# Patient Record
Sex: FEMALE | Race: BLACK OR AFRICAN AMERICAN | Employment: FULL TIME | URBAN - METROPOLITAN AREA
[De-identification: names, ages, dates, MRNs, and addresses within clinical notes are randomized per-mention and may not be internally consistent; named-entity substitution may affect disease eponyms.]

---

## 2020-12-10 PROBLEM — I10 HYPERTENSION: Status: ACTIVE | Noted: 2020-12-10

## 2020-12-10 PROBLEM — E87.6 HYPOKALEMIA: Status: ACTIVE | Noted: 2020-12-10

## 2021-06-08 ENCOUNTER — OFFICE VISIT (OUTPATIENT)
Dept: ENT CLINIC | Age: 61
End: 2021-06-08
Payer: OTHER GOVERNMENT

## 2021-06-08 VITALS
HEART RATE: 76 BPM | SYSTOLIC BLOOD PRESSURE: 132 MMHG | TEMPERATURE: 97.5 F | DIASTOLIC BLOOD PRESSURE: 70 MMHG | BODY MASS INDEX: 33.45 KG/M2 | WEIGHT: 200.8 LBS | HEIGHT: 65 IN | RESPIRATION RATE: 16 BRPM | OXYGEN SATURATION: 96 %

## 2021-06-08 DIAGNOSIS — H93.8X3 PLUGGED FEELING IN EAR, BILATERAL: Primary | ICD-10-CM

## 2021-06-08 DIAGNOSIS — H91.91 DECREASED HEARING, RIGHT: ICD-10-CM

## 2021-06-08 PROCEDURE — 99203 OFFICE O/P NEW LOW 30 MIN: CPT | Performed by: OTOLARYNGOLOGY

## 2021-06-08 RX ORDER — LISINOPRIL 10 MG/1
TABLET ORAL DAILY
COMMUNITY
End: 2021-08-13 | Stop reason: SDUPTHER

## 2021-06-08 NOTE — LETTER
6/8/2021 Patient: Ludmila Dillard YOB: 1960 Date of Visit: 6/8/2021 Javier Arias MD 
[facility]. [address] [place] [address],[id number] [address] [facility] [address] Dear Javier Arias MD, Thank you for referring Ms. Ludmila Dillard to [facility] EAR NOSE AND THROAT [address], THROAT AND ALLERGY CARE for evaluation. My notes for this consultation are attached. If you have questions, please do not hesitate to call me. I look forward to following your patient along with you. Sincerely, Talib Smith MD

## 2021-06-08 NOTE — PROGRESS NOTES
Otolaryngology-Head and Neck Surgery  New Patient Visit     Patient: Kennedy Jackson  YOB: 1960  MRN: [id number]  Date of Service: 6/8/2021    Chief Complaint:  Bilateral ear plugging     History of Present Illness: Kennedy Jackson is a 62y.o. year old female who presents today for discussion of ear plugging, bilateral. She notes about 1 month ago developing sensation of bilateral ear plugging. Her hearing seems to be impacted, feels like she can't hear out of her right ear. Tried using sweet oil without much help    No URI, trauma  No manipulation of ears    No otologic hx     Past Medical History:  Past Medical History:   Diagnosis Date    Hypertension 12/10/2020    Hypokalemia 12/10/2020       Past Surgical History:   Past Surgical History:   Procedure Laterality Date    HX HYSTERECTOMY      partial       Medications:   Current Outpatient Medications   Medication Instructions    amlodipine besylate (AMLODIPINE PO) Oral    lisinopriL (PRINIVIL, ZESTRIL) 10 mg tablet Oral, DAILY       Allergies:   No Known Allergies    Social History:   Social History     Tobacco Use    Smoking status: Current Every Day Smoker    Smokeless tobacco: Never Used   Vaping Use    Vaping Use: Never used   Substance Use Topics    Alcohol use: Not Currently    Drug use: Never   1 PPD        Family History:  Family History   Problem Relation Age of Onset    Hypertension Mother        Review of Systems:    Consitutional: denies fever, excessive weight gain or loss. Eyes: denies diplopia, eye pain. Integumentary: denies new concerning skin lesions. Ears, Nose, Mouth, Throat: denies except as per HPI.   Endocrine: denies hot or cold intolerance, increased thirst.  Respiratory: denies cough, hemoptysis, wheezing  Gastrointestinal: denies trouble swallowing, nausea, emesis, regurgitation  Musculoskeletal: denies muscle weakness or wasting  Cardiovascular: denies chest pain, shortness of breath  Neurologic: denies seizures, numbness or tingling, syncope  Hematologic: denies easy bleeding or bruising    Physical Examination:   There were no vitals filed for this visit. General: Comfortable, pleasant, appears stated age  Voice: Strong, speaking in full sentences, no stridor    Face: No masses or lesions, facial strength symmetric   Ears: External ears unremarkable. Bilateral ear canal clear. Tympanic membrane clear and intact, with visible landmarks. Clear middle ear space. Mild hyperemia of TM bilaterally   Nose: External nose unremarkable. Dorsum midline. Anterior rhinoscopy demonstrates no lesions. Septum midline. Turbinates without hypertrophy. Oral Cavity / Oropharynx: No trismus. Mucosa pink and moist. No lesions. Tongue is midline and mobile. Palate elevates symmetrically. Uvula midline. Tonsils unremarkable. Base of tongue soft. Floor of mouth soft. Neck: Supple. No adenopathy. Thyroid unremarkable. Palpable laryngeal landmarks. Full neck range of motion   Neurologic: CN II - XI intact. Normal gait. [place] to the left , AC > BC       Assessment and Plan:   1. Decreased hearing, right  2. Bilateral ear plugging  - Discussed normal ear exam today  - No effusion, infection, cerumen to account for symptoms  - Tuning fork and history raise question of sudden R SNHL  - She will come to our [facility] office tomorrow afternoon for audiogram to rule out sudden SNHL      Addendum - audiogram results 6/9 reviewed - borderline/mild HFSNHL bilaterally, symmetric    No sign of sudden SNHL    Will Rx flonase, allegra in case of ETD    Follow up 1 mo    The patient was instructed to return to clinic if no improvement or progression of symptoms. Signs to watch out for reviewed.       MD Luana Espinoza [facility]  [address] [place] [address]  Office Phone: [phone]

## 2021-06-08 NOTE — PROGRESS NOTES
Chief Complaint   Patient presents with    New Patient     Clogged Ears     Visit Vitals  /70 (BP 1 Location: Left upper arm, BP Patient Position: Sitting, BP Cuff Size: Adult)   Pulse 76   Temp 97.5 °F (36.4 °C)   Resp 16   Ht 5' 5\" (1.651 m)   Wt 200 lb 12.8 oz (91.1 kg)   SpO2 96%   BMI 33.41 kg/m²

## 2021-06-09 ENCOUNTER — OFFICE VISIT (OUTPATIENT)
Dept: ENT CLINIC | Age: 61
End: 2021-06-09
Payer: OTHER GOVERNMENT

## 2021-06-09 DIAGNOSIS — H90.42 SENSORINEURAL HEARING LOSS (SNHL) OF LEFT EAR WITH UNRESTRICTED HEARING OF RIGHT EAR: ICD-10-CM

## 2021-06-09 PROCEDURE — 92557 COMPREHENSIVE HEARING TEST: CPT | Performed by: AUDIOLOGIST

## 2021-06-09 RX ORDER — LORATADINE 10 MG/1
10 TABLET ORAL DAILY
Qty: 30 TABLET | Refills: 1 | Status: SHIPPED | OUTPATIENT
Start: 2021-06-09 | End: 2021-09-20 | Stop reason: ALTCHOICE

## 2021-06-09 RX ORDER — FLUTICASONE PROPIONATE 50 MCG
2 SPRAY, SUSPENSION (ML) NASAL DAILY
Qty: 1 BOTTLE | Refills: 1 | Status: SHIPPED | OUTPATIENT
Start: 2021-06-09 | End: 2021-09-20 | Stop reason: ALTCHOICE

## 2021-06-09 NOTE — PROGRESS NOTES
Humphrey Moreau, a 62y.o. year old female, was seen in ENT clinic today for a hearing evaluation on referral from Dr. Kamala Pinon. Patient complains of both ears being clogged for approximately 1 month. Patient was initially seen the day before on 6-8-2021 at the [facility]; exam suggested possible right sudden sensorineural hearing loss ([place] lateralized left per ENT report). Patient denies any tinnitus, ear pain, dizziness or unsteadiness. She does note that she sometimes has difficulty hearing and understanding but is unsure if this began prior to the clogged feeling. Otoscopy: normal external ear canals and visible tympanic membranes, bilaterally. Tympanometry: CNT due to broken equipment, awaiting repair. SRT: RE Speech Reception Threshold (SRT) was obtained at 20 dBHL LE Speech Reception Threshold (SRT) was obtained at 20 dBHL    WRS: RE Excellent in quiet when words were presented at 60 dBHL. WRS: LE Excellent in quiet when words were presented at 60 dBHL. Pure tone audiometry:  RE: WNL sloping to borderline normal hearing  LE: WNL sloping to mild high-frequency likely sensorineural hearing loss    Mild sensorineural hearing loss in the left ear only    Impressions:  hearing loss requiring medical/otologic and audiologic follow-up   Discussed results of today's testing with patient. Patient's hearing is normal to borderline normal in the right ear, with mild high-frequency hearing loss in the left ear. Discussed that high frequency loss can lead to muffled-sounding speech, which may coincide with the feeling of clogged ears. Recommended follow-up with ENT, with repeat hearing test in 1 year to monitor hearing loss. Did discuss that although patient is not a traditional candidate for amplification, it may be worth discussion if she feels that she is having significant trouble understanding speech. Plan:  Follow-up with ENT.   Repeat audiogram upon request.    Korey Valentine, AuD Doctor of Audiology

## 2021-08-13 NOTE — TELEPHONE ENCOUNTER
Requested Prescriptions     Pending Prescriptions Disp Refills    lisinopriL (PRINIVIL, ZESTRIL) 10 mg tablet       Sig: Take  by mouth daily.    patient is also requesting amlodipine and hydrochlorot 25 mg

## 2021-08-15 RX ORDER — LISINOPRIL 10 MG/1
10 TABLET ORAL DAILY
Qty: 90 TABLET | Refills: 1 | Status: SHIPPED | OUTPATIENT
Start: 2021-08-15 | End: 2021-09-20 | Stop reason: ALTCHOICE

## 2021-09-20 ENCOUNTER — OFFICE VISIT (OUTPATIENT)
Dept: PRIMARY CARE CLINIC | Age: 61
End: 2021-09-20
Payer: OTHER GOVERNMENT

## 2021-09-20 VITALS
BODY MASS INDEX: 33.53 KG/M2 | HEART RATE: 79 BPM | SYSTOLIC BLOOD PRESSURE: 146 MMHG | RESPIRATION RATE: 20 BRPM | OXYGEN SATURATION: 94 % | DIASTOLIC BLOOD PRESSURE: 111 MMHG | WEIGHT: 201.25 LBS | HEIGHT: 65 IN | TEMPERATURE: 98.8 F

## 2021-09-20 DIAGNOSIS — Z12.11 SCREENING FOR MALIGNANT NEOPLASM OF COLON: ICD-10-CM

## 2021-09-20 DIAGNOSIS — Z12.31 SCREENING MAMMOGRAM, ENCOUNTER FOR: ICD-10-CM

## 2021-09-20 DIAGNOSIS — I10 ESSENTIAL HYPERTENSION: Primary | ICD-10-CM

## 2021-09-20 DIAGNOSIS — E78.2 MIXED HYPERLIPIDEMIA: ICD-10-CM

## 2021-09-20 DIAGNOSIS — E66.09 CLASS 1 OBESITY DUE TO EXCESS CALORIES WITH SERIOUS COMORBIDITY AND BODY MASS INDEX (BMI) OF 33.0 TO 33.9 IN ADULT: ICD-10-CM

## 2021-09-20 DIAGNOSIS — F17.200 TOBACCO DEPENDENCE: ICD-10-CM

## 2021-09-20 PROCEDURE — 99214 OFFICE O/P EST MOD 30 MIN: CPT | Performed by: NURSE PRACTITIONER

## 2021-09-20 RX ORDER — LISINOPRIL AND HYDROCHLOROTHIAZIDE 10; 12.5 MG/1; MG/1
1 TABLET ORAL DAILY
Qty: 90 TABLET | Refills: 0 | Status: SHIPPED | OUTPATIENT
Start: 2021-09-20 | End: 2022-01-27 | Stop reason: ALTCHOICE

## 2021-09-20 RX ORDER — LISINOPRIL AND HYDROCHLOROTHIAZIDE 10; 12.5 MG/1; MG/1
1 TABLET ORAL DAILY
Qty: 30 TABLET | Refills: 0 | Status: SHIPPED | OUTPATIENT
Start: 2021-09-20 | End: 2021-09-20 | Stop reason: SDUPTHER

## 2021-09-20 RX ORDER — LISINOPRIL AND HYDROCHLOROTHIAZIDE 10; 12.5 MG/1; MG/1
1 TABLET ORAL DAILY
Qty: 90 TABLET | Refills: 0 | Status: SHIPPED | OUTPATIENT
Start: 2021-09-20 | End: 2021-09-20 | Stop reason: SDUPTHER

## 2021-09-20 NOTE — PROGRESS NOTES
Chief Complaint   Patient presents with    Hypertension     States has been out of medication for approximately one month.  Medication Refill     Patient states she is supposed to be on Amlodipine, Lisinopril and HCTZ. She gets #30 days at [facility] and #90 days from Mail order. 1. Have you been to the ER, urgent care clinic since your last visit? Hospitalized since your last visit? No    2. Have you seen or consulted any other health care providers outside of the [address] [place] [address] since your last visit? Include any pap smears or colon screening.  No

## 2021-09-21 ENCOUNTER — TELEPHONE (OUTPATIENT)
Dept: PRIMARY CARE CLINIC | Age: 61
End: 2021-09-21

## 2021-09-21 NOTE — PROGRESS NOTES
Junior Zaman is a 62 y.o. female who presents to the office today for the following:    Chief Complaint   Patient presents with    Hypertension     States has been out of medication for approximately one month.  Medication Refill     Patient states she is supposed to be on Amlodipine, Lisinopril and HCTZ. She gets #30 days at [facility] and #90 days from Mail order. Past Medical History:   Diagnosis Date    Hypertension 12/10/2020    Hypokalemia 12/10/2020       Past Surgical History:   Procedure Laterality Date    HX HYSTERECTOMY      partial        Family History   Problem Relation Age of Onset    Hypertension Mother         Social History     Tobacco Use    Smoking status: Current Every Day Smoker     Packs/day: 1.00    Smokeless tobacco: Never Used   Vaping Use    Vaping Use: Never used   Substance Use Topics    Alcohol use: Not Currently    Drug use: Never        HPI  Patient here today for follow up with PMH of hypertension, tobacco dependence and hypokalemia. States that she needs medication refilled for blood pressure. Also would like to do labs. No specific concerns. No current outpatient medications on file prior to visit. No current facility-administered medications on file prior to visit. Medications Ordered Today   Medications    DISCONTD: lisinopril-hydroCHLOROthiazide (PRINZIDE, ZESTORETIC) 10-12.5 mg per tablet     Sig: Take 1 Tablet by mouth daily. Dispense:  90 Tablet     Refill:  0    DISCONTD: lisinopril-hydroCHLOROthiazide (PRINZIDE, ZESTORETIC) 10-12.5 mg per tablet     Sig: Take 1 Tablet by mouth daily. Dispense:  30 Tablet     Refill:  0     Changed to 30 day and 90 day sent to mail order    lisinopril-hydroCHLOROthiazide (PRINZIDE, ZESTORETIC) 10-12.5 mg per tablet     Sig: Take 1 Tablet by mouth daily.      Dispense:  90 Tablet     Refill:  0     Changed to 30 day and 90 day sent to mail order        Review of Systems   Constitutional: Negative. HENT: Negative. Eyes: Negative. Respiratory: Negative. Cardiovascular: Negative. Gastrointestinal: Negative. Genitourinary: Negative. Musculoskeletal: Negative. Skin: Negative. Neurological: Negative. Psychiatric/Behavioral: Negative. Visit Vitals  BP (!) 146/111 (BP 1 Location: Right upper arm, BP Patient Position: Sitting, BP Cuff Size: Adult)   Pulse 79   Temp 98.8 °F (37.1 °C) (Oral)   Resp 20   Ht 5' 5\" (1.651 m)   Wt 201 lb 4 oz (91.3 kg)   SpO2 94%   BMI 33.49 kg/m²       Physical Exam  Vitals and nursing note reviewed. Constitutional:       Appearance: Normal appearance. She is obese. Eyes:      Pupils: Pupils are equal, round, and reactive to light. Neck:      Vascular: No carotid bruit. Cardiovascular:      Rate and Rhythm: Normal rate and regular rhythm. Pulses: Normal pulses. Heart sounds: Normal heart sounds. Pulmonary:      Effort: Pulmonary effort is normal.      Breath sounds: Normal breath sounds. Abdominal:      General: Bowel sounds are normal.      Palpations: Abdomen is soft. Tenderness: There is no abdominal tenderness. There is no guarding or rebound. Hernia: No hernia is present. Musculoskeletal:         General: Normal range of motion. Right lower leg: No edema. Left lower leg: No edema. Lymphadenopathy:      Cervical: No cervical adenopathy. Skin:     General: Skin is warm and dry. Neurological:      Mental Status: She is alert and oriented to person, place, and time. Mental status is at baseline.    Psychiatric:         Mood and Affect: Mood normal.         Behavior: Behavior normal.            1. Essential hypertension  Blood pressure is not controlled but needs refill on medication  Checking fasting labs today  Encourage to monitor at home and notify provider if > 140/90 consistently  - CBC WITH AUTOMATED DIFF  - METABOLIC PANEL, COMPREHENSIVE  - URINALYSIS W/ RFLX MICROSCOPIC  - LIPID PANEL  - TSH RFX ON ABNORMAL TO FREE T4  - lisinopril-hydroCHLOROthiazide (PRINZIDE, ZESTORETIC) 10-12.5 mg per tablet; Take 1 Tablet by mouth daily. Dispense: 90 Tablet; Refill: 0    2. Tobacco dependence  Continue to encourage smoking cessation    3. Screening mammogram, encounter for  Check mammogram  - [facility] MAMMO BI SCREENING INCL CAD; Future    4. Screening for malignant neoplasm of colon  Refer for screening colonscopy  - REFERRAL TO GASTROENTEROLOGY  - COLONOSCOPY; Future    5. Class 1 obesity due to excess calories with serious comorbidity and body mass index (BMI) of 33.0 to 33.9 in adult  Continue to encourage weight loss. Recommend decreasing excess fat, salt and sugar in diet along with getting regular exercise 3-5 times weekly for 30-45 minutes consistently. Patient verbalizes understanding of plan of care as discussed above    Follow-up and Dispositions    · Return in about 3 months (around 12/20/2021) for or sooner for worsening symptoms.

## 2021-09-23 LAB
ALBUMIN SERPL-MCNC: 4.3 G/DL (ref 3.8–4.9)
ALBUMIN/GLOB SERPL: 1.5 {RATIO} (ref 1.2–2.2)
ALP SERPL-CCNC: 149 IU/L (ref 44–121)
ALT SERPL-CCNC: 12 IU/L (ref 0–32)
APPEARANCE UR: CLEAR
AST SERPL-CCNC: 17 IU/L (ref 0–40)
BASOPHILS # BLD AUTO: 0.1 X10E3/UL (ref 0–0.2)
BASOPHILS NFR BLD AUTO: 1 %
BILIRUB SERPL-MCNC: 0.3 MG/DL (ref 0–1.2)
BILIRUB UR QL STRIP: NEGATIVE
BUN SERPL-MCNC: 12 MG/DL (ref 6–24)
BUN/CREAT SERPL: 12 (ref 9–23)
CALCIUM SERPL-MCNC: 9.8 MG/DL (ref 8.7–10.2)
CHLORIDE SERPL-SCNC: 106 MMOL/L (ref 96–106)
CHOLEST SERPL-MCNC: 210 MG/DL (ref 100–199)
CO2 SERPL-SCNC: 23 MMOL/L (ref 20–29)
COLOR UR: YELLOW
CREAT SERPL-MCNC: 0.98 MG/DL (ref 0.57–1)
EOSINOPHIL # BLD AUTO: 0.2 X10E3/UL (ref 0–0.4)
EOSINOPHIL NFR BLD AUTO: 4 %
ERYTHROCYTE [DISTWIDTH] IN BLOOD BY AUTOMATED COUNT: 13.8 % (ref 11.7–15.4)
GLOBULIN SER CALC-MCNC: 2.8 G/DL (ref 1.5–4.5)
GLUCOSE SERPL-MCNC: 94 MG/DL (ref 65–99)
GLUCOSE UR QL: NEGATIVE
HCT VFR BLD AUTO: 43.3 % (ref 34–46.6)
HDLC SERPL-MCNC: 64 MG/DL
HGB BLD-MCNC: 14.2 G/DL (ref 11.1–15.9)
HGB UR QL STRIP: NEGATIVE
IMM GRANULOCYTES # BLD AUTO: 0 X10E3/UL (ref 0–0.1)
IMM GRANULOCYTES NFR BLD AUTO: 0 %
KETONES UR QL STRIP: NEGATIVE
LDLC SERPL CALC-MCNC: 134 MG/DL (ref 0–99)
LEUKOCYTE ESTERASE UR QL STRIP: NEGATIVE
LYMPHOCYTES # BLD AUTO: 2.3 X10E3/UL (ref 0.7–3.1)
LYMPHOCYTES NFR BLD AUTO: 49 %
MCH RBC QN AUTO: 28.1 PG (ref 26.6–33)
MCHC RBC AUTO-ENTMCNC: 32.8 G/DL (ref 31.5–35.7)
MCV RBC AUTO: 86 FL (ref 79–97)
MICRO URNS: NORMAL
MONOCYTES # BLD AUTO: 0.4 X10E3/UL (ref 0.1–0.9)
MONOCYTES NFR BLD AUTO: 9 %
NEUTROPHILS # BLD AUTO: 1.8 X10E3/UL (ref 1.4–7)
NEUTROPHILS NFR BLD AUTO: 37 %
NITRITE UR QL STRIP: NEGATIVE
PH UR STRIP: 6.5 [PH] (ref 5–7.5)
PLATELET # BLD AUTO: 180 X10E3/UL (ref 150–450)
POTASSIUM SERPL-SCNC: 4.1 MMOL/L (ref 3.5–5.2)
PROT SERPL-MCNC: 7.1 G/DL (ref 6–8.5)
PROT UR QL STRIP: NEGATIVE
RBC # BLD AUTO: 5.06 X10E6/UL (ref 3.77–5.28)
SODIUM SERPL-SCNC: 142 MMOL/L (ref 134–144)
SP GR UR: 1.02 (ref 1–1.03)
TRIGL SERPL-MCNC: 68 MG/DL (ref 0–149)
TSH SERPL DL<=0.005 MIU/L-ACNC: 1.13 UIU/ML (ref 0.45–4.5)
UROBILINOGEN UR STRIP-MCNC: 0.2 MG/DL (ref 0.2–1)
VLDLC SERPL CALC-MCNC: 12 MG/DL (ref 5–40)
WBC # BLD AUTO: 4.8 X10E3/UL (ref 3.4–10.8)

## 2021-10-04 RX ORDER — ATORVASTATIN CALCIUM 20 MG/1
20 TABLET, FILM COATED ORAL DAILY
Qty: 90 TABLET | Refills: 0 | Status: SHIPPED | OUTPATIENT
Start: 2021-10-04 | End: 2022-01-27 | Stop reason: SDUPTHER

## 2021-10-04 NOTE — PROGRESS NOTES
Please let patient know that bad cholesterol is high at 134. The 10-year ASCVD risk score (Robin Quintero, et al., 2013) is: 17% I am recommending that she start on statin medication to help lower cardiac risk. I am going to send in and need to repeat the liver enzymes x 1 in 6 weeks. Most common side effect can be muscle aches (new pains so be aware of her baseline myalgias) and would want to know if these develop. Otherwise usually well tolerated and should take at night. Her alk phos is also slightly elevated but will just re-check when she has liver enzymes done at 6 weeks. Her rest of labs are essentially normal. If any questions, please let me know.

## 2021-10-07 ENCOUNTER — TELEPHONE (OUTPATIENT)
Dept: PRIMARY CARE CLINIC | Age: 61
End: 2021-10-07

## 2021-10-12 ENCOUNTER — TELEPHONE (OUTPATIENT)
Dept: PRIMARY CARE CLINIC | Age: 61
End: 2021-10-12

## 2021-10-12 NOTE — TELEPHONE ENCOUNTER
Patient called and said [facility] did not have her prescription that Susana Pradhan sent. It looks like it was sent on 10/04/21 but pharmacy is saying they do not have it. Patient would like a call back.

## 2021-10-19 ENCOUNTER — HOSPITAL ENCOUNTER (OUTPATIENT)
Dept: MAMMOGRAPHY | Age: 61
Discharge: HOME | End: 2021-10-19
Attending: NURSE PRACTITIONER
Payer: OTHER GOVERNMENT

## 2021-10-19 DIAGNOSIS — Z12.31 SCREENING MAMMOGRAM, ENCOUNTER FOR: ICD-10-CM

## 2021-10-19 PROCEDURE — 77063 BREAST TOMOSYNTHESIS BI: CPT

## 2021-11-17 ENCOUNTER — OFFICE VISIT (OUTPATIENT)
Dept: GASTROENTEROLOGY | Age: 61
End: 2021-11-17
Payer: OTHER GOVERNMENT

## 2021-11-17 VITALS
TEMPERATURE: 98.8 F | DIASTOLIC BLOOD PRESSURE: 90 MMHG | HEART RATE: 84 BPM | HEIGHT: 65 IN | WEIGHT: 202.6 LBS | RESPIRATION RATE: 18 BRPM | BODY MASS INDEX: 33.76 KG/M2 | OXYGEN SATURATION: 93 % | SYSTOLIC BLOOD PRESSURE: 160 MMHG

## 2021-11-17 DIAGNOSIS — Z86.010 PERSONAL HISTORY OF COLONIC POLYPS: Primary | ICD-10-CM

## 2021-11-17 PROCEDURE — 99204 OFFICE O/P NEW MOD 45 MIN: CPT | Performed by: INTERNAL MEDICINE

## 2021-11-17 RX ORDER — POLYETHYLENE GLYCOL 3350 17 G/17G
POWDER, FOR SOLUTION ORAL
Qty: 510 G | Refills: 0 | Status: SHIPPED | OUTPATIENT
Start: 2021-11-17 | End: 2021-12-03

## 2021-11-17 RX ORDER — CLONIDINE HYDROCHLORIDE 0.1 MG/1
TABLET ORAL
COMMUNITY
End: 2022-01-28 | Stop reason: ALTCHOICE

## 2021-11-17 RX ORDER — AMLODIPINE BESYLATE 5 MG/1
TABLET ORAL
COMMUNITY
End: 2022-01-27 | Stop reason: SDUPTHER

## 2021-11-17 NOTE — PROGRESS NOTES
Sue Carrington is a 62 y.o. female who presents today for the following:  Chief Complaint   Patient presents with    Colon Polyps         No Known Allergies    Current Outpatient Medications   Medication Sig    amLODIPine (NORVASC) 5 mg tablet amlodipine 5 mg tablet   Take 1 tablet every day by oral route for 90 days.  polyethylene glycol (MIRALAX) 17 gram/dose powder Use as directed  Indications: emptying of the bowel    atorvastatin (LIPITOR) 20 mg tablet Take 1 Tablet by mouth daily.  lisinopril-hydroCHLOROthiazide (PRINZIDE, ZESTORETIC) 10-12.5 mg per tablet Take 1 Tablet by mouth daily.  cloNIDine HCL (CATAPRES) 0.1 mg tablet clonidine HCl 0.1 mg tablet   Take 1 tablet twice a day by oral route. (Patient not taking: Reported on 11/17/2021)     No current facility-administered medications for this visit.        Past Medical History:   Diagnosis Date    Hypertension 12/10/2020    Hypokalemia 12/10/2020    Menopause     Personal history of colonic polyps 11/17/2021       Past Surgical History:   Procedure Laterality Date    HX HYSTERECTOMY      partial       Family History   Problem Relation Age of Onset    Hypertension Mother     Breast Cancer Maternal Grandmother     Breast Cancer Paternal Grandmother        Social History     Socioeconomic History    Marital status:      Spouse name: Not on file    Number of children: Not on file    Years of education: Not on file    Highest education level: Not on file   Occupational History    Not on file   Tobacco Use    Smoking status: Current Every Day Smoker     Packs/day: 1.00    Smokeless tobacco: Never Used   Vaping Use    Vaping Use: Never used   Substance and Sexual Activity    Alcohol use: Not Currently    Drug use: Never    Sexual activity: Not on file   Other Topics Concern    Not on file   Social History Narrative    Not on file     Social Determinants of Health     Financial Resource Strain:     Difficulty of Paying Living Expenses: Not on file   Food Insecurity:     Worried About [address] in the Last Year: Not on file    [place] of Food in the Last Year: Not on file   Transportation Needs:     Lack of Transportation (Medical): Not on file    Lack of Transportation (Non-Medical): Not on file   Physical Activity:     Days of Exercise per Week: Not on file    Minutes of Exercise per Session: Not on file   Stress:     Feeling of Stress : Not on file   Social Connections:     Frequency of Communication with Friends and Family: Not on file    Frequency of Social Gatherings with Friends and Family: Not on file    Attends [employer] Services: Not on file    Active Member of [address] [place] [address] or Organizations: Not on file    Attends Club or Organization Meetings: Not on file    Marital Status: Not on file   Intimate Partner Violence:     Fear of Current or Ex-Partner: Not on file    Emotionally Abused: Not on file    Physically Abused: Not on file    Sexually Abused: Not on file   Housing Stability:     Unable to Pay for Housing in the Last Year: Not on file    Number of [place] in the Last Year: Not on file    Unstable Housing in the Last Year: Not on file         HPI  50-year-old female with history of hypertension, hyperlipidemia, sciatica, and colon polyps who comes in for follow-up evaluation. Patient last had a colonoscopy on 6/6/2016 which showed hyperplastic rectal polyps. Patient states she has been doing well. No issues with her abdomen. She is eating well has good appetite. Her bowel movements are doing good and usually regular and formed. No gross GI bleeding. No black or tarry stools. Her weight has remained stable. No surgeries since last being seen. Review of Systems   Constitutional: Negative. HENT: Negative. Negative for nosebleeds. Eyes: Negative. Respiratory: Negative. Cardiovascular: Negative. Gastrointestinal: Negative.   Negative for abdominal pain, blood in stool, constipation, diarrhea, heartburn, melena, nausea and vomiting. Genitourinary: Negative. Musculoskeletal: Negative. Skin: Negative. Neurological: Negative. Endo/Heme/Allergies: Negative. Psychiatric/Behavioral: Negative. All other systems reviewed and are negative. Visit Vitals  BP (!) 160/90 (BP 1 Location: Right arm, BP Patient Position: Sitting, BP Cuff Size: Adult)   Pulse 84   Temp 98.8 °F (37.1 °C) (Temporal)   Resp 18   Ht 5' 5\" (1.651 m)   Wt 91.9 kg (202 lb 9.6 oz)   SpO2 93%   BMI 33.71 kg/m²     Physical Exam  Vitals and nursing note reviewed. Constitutional:       Appearance: Normal appearance. She is obese. HENT:      Head: Normocephalic and atraumatic. Nose: Nose normal.      Mouth/Throat:      Mouth: Mucous membranes are moist.      Pharynx: Oropharynx is clear. Eyes:      General: No scleral icterus. Conjunctiva/sclera: Conjunctivae normal.      Pupils: Pupils are equal, round, and reactive to light. Cardiovascular:      Rate and Rhythm: Normal rate and regular rhythm. Pulses: Normal pulses. Heart sounds: Normal heart sounds. Pulmonary:      Effort: Pulmonary effort is normal.      Breath sounds: Normal breath sounds. Abdominal:      General: Bowel sounds are normal. There is no distension. Palpations: Abdomen is soft. There is no mass. Tenderness: There is no abdominal tenderness. There is no right CVA tenderness, left CVA tenderness, guarding or rebound. Hernia: No hernia is present. Musculoskeletal:         General: Normal range of motion. Cervical back: Normal range of motion and neck supple. Skin:     General: Skin is warm and dry. Coloration: Skin is not jaundiced. Neurological:      General: No focal deficit present. Mental Status: She is alert and oriented to person, place, and time.    Psychiatric:         Mood and Affect: Mood normal.         Behavior: Behavior normal.         Thought Content: Thought content normal.         Judgment: Judgment normal.            1. Personal history of colonic polyps  Schedule patient for a surveillance colonoscopy.   - COLONOSCOPY,DIAGNOSTIC; Future  - polyethylene glycol (MIRALAX) 17 gram/dose powder; Use as directed  Indications: emptying of the bowel  Dispense: 510 g; Refill: 0

## 2021-11-17 NOTE — H&P (VIEW-ONLY)
Carina Riggins is a 62 y.o. female who presents today for the following:  Chief Complaint   Patient presents with    Colon Polyps         No Known Allergies    Current Outpatient Medications   Medication Sig    amLODIPine (NORVASC) 5 mg tablet amlodipine 5 mg tablet   Take 1 tablet every day by oral route for 90 days.  polyethylene glycol (MIRALAX) 17 gram/dose powder Use as directed  Indications: emptying of the bowel    atorvastatin (LIPITOR) 20 mg tablet Take 1 Tablet by mouth daily.  lisinopril-hydroCHLOROthiazide (PRINZIDE, ZESTORETIC) 10-12.5 mg per tablet Take 1 Tablet by mouth daily.  cloNIDine HCL (CATAPRES) 0.1 mg tablet clonidine HCl 0.1 mg tablet   Take 1 tablet twice a day by oral route. (Patient not taking: Reported on 11/17/2021)     No current facility-administered medications for this visit.        Past Medical History:   Diagnosis Date    Hypertension 12/10/2020    Hypokalemia 12/10/2020    Menopause     Personal history of colonic polyps 11/17/2021       Past Surgical History:   Procedure Laterality Date    HX HYSTERECTOMY      partial       Family History   Problem Relation Age of Onset    Hypertension Mother     Breast Cancer Maternal Grandmother     Breast Cancer Paternal Grandmother        Social History     Socioeconomic History    Marital status:      Spouse name: Not on file    Number of children: Not on file    Years of education: Not on file    Highest education level: Not on file   Occupational History    Not on file   Tobacco Use    Smoking status: Current Every Day Smoker     Packs/day: 1.00    Smokeless tobacco: Never Used   Vaping Use    Vaping Use: Never used   Substance and Sexual Activity    Alcohol use: Not Currently    Drug use: Never    Sexual activity: Not on file   Other Topics Concern    Not on file   Social History Narrative    Not on file     Social Determinants of Health     Financial Resource Strain:     Difficulty of Paying Living Expenses: Not on file   Food Insecurity:     Worried About [employer] [employer] in the Last Year: Not on file    [place] of Food in the Last Year: Not on file   Transportation Needs:     Lack of Transportation (Medical): Not on file    Lack of Transportation (Non-Medical): Not on file   Physical Activity:     Days of Exercise per Week: Not on file    Minutes of Exercise per Session: Not on file   Stress:     Feeling of Stress : Not on file   Social Connections:     Frequency of Communication with Friends and Family: Not on file    Frequency of Social Gatherings with Friends and Family: Not on file    Attends Sikh Services: Not on file    Active Member of [employer] or Organizations: Not on file    Attends Club or Organization Meetings: Not on file    Marital Status: Not on file   Intimate Partner Violence:     Fear of Current or Ex-Partner: Not on file    Emotionally Abused: Not on file    Physically Abused: Not on file    Sexually Abused: Not on file   Housing Stability:     Unable to Pay for Housing in the Last Year: Not on file    Number of [place] in the Last Year: Not on file    Unstable Housing in the Last Year: Not on file         HPI  69-year-old female with history of hypertension, hyperlipidemia, sciatica, and colon polyps who comes in for follow-up evaluation. Patient last had a colonoscopy on 6/6/2016 which showed hyperplastic rectal polyps. Patient states she has been doing well. No issues with her abdomen. She is eating well has good appetite. Her bowel movements are doing good and usually regular and formed. No gross GI bleeding. No black or tarry stools. Her weight has remained stable. No surgeries since last being seen. Review of Systems   Constitutional: Negative. HENT: Negative. Negative for nosebleeds. Eyes: Negative. Respiratory: Negative. Cardiovascular: Negative. Gastrointestinal: Negative.   Negative for abdominal pain, blood in stool, constipation, diarrhea, heartburn, melena, nausea and vomiting. Genitourinary: Negative. Musculoskeletal: Negative. Skin: Negative. Neurological: Negative. Endo/Heme/Allergies: Negative. Psychiatric/Behavioral: Negative. All other systems reviewed and are negative. Visit Vitals  BP (!) 160/90 (BP 1 Location: Right arm, BP Patient Position: Sitting, BP Cuff Size: Adult)   Pulse 84   Temp 98.8 °F (37.1 °C) (Temporal)   Resp 18   Ht 5' 5\" (1.651 m)   Wt 91.9 kg (202 lb 9.6 oz)   SpO2 93%   BMI 33.71 kg/m²     Physical Exam  Vitals and nursing note reviewed. Constitutional:       Appearance: Normal appearance. She is obese. HENT:      Head: Normocephalic and atraumatic. Nose: Nose normal.      Mouth/Throat:      Mouth: Mucous membranes are moist.      Pharynx: Oropharynx is clear. Eyes:      General: No scleral icterus. Conjunctiva/sclera: Conjunctivae normal.      Pupils: Pupils are equal, round, and reactive to light. Cardiovascular:      Rate and Rhythm: Normal rate and regular rhythm. Pulses: Normal pulses. Heart sounds: Normal heart sounds. Pulmonary:      Effort: Pulmonary effort is normal.      Breath sounds: Normal breath sounds. Abdominal:      General: Bowel sounds are normal. There is no distension. Palpations: Abdomen is soft. There is no mass. Tenderness: There is no abdominal tenderness. There is no right CVA tenderness, left CVA tenderness, guarding or rebound. Hernia: No hernia is present. Musculoskeletal:         General: Normal range of motion. Cervical back: Normal range of motion and neck supple. Skin:     General: Skin is warm and dry. Coloration: Skin is not jaundiced. Neurological:      General: No focal deficit present. Mental Status: She is alert and oriented to person, place, and time.    Psychiatric:         Mood and Affect: Mood normal.         Behavior: Behavior normal.         Thought Content: Thought content normal.         Judgment: Judgment normal.            1. Personal history of colonic polyps  Schedule patient for a surveillance colonoscopy.   - COLONOSCOPY,DIAGNOSTIC; Future  - polyethylene glycol (MIRALAX) 17 gram/dose powder; Use as directed  Indications: emptying of the bowel  Dispense: 510 g; Refill: 0

## 2021-11-17 NOTE — PROGRESS NOTES
COLONOSCOPY IS SCHEDULED FOR 12-3-2021 AT 12:00 PM  COVID TEST TO BE DONE Monday NOV 29, 2021 AT HER JOB. SHE WILL FAX RESULTS.   NO PA REQUIRED PER [facility]  11- AT 12:47 PM

## 2021-12-03 ENCOUNTER — ANESTHESIA (OUTPATIENT)
Dept: ENDOSCOPY | Age: 61
End: 2021-12-03
Payer: OTHER GOVERNMENT

## 2021-12-03 ENCOUNTER — HOSPITAL ENCOUNTER (OUTPATIENT)
Age: 61
Setting detail: OUTPATIENT SURGERY
Discharge: HOME | End: 2021-12-03
Attending: INTERNAL MEDICINE | Admitting: INTERNAL MEDICINE
Payer: OTHER GOVERNMENT

## 2021-12-03 ENCOUNTER — ANESTHESIA EVENT (OUTPATIENT)
Dept: ENDOSCOPY | Age: 61
End: 2021-12-03
Payer: OTHER GOVERNMENT

## 2021-12-03 VITALS
SYSTOLIC BLOOD PRESSURE: 173 MMHG | OXYGEN SATURATION: 100 % | DIASTOLIC BLOOD PRESSURE: 86 MMHG | TEMPERATURE: 98.3 F | BODY MASS INDEX: 33.97 KG/M2 | HEART RATE: 56 BPM | WEIGHT: 199 LBS | RESPIRATION RATE: 18 BRPM | HEIGHT: 64 IN

## 2021-12-03 PROCEDURE — 77030019988 HC FCPS ENDOSC DISP BSC -B: Performed by: INTERNAL MEDICINE

## 2021-12-03 PROCEDURE — 76040000019: Performed by: INTERNAL MEDICINE

## 2021-12-03 PROCEDURE — 74011250636 HC RX REV CODE- 250/636

## 2021-12-03 PROCEDURE — 74011000250 HC RX REV CODE- 250: Performed by: NURSE ANESTHETIST, CERTIFIED REGISTERED

## 2021-12-03 PROCEDURE — 45380 COLONOSCOPY AND BIOPSY: CPT | Performed by: INTERNAL MEDICINE

## 2021-12-03 PROCEDURE — 2709999900 HC NON-CHARGEABLE SUPPLY: Performed by: INTERNAL MEDICINE

## 2021-12-03 PROCEDURE — 74011250636 HC RX REV CODE- 250/636: Performed by: NURSE ANESTHETIST, CERTIFIED REGISTERED

## 2021-12-03 PROCEDURE — 74011250636 HC RX REV CODE- 250/636: Performed by: INTERNAL MEDICINE

## 2021-12-03 PROCEDURE — 88305 TISSUE EXAM BY PATHOLOGIST: CPT

## 2021-12-03 PROCEDURE — 76060000031 HC ANESTHESIA FIRST 0.5 HR: Performed by: INTERNAL MEDICINE

## 2021-12-03 RX ORDER — SODIUM CHLORIDE 9 MG/ML
INJECTION, SOLUTION INTRAVENOUS
Status: DISCONTINUED | OUTPATIENT
Start: 2021-12-03 | End: 2021-12-03 | Stop reason: HOSPADM

## 2021-12-03 RX ORDER — SODIUM CHLORIDE 0.9 % (FLUSH) 0.9 %
5-40 SYRINGE (ML) INJECTION EVERY 8 HOURS
Status: DISCONTINUED | OUTPATIENT
Start: 2021-12-03 | End: 2021-12-03 | Stop reason: HOSPADM

## 2021-12-03 RX ORDER — SODIUM CHLORIDE 9 MG/ML
125 INJECTION, SOLUTION INTRAVENOUS CONTINUOUS
Status: DISCONTINUED | OUTPATIENT
Start: 2021-12-03 | End: 2021-12-03 | Stop reason: HOSPADM

## 2021-12-03 RX ORDER — SODIUM CHLORIDE 0.9 % (FLUSH) 0.9 %
5-40 SYRINGE (ML) INJECTION AS NEEDED
Status: DISCONTINUED | OUTPATIENT
Start: 2021-12-03 | End: 2021-12-03 | Stop reason: HOSPADM

## 2021-12-03 RX ORDER — LABETALOL HYDROCHLORIDE 5 MG/ML
INJECTION, SOLUTION INTRAVENOUS AS NEEDED
Status: DISCONTINUED | OUTPATIENT
Start: 2021-12-03 | End: 2021-12-03 | Stop reason: HOSPADM

## 2021-12-03 RX ORDER — PROPOFOL 10 MG/ML
INJECTION, EMULSION INTRAVENOUS AS NEEDED
Status: DISCONTINUED | OUTPATIENT
Start: 2021-12-03 | End: 2021-12-03 | Stop reason: HOSPADM

## 2021-12-03 RX ADMIN — LABETALOL HYDROCHLORIDE 10 MG: 5 INJECTION INTRAVENOUS at 13:34

## 2021-12-03 RX ADMIN — SODIUM CHLORIDE: 9 INJECTION, SOLUTION INTRAVENOUS at 13:27

## 2021-12-03 RX ADMIN — PROPOFOL 100 MG: 10 INJECTION, EMULSION INTRAVENOUS at 13:28

## 2021-12-03 RX ADMIN — PROPOFOL 100 MG: 10 INJECTION, EMULSION INTRAVENOUS at 13:36

## 2021-12-03 RX ADMIN — SODIUM CHLORIDE 125 ML/HR: 9 INJECTION, SOLUTION INTRAVENOUS at 11:41

## 2021-12-03 NOTE — INTERVAL H&P NOTE
Update History & Physical    The Patient's History and Physical of December 3, 2021,  was reviewed with the patient and I examined the patient. There was no change. The surgical site was confirmed by the patient and me. Plan:  The risk, benefits, expected outcome, and alternative to the recommended procedure have been discussed with the patient. Patient understands and wants to proceed with the procedure.     Electronically signed by Oleg Alvarado MD on 12/3/2021 at 11:01 AM

## 2021-12-03 NOTE — OP NOTES
Colonoscopy Procedure Note      Patient: Ketan Nuno MRN: [id number]  SSN: [id number]    YOB: 1960  Age: 62 y.o. Sex: female      Date of Procedure: 12/3/2021  Date/Time:  12/3/2021 1:50 PM       IMPRESSION:     1. Sigmoid colon polyp   2. Rectal polyp              3.  Left-sided diverticulosis         RECOMMENDATIONS:     1) Check biopsy results. 2) Await pathology report. Call me in 2 weeks if you have not received any information from my office regarding your results. 3) Repeat colonoscopy in 2 to 3 years or as determined by the pathology report. INDICATION: History of colon polyps     PROCEDURE PERFORMED: Colonoscopy with cold biopsies     DESCRIPTION OF PROCEDURE: An informed consent was obtained. The patient was placed in left lateral position. Perianal inspection and a digital rectal exam was performed. Video colonoscope was introduced into the rectum and advanced under direct vision up to the terminal ileum. With adequate insufflation and maneuvering of the withdrawing scope, the colonic mucosa was visualized carefully. Retroflexion was performed in the rectum to see the anorectum and also in the ascending colon to look behind the folds. Vital signs, pulse oximetry, single lead cardiac monitor were monitored throughout the procedure as the sedation was titrated to the desired effect ensuring patient comfort and safety. The patient tolerated the procedure very well and was transferred to the recovery area. Following is the summary of findings: In the sigmoid colon and inflamed polyp was noted which measured 0.6 cm which was removed via multiple cold biopsies. A polyp measuring approximately 0.4 cm was removed from the rectum via cold biopsies. Patient had diverticulosis involving the sigmoid and descending colon.      ENDOSCOPIST: Lula Boogie MD      ENDOSCOPE: Olympus video colonoscope     ASSISTANT:Circ-1: Abel Mcnair              Scrub Tech-1: Daisy Vo      ANESTHESIA: TIVA      QUALITY OF PREPARATION: Good      FINDINGS:   1. Sigmoid colon polyp  2. Rectal polyp  3.  Left-sided diverticulosis       Complications: None     EBL: Minimal     SPECIMENS:   ID Type Source Tests Collected by Time Destination   1 : polyp Preservative Sigmoid  Omero Valentine MD 12/3/2021 1332 Pathology   2 : polyp Preservative Rectal  Omero Valentine MD 12/3/2021 1343 Pathology             Lisa Springer MD  December 3, 2021  1:50 PM

## 2021-12-03 NOTE — ANESTHESIA PREPROCEDURE EVALUATION
Relevant Problems   No relevant active problems       Anesthetic History   No history of anesthetic complications  Other anesthesia complications          Review of Systems / Medical History  Patient summary reviewed, nursing notes reviewed and pertinent labs reviewed    Pulmonary  Within defined limits                 Neuro/Psych   Within defined limits           Cardiovascular  Within defined limits  Hypertension                   GI/Hepatic/Renal  Within defined limits              Endo/Other  Within defined limits      Obesity     Other Findings              Physical Exam    Airway  Mallampati: II  TM Distance: 4 - 6 cm  Neck ROM: normal range of motion        Cardiovascular    Rhythm: regular  Rate: normal         Dental  No notable dental hx       Pulmonary  Breath sounds clear to auscultation               Abdominal  Abdominal exam normal       Other Findings            Anesthetic Plan    ASA: 2  Anesthesia type: total IV anesthesia            Anesthetic plan and risks discussed with: Patient

## 2021-12-03 NOTE — DISCHARGE INSTRUCTIONS

## 2021-12-14 NOTE — PROGRESS NOTES
Tell patient that the polyps removed from her colon were all benign. She should have a repeat colonoscopy in 2 years.

## 2022-01-27 ENCOUNTER — OFFICE VISIT (OUTPATIENT)
Dept: PRIMARY CARE CLINIC | Age: 62
End: 2022-01-27
Payer: OTHER GOVERNMENT

## 2022-01-27 VITALS
HEIGHT: 64 IN | RESPIRATION RATE: 20 BRPM | DIASTOLIC BLOOD PRESSURE: 98 MMHG | HEART RATE: 75 BPM | BODY MASS INDEX: 33.94 KG/M2 | OXYGEN SATURATION: 97 % | TEMPERATURE: 97.4 F | WEIGHT: 198.8 LBS | SYSTOLIC BLOOD PRESSURE: 168 MMHG

## 2022-01-27 DIAGNOSIS — E66.09 CLASS 1 OBESITY DUE TO EXCESS CALORIES WITH SERIOUS COMORBIDITY AND BODY MASS INDEX (BMI) OF 33.0 TO 33.9 IN ADULT: ICD-10-CM

## 2022-01-27 DIAGNOSIS — I10 ESSENTIAL HYPERTENSION: Primary | ICD-10-CM

## 2022-01-27 DIAGNOSIS — F17.200 TOBACCO DEPENDENCE: ICD-10-CM

## 2022-01-27 DIAGNOSIS — E78.2 MIXED HYPERLIPIDEMIA: ICD-10-CM

## 2022-01-27 PROCEDURE — 99214 OFFICE O/P EST MOD 30 MIN: CPT | Performed by: NURSE PRACTITIONER

## 2022-01-27 RX ORDER — LISINOPRIL AND HYDROCHLOROTHIAZIDE 20; 25 MG/1; MG/1
1 TABLET ORAL DAILY
Qty: 90 TABLET | Refills: 0 | Status: SHIPPED | OUTPATIENT
Start: 2022-01-27

## 2022-01-27 RX ORDER — ATORVASTATIN CALCIUM 20 MG/1
20 TABLET, FILM COATED ORAL DAILY
Qty: 90 TABLET | Refills: 0 | Status: SHIPPED | OUTPATIENT
Start: 2022-01-27

## 2022-01-27 RX ORDER — AMLODIPINE BESYLATE 5 MG/1
TABLET ORAL
Qty: 90 TABLET | Refills: 0 | Status: SHIPPED | OUTPATIENT
Start: 2022-01-27

## 2022-01-27 NOTE — PROGRESS NOTES
Chief Complaint   Patient presents with    Hypertension     Pt did not take her meds today and did not bring her meds in but stated nothing has changed     1. Have you been to the ER, urgent care clinic since your last visit? Hospitalized since your last visit? No    2. Have you seen or consulted any other health care providers outside of the [address] [place] [address] since your last visit? Include any pap smears or colon screening.  No

## 2022-01-28 NOTE — PROGRESS NOTES
Afua Loza is a 62 y.o. female who presents to the office today for the following:    Chief Complaint   Patient presents with    Hypertension       Past Medical History:   Diagnosis Date    Hypertension 12/10/2020    Hypokalemia 12/10/2020    Menopause     Personal history of colonic polyps 11/17/2021       Past Surgical History:   Procedure Laterality Date    COLONOSCOPY N/A 12/3/2021    COLONOSCOPY (TIVA) performed by Slime [facility] at [facility] END[facility]COPY    HX HYSTERECTOMY      partial        Family History   Problem Relation Age of Onset    Hypertension Mother     Breast Cancer Maternal Grandmother     Breast Cancer Paternal Grandmother     Cancer Father         Social History     Tobacco Use    Smoking status: Current Every Day Smoker     Packs/day: 1.00    Smokeless tobacco: Never Used   Vaping Use    Vaping Use: Never used   Substance Use Topics    Alcohol use: Not Currently    Drug use: Never        HPI  Patient here today for follow up with PMH of hypertension, hyperlipidemia, tobacco dependence and obesity. States that she is taking medicines as directed but does need refills today. Has had some increased stress at home but feels she is handling this ok. Current Outpatient Medications on File Prior to Visit   Medication Sig    [DISCONTINUED] cloNIDine HCL (CATAPRES) 0.1 mg tablet clonidine HCl 0.1 mg tablet   Take 1 tablet twice a day by oral route. (Patient not taking: Reported on 11/17/2021)     No current facility-administered medications on file prior to visit. Medications Ordered Today   Medications    lisinopril-hydroCHLOROthiazide (PRINZIDE, ZESTORETIC) 20-25 mg per tablet     Sig: Take 1 Tablet by mouth daily. Dispense:  90 Tablet     Refill:  0    atorvastatin (LIPITOR) 20 mg tablet     Sig: Take 1 Tablet by mouth daily.      Dispense:  90 Tablet     Refill:  0    amLODIPine (NORVASC) 5 mg tablet     Sig: Take 1 tablet every day by oral route for 90 days.     Dispense:  90 Tablet     Refill:  0        Review of Systems   Constitutional: Negative. HENT: Negative. Eyes: Negative. Respiratory: Negative. Cardiovascular: Negative. Gastrointestinal: Negative. Genitourinary: Negative. Musculoskeletal: Negative. Skin: Negative. Neurological: Negative. Psychiatric/Behavioral: Negative. Visit Vitals  BP (!) 168/98 (BP 1 Location: Left upper arm, BP Patient Position: Sitting, BP Cuff Size: Large adult)   Pulse 75   Temp 97.4 °F (36.3 °C) (Temporal)   Resp 20   Ht 5' 4\" (1.626 m)   Wt 198 lb 12.8 oz (90.2 kg)   SpO2 97%   BMI 34.12 kg/m²       Physical Exam  Vitals and nursing note reviewed. Constitutional:       Appearance: Normal appearance. Cardiovascular:      Rate and Rhythm: Normal rate and regular rhythm. Pulses: Normal pulses. Heart sounds: Normal heart sounds. Pulmonary:      Effort: Pulmonary effort is normal.      Breath sounds: Normal breath sounds. Abdominal:      General: Bowel sounds are normal.      Palpations: Abdomen is soft. Tenderness: There is no abdominal tenderness. There is no guarding. Musculoskeletal:         General: Normal range of motion. Right lower leg: No edema. Left lower leg: No edema. Skin:     General: Skin is warm and dry. Neurological:      Mental Status: She is alert and oriented to person, place, and time. Mental status is at baseline. Psychiatric:         Mood and Affect: Mood normal.         Behavior: Behavior normal.            1. Essential hypertension  Blood pressure is not controlled and going to increase medicine as directed  Encouraged to check readings at home and bring to next appointment    - lisinopril-hydroCHLOROthiazide (PRINZIDE, ZESTORETIC) 20-25 mg per tablet; Take 1 Tablet by mouth daily. Dispense: 90 Tablet; Refill: 0  - amLODIPine (NORVASC) 5 mg tablet; Take 1 tablet every day by oral route for 90 days. Dispense: 90 Tablet;  Refill: 0    2. Mixed hyperlipidemia  Lab Results   Component Value Date/Time    LDL, calculated 134 (H) 09/22/2021 09:09 AM   She is on atorvastatin and will check with labs in 1 month  - atorvastatin (LIPITOR) 20 mg tablet; Take 1 Tablet by mouth daily. Dispense: 90 Tablet; Refill: 0    3. Class 1 obesity due to excess calories with serious comorbidity and body mass index (BMI) of 33.0 to 33.9 in adult  Continue to encourage weight loss with decreasing excess fat, salt and sugar in diet along with getting regular exercise 3-5 times weekly for 30-45 minutes consistently. 4. Tobacco dependence  Continue to encourage smoking cessation      Patient verbalizes understanding of plan of care as discussed above    Follow-up and Dispositions    · Return in about 4 weeks (around 2/24/2022) for or sooner for worsening symptoms.

## 2022-03-19 PROBLEM — Z86.0100 PERSONAL HISTORY OF COLONIC POLYPS: Status: ACTIVE | Noted: 2021-11-17

## 2022-03-19 PROBLEM — Z86.010 PERSONAL HISTORY OF COLONIC POLYPS: Status: ACTIVE | Noted: 2021-11-17

## 2022-03-19 PROBLEM — I10 HYPERTENSION: Status: ACTIVE | Noted: 2020-12-10

## 2022-03-20 PROBLEM — E87.6 HYPOKALEMIA: Status: ACTIVE | Noted: 2020-12-10

## 2022-09-28 ENCOUNTER — HOSPITAL ENCOUNTER (EMERGENCY)
Age: 62
Discharge: HOME | End: 2022-09-28
Attending: EMERGENCY MEDICINE
Payer: OTHER GOVERNMENT

## 2022-09-28 VITALS
BODY MASS INDEX: 33.8 KG/M2 | RESPIRATION RATE: 18 BRPM | TEMPERATURE: 97.7 F | WEIGHT: 198 LBS | HEIGHT: 64 IN | HEART RATE: 76 BPM | SYSTOLIC BLOOD PRESSURE: 172 MMHG | DIASTOLIC BLOOD PRESSURE: 102 MMHG | OXYGEN SATURATION: 99 %

## 2022-09-28 DIAGNOSIS — M10.9 ACUTE GOUT OF LEFT WRIST, UNSPECIFIED CAUSE: Primary | ICD-10-CM

## 2022-09-28 LAB
ANION GAP SERPL CALC-SCNC: 11 MMOL/L (ref 5–15)
BUN SERPL-MCNC: 16 MG/DL (ref 6–20)
BUN/CREAT SERPL: 13 (ref 12–20)
CA-I BLD-MCNC: 9.2 MG/DL (ref 8.5–10.1)
CHLORIDE SERPL-SCNC: 104 MMOL/L (ref 97–108)
CO2 SERPL-SCNC: 24 MMOL/L (ref 21–32)
CREAT SERPL-MCNC: 1.24 MG/DL (ref 0.55–1.02)
GLUCOSE SERPL-MCNC: 92 MG/DL (ref 65–100)
POTASSIUM SERPL-SCNC: 3.6 MMOL/L (ref 3.5–5.1)
SODIUM SERPL-SCNC: 139 MMOL/L (ref 136–145)
URATE SERPL-MCNC: 6.4 MG/DL (ref 2.6–6)

## 2022-09-28 PROCEDURE — 96372 THER/PROPH/DIAG INJ SC/IM: CPT

## 2022-09-28 PROCEDURE — 84550 ASSAY OF BLOOD/URIC ACID: CPT

## 2022-09-28 PROCEDURE — 74011250636 HC RX REV CODE- 250/636: Performed by: EMERGENCY MEDICINE

## 2022-09-28 PROCEDURE — 36415 COLL VENOUS BLD VENIPUNCTURE: CPT

## 2022-09-28 PROCEDURE — 99284 EMERGENCY DEPT VISIT MOD MDM: CPT

## 2022-09-28 PROCEDURE — 80048 BASIC METABOLIC PNL TOTAL CA: CPT

## 2022-09-28 PROCEDURE — 74011250637 HC RX REV CODE- 250/637: Performed by: EMERGENCY MEDICINE

## 2022-09-28 RX ORDER — COLCHICINE 0.6 MG/1
0.6 TABLET ORAL 2 TIMES DAILY
Qty: 10 TABLET | Refills: 1 | Status: SHIPPED | OUTPATIENT
Start: 2022-09-28

## 2022-09-28 RX ORDER — HYDROCODONE BITARTRATE AND ACETAMINOPHEN 5; 325 MG/1; MG/1
1 TABLET ORAL
Qty: 10 TABLET | Refills: 0 | Status: SHIPPED | OUTPATIENT
Start: 2022-09-28 | End: 2022-10-01

## 2022-09-28 RX ORDER — IBUPROFEN 600 MG/1
600 TABLET ORAL
Qty: 20 TABLET | Refills: 0 | Status: SHIPPED | OUTPATIENT
Start: 2022-09-28

## 2022-09-28 RX ORDER — METHYLPREDNISOLONE ACETATE 40 MG/ML
40 INJECTION, SUSPENSION INTRA-ARTICULAR; INTRALESIONAL; INTRAMUSCULAR; SOFT TISSUE ONCE
Status: COMPLETED | OUTPATIENT
Start: 2022-09-28 | End: 2022-09-28

## 2022-09-28 RX ORDER — COLCHICINE 0.6 MG/1
0.6 TABLET ORAL
Status: COMPLETED | OUTPATIENT
Start: 2022-09-28 | End: 2022-09-28

## 2022-09-28 RX ORDER — IBUPROFEN 800 MG/1
800 TABLET ORAL ONCE
Status: COMPLETED | OUTPATIENT
Start: 2022-09-28 | End: 2022-09-28

## 2022-09-28 RX ADMIN — COLCHICINE 0.6 MG: 0.6 TABLET, FILM COATED ORAL at 18:19

## 2022-09-28 RX ADMIN — IBUPROFEN 800 MG: 800 TABLET, FILM COATED ORAL at 18:19

## 2022-09-28 RX ADMIN — METHYLPREDNISOLONE ACETATE 40 MG: 40 INJECTION, SUSPENSION INTRA-ARTICULAR; INTRALESIONAL; INTRAMUSCULAR; INTRASYNOVIAL; SOFT TISSUE at 18:18

## 2022-09-28 NOTE — ED PROVIDER NOTES
HPI 52-year-old female with hypertension previous history of gout presents the ED with left wrist and hand swelling and pain which began last night it was worse today. 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 Prior episodes of gout or pain in the feet    Past Medical History:   Diagnosis Date    Hypertension 12/10/2020    Hypokalemia 12/10/2020    Menopause     Personal history of colonic polyps 11/17/2021       Past Surgical History:   Procedure Laterality Date    COLONOSCOPY N/A 12/3/2021    COLONOSCOPY (TIVA) performed by Gladys Castro MD at [facility] ENDOSCOPY    HX HYSTERECTOMY      partial         Family History:   Problem Relation Age of Onset    Hypertension Mother     Breast Cancer Maternal Grandmother     Breast Cancer Paternal Grandmother     Cancer Father        Social History     Socioeconomic History    Marital status:      Spouse name: Not on file    Number of children: Not on file    Years of education: Not on file    Highest education level: Not on file   Occupational History    Not on file   Tobacco Use    Smoking status: Every Day     Packs/day: 1.00     Types: Cigarettes    Smokeless tobacco: Never   Vaping Use    Vaping Use: Never used   Substance and Sexual Activity    Alcohol use: Not Currently    Drug use: Never    Sexual activity: Not on file   Other Topics Concern    Not on file   Social History Narrative    Not on file     Social Determinants of Health     Financial Resource Strain: Not on file   Food Insecurity: Not on file   Transportation Needs: Not on file   Physical Activity: Not on file   Stress: Not on file   Social Connections: Not on file   Intimate Partner Violence: Not on file   Housing Stability: Not on file         ALLERGIES: Patient has no known allergies. Review of Systems   Constitutional: Negative. HENT: Negative. Eyes: Negative. Respiratory:  Negative for cough, chest tightness, shortness of breath and wheezing.     Cardiovascular: Negative for chest pain, palpitations and leg swelling. Gastrointestinal:  Negative for abdominal distention, abdominal pain, blood in stool, constipation, diarrhea, nausea and vomiting. Endocrine: Negative. Genitourinary:  Negative for difficulty urinating, dysuria, flank pain, frequency and hematuria. Musculoskeletal:  Positive for arthralgias and joint swelling. Negative for back pain, myalgias, neck pain and neck stiffness. Neurological:  Negative for dizziness, seizures, speech difficulty, weakness and numbness. Hematological: Negative. Psychiatric/Behavioral: Negative. Vitals:    09/28/22 1743   BP: (!) 172/102   Pulse: 76   Resp: 18   Temp: 97.7 °F (36.5 °C)   SpO2: 99%   Weight: 89.8 kg (198 lb)   Height: 5' 4\" (1.626 m)            Physical Exam  Vitals and nursing note reviewed. Constitutional:       General: She is not in acute distress. Appearance: Normal appearance. She is not ill-appearing, toxic-appearing or diaphoretic. HENT:      Head: Normocephalic and atraumatic. Nose: Nose normal.      Mouth/Throat:      Mouth: Mucous membranes are moist.   Eyes:      Extraocular Movements: Extraocular movements intact. Conjunctiva/sclera: Conjunctivae normal.   Cardiovascular:      Rate and Rhythm: Normal rate and regular rhythm. Pulses: Normal pulses. Heart sounds: Normal heart sounds. No murmur heard. Pulmonary:      Effort: Pulmonary effort is normal. No respiratory distress. Breath sounds: Normal breath sounds. No wheezing, rhonchi or rales. Abdominal:      General: Bowel sounds are normal. There is no distension. Palpations: Abdomen is soft. There is no mass. Tenderness: There is no abdominal tenderness. There is no right CVA tenderness, left CVA tenderness, guarding or rebound. Hernia: No hernia is present. Musculoskeletal:         General: Swelling and tenderness present. No deformity or signs of injury. Normal range of motion. Cervical back: Normal range of motion and neck supple. No rigidity or tenderness. Right lower leg: No edema. Left lower leg: No edema. Comments: The left wrist and dorsal hand involving the second third MCP and wrist joints is swollen tender and warm; there is good capillary refill the fingers normal sensation in the fingers painful to manipulate the hand   Lymphadenopathy:      Cervical: No cervical adenopathy. Skin:     General: Skin is warm and dry. Capillary Refill: Capillary refill takes less than 2 seconds. Findings: No erythema, lesion or rash. Neurological:      General: No focal deficit present. Mental Status: She is alert and oriented to person, place, and time. Mental status is at baseline. Cranial Nerves: No cranial nerve deficit. Sensory: No sensory deficit. Psychiatric:         Mood and Affect: Mood normal.         Behavior: Behavior normal.         Thought Content: Thought content normal.        MDM  Likely this appears to be an acute gout attack of the left wrist and MCP joints of the hand.   We will treat with Depo-Medrol colchicine and ibuprofen Norco as needed suggest follow-up tomorrow with PCP [facility]       Procedures

## 2022-09-28 NOTE — ED TRIAGE NOTES
Cleaned house yesterday and left hand started swelling, was more swollen this am, limited ROM, denies injury

## 2022-09-28 NOTE — Clinical Note
[address]  [facility] EMERGENCY DEPT  [id number]  [phone]    Work/School Note    Date: 9/28/2022    To Whom It May concern:    Kisha Carpenter was seen and treated today in the emergency room by the following provider(s):  Attending Provider: Joseph Quezada MD.      Kisha Carpenter is excused from work/school on 9/28/2022 through 9/30/2022. She is medically clear to return to work/school on 10/1/2022.          Sincerely,          Tita Murillo RN

## 2022-09-28 NOTE — Clinical Note
[address]  [facility] EMERGENCY DEPT  [place] [phone]  [phone]    Work/School Note    Date: 9/28/2022    To Whom It May concern:    Ann Marie Harden was seen and treated today in the emergency room by the following provider(s):  Attending Provider: Aries Huang MD.      Ann Marie Harden is excused from work/school on 9/28/2022 through 9/30/2022. She is medically clear to return to work/school on 10/1/2022.          Sincerely,          Stas Acosta MD

## 2023-02-27 ENCOUNTER — OFFICE VISIT (OUTPATIENT)
Dept: PRIMARY CARE CLINIC | Age: 63
End: 2023-02-27
Payer: OTHER GOVERNMENT

## 2023-02-27 VITALS
HEART RATE: 95 BPM | DIASTOLIC BLOOD PRESSURE: 90 MMHG | SYSTOLIC BLOOD PRESSURE: 160 MMHG | BODY MASS INDEX: 33.8 KG/M2 | WEIGHT: 198 LBS | TEMPERATURE: 98.3 F | OXYGEN SATURATION: 95 % | RESPIRATION RATE: 18 BRPM | HEIGHT: 64 IN

## 2023-02-27 DIAGNOSIS — Z12.31 BREAST CANCER SCREENING BY MAMMOGRAM: Primary | ICD-10-CM

## 2023-02-27 DIAGNOSIS — I10 ESSENTIAL HYPERTENSION: ICD-10-CM

## 2023-02-27 DIAGNOSIS — R20.0 NUMBNESS AND TINGLING IN LEFT HAND: ICD-10-CM

## 2023-02-27 DIAGNOSIS — E05.90 HYPERTHYROIDISM: ICD-10-CM

## 2023-02-27 DIAGNOSIS — E78.2 MIXED HYPERLIPIDEMIA: ICD-10-CM

## 2023-02-27 DIAGNOSIS — M54.41 ACUTE RIGHT-SIDED LOW BACK PAIN WITH RIGHT-SIDED SCIATICA: ICD-10-CM

## 2023-02-27 DIAGNOSIS — R20.2 NUMBNESS AND TINGLING IN LEFT HAND: ICD-10-CM

## 2023-02-27 DIAGNOSIS — Z87.891 PERSONAL HISTORY OF TOBACCO USE, PRESENTING HAZARDS TO HEALTH: ICD-10-CM

## 2023-02-27 DIAGNOSIS — Z00.00 WELLNESS EXAMINATION: ICD-10-CM

## 2023-02-27 PROCEDURE — 99396 PREV VISIT EST AGE 40-64: CPT

## 2023-02-27 PROCEDURE — 3078F DIAST BP <80 MM HG: CPT

## 2023-02-27 PROCEDURE — 3074F SYST BP LT 130 MM HG: CPT

## 2023-02-27 PROCEDURE — 99213 OFFICE O/P EST LOW 20 MIN: CPT

## 2023-02-27 PROCEDURE — G0296 VISIT TO DETERM LDCT ELIG: HCPCS

## 2023-02-27 RX ORDER — DEXAMETHASONE SODIUM PHOSPHATE 4 MG/ML
8 INJECTION, SOLUTION INTRA-ARTICULAR; INTRALESIONAL; INTRAMUSCULAR; INTRAVENOUS; SOFT TISSUE ONCE
Status: COMPLETED | OUTPATIENT
Start: 2023-02-27 | End: 2023-02-27

## 2023-02-27 RX ORDER — AMLODIPINE BESYLATE 5 MG/1
5 TABLET ORAL DAILY
Qty: 90 TABLET | Refills: 1 | Status: SHIPPED | OUTPATIENT
Start: 2023-02-27 | End: 2023-02-27 | Stop reason: SDUPTHER

## 2023-02-27 RX ORDER — ATORVASTATIN CALCIUM 20 MG/1
20 TABLET, FILM COATED ORAL DAILY
Qty: 30 TABLET | Refills: 0 | Status: SHIPPED | OUTPATIENT
Start: 2023-02-27 | End: 2023-03-01

## 2023-02-27 RX ORDER — ATORVASTATIN CALCIUM 20 MG/1
20 TABLET, FILM COATED ORAL DAILY
Qty: 30 TABLET | Refills: 0 | Status: SHIPPED | OUTPATIENT
Start: 2023-02-27 | End: 2023-02-27 | Stop reason: SDUPTHER

## 2023-02-27 RX ORDER — AMLODIPINE BESYLATE 5 MG/1
TABLET ORAL
Qty: 30 TABLET | Refills: 0 | Status: SHIPPED | OUTPATIENT
Start: 2023-02-27 | End: 2023-02-27 | Stop reason: SDUPTHER

## 2023-02-27 RX ORDER — AMLODIPINE BESYLATE 5 MG/1
5 TABLET ORAL DAILY
Qty: 30 TABLET | Refills: 0 | Status: SHIPPED | OUTPATIENT
Start: 2023-02-27 | End: 2023-02-28 | Stop reason: ALTCHOICE

## 2023-02-27 RX ORDER — PREDNISONE 20 MG/1
20 TABLET ORAL DAILY
Qty: 5 TABLET | Refills: 0 | Status: SHIPPED | OUTPATIENT
Start: 2023-02-27 | End: 2023-02-28

## 2023-02-27 RX ORDER — ATORVASTATIN CALCIUM 20 MG/1
20 TABLET, FILM COATED ORAL DAILY
Qty: 90 TABLET | Refills: 1 | Status: SHIPPED | OUTPATIENT
Start: 2023-02-27 | End: 2023-02-27 | Stop reason: SDUPTHER

## 2023-02-27 RX ORDER — LISINOPRIL AND HYDROCHLOROTHIAZIDE 20; 25 MG/1; MG/1
1 TABLET ORAL DAILY
Qty: 90 TABLET | Refills: 1 | Status: SHIPPED | OUTPATIENT
Start: 2023-02-27 | End: 2023-02-27 | Stop reason: SDUPTHER

## 2023-02-27 RX ORDER — LISINOPRIL AND HYDROCHLOROTHIAZIDE 20; 25 MG/1; MG/1
1 TABLET ORAL DAILY
Qty: 30 TABLET | Refills: 0 | Status: SHIPPED | OUTPATIENT
Start: 2023-02-27

## 2023-02-27 RX ORDER — COLCHICINE 0.6 MG/1
0.6 TABLET ORAL 2 TIMES DAILY
Qty: 10 TABLET | Refills: 1 | Status: CANCELLED | OUTPATIENT
Start: 2023-02-27

## 2023-02-27 RX ORDER — LISINOPRIL AND HYDROCHLOROTHIAZIDE 20; 25 MG/1; MG/1
1 TABLET ORAL DAILY
Qty: 30 TABLET | Refills: 0 | Status: SHIPPED | OUTPATIENT
Start: 2023-02-27 | End: 2023-02-27 | Stop reason: SDUPTHER

## 2023-02-27 RX ADMIN — DEXAMETHASONE SODIUM PHOSPHATE 8 MG: 4 INJECTION, SOLUTION INTRA-ARTICULAR; INTRALESIONAL; INTRAMUSCULAR; INTRAVENOUS; SOFT TISSUE at 17:02

## 2023-02-27 NOTE — PATIENT INSTRUCTIONS

## 2023-02-27 NOTE — PROGRESS NOTES
Ron Talbot is a 65 y.o. female who presents to the office today for the following:    Chief Complaint   Patient presents with    Physical     Patient regusting yearly check up and medication refill     Hypertension     Patient ran out of B/P meds x 2 weeks        Past Medical History:   Diagnosis Date    Hypercholesterolemia     Hypertension 12/10/2020    Hypokalemia 12/10/2020    Menopause     Personal history of colonic polyps 11/17/2021       Past Surgical History:   Procedure Laterality Date    COLONOSCOPY N/A 12/3/2021    COLONOSCOPY (TIVA) performed by Jourdan Staples MD at [facility] ENDOSCOPY    HX HYSTERECTOMY      partial        Family History   Problem Relation Age of Onset    Hypertension Mother     Breast Cancer Maternal Grandmother     Breast Cancer Paternal Grandmother     Cancer Father         Social History     Tobacco Use    Smoking status: Every Day     Packs/day: 1.00     Types: Cigarettes    Smokeless tobacco: Never   Vaping Use    Vaping Use: Never used   Substance Use Topics    Alcohol use: Not Currently    Drug use: Never        HPI  Patient here for annual wellness visit and medication refills with [facility] HTN, hyperlipidemia, tobacco dependence, and colonic polyps. Last colonoscopy 12/2021 with Surendra Vickers and recommended repeat in 12/2023. Last mammogram 2021. Last PAP \"many years ago\". Patient endorses smoking 1 PPD cigarettes. Patient states she has been out of BP medication for 5-6 days. Patient does endorse some left hand numbness and tingling for approx 1 month, intermittent, last several minutes daily. Patient also endorse right hip/buttock pain that radiates down right leg after pulling on patient at work on 2/25/2023. Otherwise denies complaints today. Current Outpatient Medications on File Prior to Visit   Medication Sig    colchicine 0.6 mg tablet Take 1 Tablet by mouth two (2) times a day.     ibuprofen (MOTRIN) 600 mg tablet Take 1 Tablet by mouth every eight (8) hours as needed for Pain (Take 3 times daily for the first 2 days then as needed). [DISCONTINUED] lisinopril-hydroCHLOROthiazide (PRINZIDE, ZESTORETIC) 20-25 mg per tablet Take 1 Tablet by mouth daily. [DISCONTINUED] atorvastatin (LIPITOR) 20 mg tablet Take 1 Tablet by mouth daily. [DISCONTINUED] amLODIPine (NORVASC) 5 mg tablet Take 1 tablet every day by oral route for 90 days. No current facility-administered medications on file prior to visit. Medications Ordered Today   Medications    DISCONTD: atorvastatin (LIPITOR) 20 mg tablet     Sig: Take 1 Tablet by mouth daily. Dispense:  30 Tablet     Refill:  0    DISCONTD: amLODIPine (NORVASC) 5 mg tablet     Sig: Take 1 tablet every day by oral route for 90 days. Dispense:  30 Tablet     Refill:  0    DISCONTD: lisinopril-hydroCHLOROthiazide (PRINZIDE, ZESTORETIC) 20-25 mg per tablet     Sig: Take 1 Tablet by mouth daily. Dispense:  30 Tablet     Refill:  0    amLODIPine (NORVASC) 5 mg tablet     Sig: Take 1 Tablet by mouth daily. Take 1 tablet every day by oral route for 90 days. Dispense:  90 Tablet     Refill:  1    atorvastatin (LIPITOR) 20 mg tablet     Sig: Take 1 Tablet by mouth daily. Dispense:  90 Tablet     Refill:  1    lisinopril-hydroCHLOROthiazide (PRINZIDE, ZESTORETIC) 20-25 mg per tablet     Sig: Take 1 Tablet by mouth daily. Dispense:  90 Tablet     Refill:  1    predniSONE (DELTASONE) 20 mg tablet     Sig: Take 1 Tablet by mouth daily. Dispense:  5 Tablet     Refill:  0        Review of Systems   Constitutional:  Negative for chills, diaphoresis, fever and malaise/fatigue. HENT: Negative. Eyes:  Negative for blurred vision, double vision, photophobia, pain, discharge and redness. Respiratory:  Negative for cough, hemoptysis, sputum production, shortness of breath and wheezing. Cardiovascular:  Negative for chest pain, palpitations and leg swelling.    Gastrointestinal:  Negative for abdominal pain, blood in stool, constipation, diarrhea, heartburn, melena, nausea and vomiting. Genitourinary:  Negative for dysuria, flank pain, frequency, hematuria and urgency. Musculoskeletal:  Positive for back pain and myalgias. Right hip/buttock pain that radiates down leg   Neurological:  Positive for tingling. Negative for dizziness, tremors, sensory change, speech change, focal weakness, seizures, loss of consciousness, weakness and headaches. Left hand, intermittent numbness and tingling lasting several minutes daily for 1 month   Psychiatric/Behavioral:  Negative for depression, hallucinations, memory loss, substance abuse and suicidal ideas. The patient is not nervous/anxious and does not have insomnia. Visit Vitals  BP (!) 160/90   Pulse 95   Temp 98.3 °F (36.8 °C) (Oral)   Resp 18   Ht 5' 4\" (1.626 m)   Wt 198 lb (89.8 kg)   SpO2 95%   BMI 33.99 kg/m²       Last Point of Care HGB A1C  No results found for: [id number]      Physical Exam  Constitutional:       Appearance: Normal appearance. She is obese. HENT:      Head: Normocephalic and atraumatic. Right Ear: Tympanic membrane, ear canal and external ear normal.      Left Ear: Tympanic membrane, ear canal and external ear normal.      Nose: Nose normal.      Mouth/Throat:      Mouth: Mucous membranes are moist.   Eyes:      Extraocular Movements: Extraocular movements intact. Conjunctiva/sclera: Conjunctivae normal.      Pupils: Pupils are equal, round, and reactive to light. Cardiovascular:      Rate and Rhythm: Normal rate and regular rhythm. Heart sounds: Normal heart sounds. No murmur heard. No friction rub. No gallop. Pulmonary:      Effort: Pulmonary effort is normal.      Breath sounds: Normal breath sounds. Abdominal:      General: Abdomen is flat. Bowel sounds are normal.      Palpations: Abdomen is soft. Musculoskeletal:         General: Tenderness present. Normal range of motion.       Comments: Palpation of right lower back/buttock   Skin:     General: Skin is warm and dry. Neurological:      General: No focal deficit present. Mental Status: She is alert and oriented to person, place, and time. Psychiatric:         Mood and Affect: Mood normal.         Behavior: Behavior normal.         Thought Content: Thought content normal.         Judgment: Judgment normal.        1. Wellness examination      Tenderness noted with palpation of right lower back/buttock. Otherwise exam WNL. 2. Breast cancer screening by mammogram  -     [facility] MAMMO BI SCREENING INCL CAD; Future  Will order mammogram now. 3. Mixed hyperlipidemia  Lab Results   Component Value Date/Time    LDL, calculated 134 (H) 09/22/2021 09:09 AM    -     atorvastatin (LIPITOR) 20 mg tablet; Take 1 Tablet by mouth daily. , Normal, Disp-90 Tablet, R-1  -     atorvastatin (LIPITOR) 20 mg tablet; Take 1 Tablet by mouth daily. , Normal, Disp-30 Tablet, R-0  -     LIPID PANEL  Will send 30 day supply of Atorvastatin to [facility] for immediate resumption of daily medication. Will also send 90 day supply of atorvastatin for mail order for future filling. Will check fasting labs. Decrease intake of fried fatty greasy foods. 4. Essential hypertension        This is not at goal of < 140/90. Patient has been out of medication for 5-6 days. -     amLODIPine (NORVASC) 5 mg tablet; Take 1 Tablet by mouth daily. Take 1 tablet every day by oral route for 30 days. , Normal, Disp-30 Tablet, R-0  -     amLODIPine (NORVASC) 5 mg tablet; Take 1 Tablet by mouth daily. Take 1 tablet every day by oral route for 90 days. , Normal, Disp-90 Tablet, R-1  -     lisinopril-hydroCHLOROthiazide (PRINZIDE, ZESTORETIC) 20-25 mg per tablet; Take 1 Tablet by mouth daily. , Normal, Disp-90 Tablet, R-1  -     lisinopril-hydroCHLOROthiazide (PRINZIDE, ZESTORETIC) 20-25 mg per tablet; Take 1 Tablet by mouth daily. , Normal, Disp-30 Tablet, R-0  -     TSH RFX ON ABNORMAL TO FREE T4  - URINALYSIS W/ RFLX MICROSCOPIC  -     METABOLIC PANEL, COMPREHENSIVE  -     CBC WITH AUTOMATED DIFF  Will send 30 day supply of medications to [facility] now for immediate resumption of daily medication. Will also send 90 day supply for mail order for future filling. Will check fasting labs. Return on 2/28/2023 and will repeat BP during PAP and fasting labs. Take medication as directed. Do not miss doses. Contact provider if refills needed. Check BP at home and bring readings to next visit for review. 5. Acute right-sided low back pain with right-sided sciatica  -     predniSONE (DELTASONE) 20 mg tablet; Take 1 Tablet by mouth daily. , Normal, Disp-5 Tablet, R-0  Will treat with 8mg IM decadron now and follow with 5 days of prednisone. Take as directed. Massage and heat therapy/gentle stretches recommended. Contact provider if no improvement in 1-2 weeks. Will consider PT and imaging. 6. Personal history of tobacco use, presenting hazards to health  -     COUNSELING VISIT TO DISCUSS NEED FOR LUNG CANCER SCREENING  -     CT LOW DOSE LUNG CANCER SCREENING; Future  LDLCT ordered for lung cancer screening. 7. Numbness and tingling in left hand  -     REFERRAL TO ORTHOPEDICS  Referral sent to [facility]. Return on 2/28/2023 for fasting labs, BP recheck and PAP. We discussed the expected course, resolution and complications of the diagnosis(es) in detail. Medication risks, benefits, costs, interactions, and alternatives were discussed as indicated. I advised her to contact the office if her condition worsens, changes or fails to improve as anticipated. She expressed understanding with the diagnosis(es) and plan. Patient verbalizes understanding of plan of care as discussed above    Follow-up and Dispositions    Return in about 1 day (around 2/28/2023), or if symptoms worsen or fail to improve, for PAP and fasting labs.             Discussed with the patient the current USPSTF guidelines released March 9, 2021 for screening for lung cancer. For adults aged 48 to [de-identified] years who have a 20 pack-year smoking history and currently smoke or have quit within the past 15 years the grade B recommendation is to:  Screen for lung cancer with low-dose computed tomography (LDCT) every year. Stop screening once a person has not smoked for 15 years or has a health problem that limits life expectancy or the ability to have lung surgery. The patient  reports that she has been smoking cigarettes. She has been smoking an average of 1 pack per day. She has never used smokeless tobacco..  Discussed with patient the risks and benefits of screening, including over-diagnosis, false positive rate, and total radiation exposure. The patient currently exhibits no signs or symptoms suggestive of lung cancer. Discussed with patient the importance of compliance with yearly annual lung cancer screenings and willingness to undergo diagnosis and treatment if screening scan is positive. In addition, the patient was counseled regarding the importance of remaining smoke free and/or total smoking cessation.     Also reviewed the following if the patient has Medicare that as of February 10, 2022, Medicare only covers LDCT screening in patients aged 51-72 with at least a 20 pack-year smoking history who currently smoke or have quit in the last 15 years

## 2023-02-27 NOTE — PROGRESS NOTES
Chief Complaint   Patient presents with    Physical     Patient regusting yearly check up and medication refill     Hypertension     Patient ran out of B/P meds x 2 weeks      1. \"Have you been to the ER, urgent care clinic since your last visit? Hospitalized since your last visit? \" No    2. \"Have you seen or consulted any other health care providers outside of the [address] [place] [address] since your last visit? \" No     3. For patients aged 39-70: Has the patient had a colonoscopy / FIT/ Cologuard? Yes - no Care Gap present      If the patient is female:    4. For patients aged 41-77: Has the patient had a mammogram within the past 2 years? Yes - no Care Gap present      5. For patients aged 21-65: Has the patient had a pap smear?  Yes - no Care Gap present  Visit Vitals  BP (!) 160/90   Pulse 95   Temp 98.3 °F (36.8 °C) (Oral)   Resp 18   Ht 5' 4\" (1.626 m)   Wt 198 lb (89.8 kg)   SpO2 95%   BMI 33.99 kg/m²

## 2023-02-28 ENCOUNTER — OFFICE VISIT (OUTPATIENT)
Dept: PRIMARY CARE CLINIC | Age: 63
End: 2023-02-28
Payer: OTHER GOVERNMENT

## 2023-02-28 ENCOUNTER — TELEPHONE (OUTPATIENT)
Dept: PRIMARY CARE CLINIC | Age: 63
End: 2023-02-28

## 2023-02-28 VITALS
HEART RATE: 74 BPM | HEIGHT: 64 IN | BODY MASS INDEX: 33.63 KG/M2 | SYSTOLIC BLOOD PRESSURE: 150 MMHG | DIASTOLIC BLOOD PRESSURE: 97 MMHG | WEIGHT: 197 LBS | TEMPERATURE: 98 F | OXYGEN SATURATION: 95 % | RESPIRATION RATE: 18 BRPM

## 2023-02-28 DIAGNOSIS — Z01.419 WOMEN'S ANNUAL ROUTINE GYNECOLOGICAL EXAMINATION: ICD-10-CM

## 2023-02-28 DIAGNOSIS — Z12.4 ENCOUNTER FOR PAPANICOLAOU SMEAR FOR CERVICAL CANCER SCREENING: Primary | ICD-10-CM

## 2023-02-28 DIAGNOSIS — I10 PRIMARY HYPERTENSION: ICD-10-CM

## 2023-02-28 DIAGNOSIS — Z00.00 NORMAL FEMALE BREAST EXAM: ICD-10-CM

## 2023-02-28 PROCEDURE — 99396 PREV VISIT EST AGE 40-64: CPT

## 2023-02-28 RX ORDER — PREDNISONE 20 MG/1
20 TABLET ORAL DAILY
Qty: 5 TABLET | Refills: 0 | Status: SHIPPED | OUTPATIENT
Start: 2023-02-28

## 2023-02-28 RX ORDER — AMLODIPINE BESYLATE 10 MG/1
10 TABLET ORAL DAILY
Qty: 90 TABLET | Refills: 1 | Status: SHIPPED | OUTPATIENT
Start: 2023-02-28

## 2023-02-28 NOTE — TELEPHONE ENCOUNTER
Pt would like a call to discuss medications. She said they pahrmacy only had 3 medications for her and she needs the pain medication and she wlso wants her meds sent to mail order so she can get a 90 day supply.  Please call pt

## 2023-02-28 NOTE — PROGRESS NOTES
Chief Complaint   Patient presents with    Well Woman     1. \"Have you been to the ER, urgent care clinic since your last visit? Hospitalized since your last visit? \" No    2. \"Have you seen or consulted any other health care providers outside of the [address] [place] [address] since your last visit? \" No     3. For patients aged 39-70: Has the patient had a colonoscopy / FIT/ Cologuard? Yes - no Care Gap present      If the patient is female:    4. For patients aged 41-77: Has the patient had a mammogram within the past 2 years? Yes - no Care Gap present      5. For patients aged 21-65: Has the patient had a pap smear?  Yes - no Care Gap present  Visit Vitals  BP (!) 150/97 (BP 1 Location: Left arm, BP Patient Position: Sitting, BP Cuff Size: Adult) Comment: no meds this moring d/t labs   Pulse 74   Temp 98 °F (36.7 °C) (Oral)   Resp 18   Ht 5' 4\" (1.626 m)   Wt 197 lb (89.4 kg)   SpO2 95%   BMI 33.81 kg/m²

## 2023-02-28 NOTE — PROGRESS NOTES
Mariama Montalvo is a 65 y.o. female who presents to the office today for the following:    Chief Complaint   Patient presents with    Well Woman       Past Medical History:   Diagnosis Date    Hypercholesterolemia     Hypertension 12/10/2020    Hypokalemia 12/10/2020    Menopause     Personal history of colonic polyps 11/17/2021       Past Surgical History:   Procedure Laterality Date    COLONOSCOPY N/A 12/3/2021    COLONOSCOPY (TIVA) performed by Tanisha Dunn MD at [facility] ENDOSCOPY    HX HYSTERECTOMY      partial        Family History   Problem Relation Age of Onset    Hypertension Mother     Breast Cancer Maternal Grandmother     Breast Cancer Paternal Grandmother     Cancer Father         Social History     Tobacco Use    Smoking status: Every Day     Packs/day: 1.00     Types: Cigarettes    Smokeless tobacco: Never   Vaping Use    Vaping Use: Never used   Substance Use Topics    Alcohol use: Not Currently    Drug use: Never        HPI  Patient here for well woman exam with PAP and breast exam with [facility] HTN, hyperlipidemia, tobacco dependence, and colonic polyps. Does not perform self breast exams at home. Last colonoscopy 12/2021 with Anayeli Castellano and recommended repeat in 12/2023. Last mammogram 2021. Last PAP \"many years ago\". Patient endorses smoking 1 PPD cigarettes. Patient stated she just started taking BP medications on 2/27/2023 after being out for 5-6 days. She did not take medication this morning because she was fasting for labs. Denies vaginal complaints today. Denies desire for STD screening swabs/      Current Outpatient Medications on File Prior to Visit   Medication Sig    predniSONE (DELTASONE) 20 mg tablet Take 1 Tablet by mouth daily. amLODIPine (NORVASC) 5 mg tablet Take 1 Tablet by mouth daily. Take 1 tablet every day by oral route for 90 days. atorvastatin (LIPITOR) 20 mg tablet Take 1 Tablet by mouth daily.     lisinopril-hydroCHLOROthiazide (PRINZIDE, ZESTORETIC) 20-25 mg per tablet Take 1 Tablet by mouth daily. colchicine 0.6 mg tablet Take 1 Tablet by mouth two (2) times a day. ibuprofen (MOTRIN) 600 mg tablet Take 1 Tablet by mouth every eight (8) hours as needed for Pain (Take 3 times daily for the first 2 days then as needed). Current Facility-Administered Medications on File Prior to Visit   Medication    [COMPLETED] dexamethasone (DECADRON) 4 mg/mL injection 8 mg        No orders of the defined types were placed in this encounter. Review of Systems   Constitutional:  Negative for chills, diaphoresis, fever and malaise/fatigue. HENT: Negative. Eyes:  Negative for blurred vision, double vision, photophobia, pain, discharge and redness. Respiratory:  Negative for cough, hemoptysis, sputum production, shortness of breath and wheezing. Cardiovascular:  Negative for chest pain, palpitations and leg swelling. Gastrointestinal:  Negative for abdominal pain, blood in stool, constipation, diarrhea, heartburn, melena, nausea and vomiting. Genitourinary:  Negative for dysuria, flank pain, frequency, hematuria and urgency. Musculoskeletal:  Positive for back pain and myalgias. Right hip/buttock pain that radiates down leg   Neurological:  Positive for tingling. Negative for dizziness, tremors, sensory change, speech change, focal weakness, seizures, loss of consciousness, weakness and headaches. Left hand, intermittent numbness and tingling lasting several minutes daily for 1 month   Psychiatric/Behavioral:  Negative for depression, hallucinations, memory loss, substance abuse and suicidal ideas. The patient is not nervous/anxious and does not have insomnia.       Visit Vitals  BP (!) 150/97 (BP 1 Location: Left arm, BP Patient Position: Sitting, BP Cuff Size: Adult) Comment: no meds this moring d/t labs   Pulse 74   Temp 98 °F (36.7 °C) (Oral)   Resp 18   Ht 5' 4\" (1.626 m)   Wt 197 lb (89.4 kg)   SpO2 95%   BMI 33.81 kg/m²       Last Point of Care HGB A1C  No results found for: [id number]      Physical Exam  Constitutional:       Appearance: Normal appearance. She is obese. HENT:      Head: Normocephalic and atraumatic. Right Ear: Tympanic membrane, ear canal and external ear normal.      Left Ear: Tympanic membrane, ear canal and external ear normal.      Nose: Nose normal.      Mouth/Throat:      Mouth: Mucous membranes are moist.   Eyes:      Extraocular Movements: Extraocular movements intact. Conjunctiva/sclera: Conjunctivae normal.      Pupils: Pupils are equal, round, and reactive to light. Cardiovascular:      Rate and Rhythm: Normal rate and regular rhythm. Heart sounds: Normal heart sounds. No murmur heard. No friction rub. No gallop. Pulmonary:      Effort: Pulmonary effort is normal.      Breath sounds: Normal breath sounds. Abdominal:      General: Abdomen is flat. Bowel sounds are normal.      Palpations: Abdomen is soft. Musculoskeletal:         General: Tenderness present. Normal range of motion. Comments: Palpation of right lower back/buttock   Skin:     General: Skin is warm and dry. Neurological:      General: No focal deficit present. Mental Status: She is alert and oriented to person, place, and time. Psychiatric:         Mood and Affect: Mood normal.         Behavior: Behavior normal.         Thought Content: Thought content normal.         Judgment: Judgment normal.        1. Women's annual routine gynecological examination      No abnormalities noted during vaginal exam.      No adnexal tenderness with bimanual exam.    2. Encounter for Papanicolaou smear for cervical cancer screening      PAP IGP,APTIMA HPV,CTNG AGE GDLN      PAP obtained during vaginal exam with minimal discomfort noted. Educated that light spotting is common with PAP. 3. Normal female breast exam       No abnormalities noted during breast exam.       Mammogram was ordered on 2/27/2023.  Contact for scheduling provided on 2/27/2023.    4. Essential hypertension        This is not at goal of < 140/90. Medications were refilled on 2/27/2023 after being out for 5-6 days. Patient states she did not take medications this morning due to fasting for labs. Educated patient to take BP medication and return for nurse visit in 1-2 hours. States that she is unable to return in 1-2 weeks due to work schedule, therefore will recheck today after medication administration. Take medication as directed. Do not miss doses. Contact provider if refills needed. Check BP at home and bring readings to next visit for review. 6 month follow up pending repeat BP is at goal during NV. Will need repeat colonoscopy 12/2023. LDLCT and Mammogram have been ordered. We discussed the expected course, resolution and complications of the diagnosis(es) in detail. Medication risks, benefits, costs, interactions, and alternatives were discussed as indicated. I advised her to contact the office if her condition worsens, changes or fails to improve as anticipated. She expressed understanding with the diagnosis(es) and plan.      Patient verbalizes understanding of plan of care as discussed above

## 2023-03-01 LAB
ALBUMIN SERPL-MCNC: 4.2 G/DL (ref 3.8–4.8)
ALBUMIN/GLOB SERPL: 1.4 {RATIO} (ref 1.2–2.2)
ALP SERPL-CCNC: 121 IU/L (ref 44–121)
ALT SERPL-CCNC: 13 IU/L (ref 0–32)
APPEARANCE UR: CLEAR
AST SERPL-CCNC: 14 IU/L (ref 0–40)
BACTERIA #/AREA URNS HPF: NORMAL /[HPF]
BASOPHILS # BLD AUTO: 0 X10E3/UL (ref 0–0.2)
BASOPHILS NFR BLD AUTO: 0 %
BILIRUB SERPL-MCNC: <0.2 MG/DL (ref 0–1.2)
BILIRUB UR QL STRIP: NEGATIVE
BUN SERPL-MCNC: 19 MG/DL (ref 8–27)
BUN/CREAT SERPL: 19 (ref 12–28)
CALCIUM SERPL-MCNC: 9.9 MG/DL (ref 8.7–10.3)
CASTS URNS QL MICRO: NORMAL /LPF
CHLORIDE SERPL-SCNC: 104 MMOL/L (ref 96–106)
CHOLEST SERPL-MCNC: 239 MG/DL (ref 100–199)
CO2 SERPL-SCNC: 21 MMOL/L (ref 20–29)
COLOR UR: YELLOW
CREAT SERPL-MCNC: 1.02 MG/DL (ref 0.57–1)
EGFRCR SERPLBLD CKD-EPI 2021: 63 ML/MIN/1.73
EOSINOPHIL # BLD AUTO: 0 X10E3/UL (ref 0–0.4)
EOSINOPHIL NFR BLD AUTO: 0 %
EPI CELLS #/AREA URNS HPF: NORMAL /HPF (ref 0–10)
ERYTHROCYTE [DISTWIDTH] IN BLOOD BY AUTOMATED COUNT: 14.2 % (ref 11.7–15.4)
GLOBULIN SER CALC-MCNC: 3 G/DL (ref 1.5–4.5)
GLUCOSE SERPL-MCNC: 97 MG/DL (ref 70–99)
GLUCOSE UR QL STRIP: NEGATIVE
HCT VFR BLD AUTO: 46.1 % (ref 34–46.6)
HDLC SERPL-MCNC: 68 MG/DL
HGB BLD-MCNC: 14.8 G/DL (ref 11.1–15.9)
HGB UR QL STRIP: ABNORMAL
IMM GRANULOCYTES # BLD AUTO: 0 X10E3/UL (ref 0–0.1)
IMM GRANULOCYTES NFR BLD AUTO: 0 %
KETONES UR QL STRIP: NEGATIVE
LDLC SERPL CALC-MCNC: 156 MG/DL (ref 0–99)
LEUKOCYTE ESTERASE UR QL STRIP: NEGATIVE
LYMPHOCYTES # BLD AUTO: 1.9 X10E3/UL (ref 0.7–3.1)
LYMPHOCYTES NFR BLD AUTO: 27 %
MCH RBC QN AUTO: 27.6 PG (ref 26.6–33)
MCHC RBC AUTO-ENTMCNC: 32.1 G/DL (ref 31.5–35.7)
MCV RBC AUTO: 86 FL (ref 79–97)
MICRO URNS: ABNORMAL
MONOCYTES # BLD AUTO: 0.6 X10E3/UL (ref 0.1–0.9)
MONOCYTES NFR BLD AUTO: 9 %
NEUTROPHILS # BLD AUTO: 4.5 X10E3/UL (ref 1.4–7)
NEUTROPHILS NFR BLD AUTO: 64 %
NITRITE UR QL STRIP: NEGATIVE
PH UR STRIP: 5.5 [PH] (ref 5–7.5)
PLATELET # BLD AUTO: 208 X10E3/UL (ref 150–450)
POTASSIUM SERPL-SCNC: 4 MMOL/L (ref 3.5–5.2)
PROT SERPL-MCNC: 7.2 G/DL (ref 6–8.5)
PROT UR QL STRIP: NEGATIVE
RBC # BLD AUTO: 5.36 X10E6/UL (ref 3.77–5.28)
RBC #/AREA URNS HPF: NORMAL /HPF (ref 0–2)
SODIUM SERPL-SCNC: 140 MMOL/L (ref 134–144)
SP GR UR STRIP: 1.03 (ref 1–1.03)
T4 FREE SERPL-MCNC: 1.24 NG/DL (ref 0.82–1.77)
TRIGL SERPL-MCNC: 86 MG/DL (ref 0–149)
TSH SERPL DL<=0.005 MIU/L-ACNC: 0.29 UIU/ML (ref 0.45–4.5)
UROBILINOGEN UR STRIP-MCNC: 0.2 MG/DL (ref 0.2–1)
VLDLC SERPL CALC-MCNC: 15 MG/DL (ref 5–40)
WBC # BLD AUTO: 7.1 X10E3/UL (ref 3.4–10.8)
WBC #/AREA URNS HPF: NORMAL /HPF (ref 0–5)

## 2023-03-01 RX ORDER — ATORVASTATIN CALCIUM 40 MG/1
40 TABLET, FILM COATED ORAL DAILY
Qty: 30 TABLET | Refills: 3 | Status: SHIPPED | OUTPATIENT
Start: 2023-03-01

## 2023-03-01 NOTE — PROGRESS NOTES
Please let patient know that her total and bad cholesterol are a bit higher than that of last year. I would like to increase her Atorvastatin to 40mg daily from prior 20mg daily at bedtime. She can take 2 tabs of her current prescription until completion of bottle. I will send the new prescription for 40mg tab to pharmacy now for next refill. Work on decreasing intake of fried fatty greasy foods. Increase regular exercise to 3-5 times per week in 30-40 min increments. Her TSH was low with labs, this could be transient, however I would like to repeat her thyroid labs. I will place order now. Ask that she arrive prior to 1000 on a day of her convenience. If persistently low, we will consider US of thyroid, however, again this could be transient. Otherwise, labs are good.

## 2023-03-01 NOTE — PROGRESS NOTES
Patient advised elevated cholestrol level, need to increase atorvastin to 40mg, will send in to pharmacy. May double what she has. Also to avoid greasy, fatty foods and exercise 3-5 times a week. Need to repeat thyriod level.

## 2023-03-01 NOTE — TELEPHONE ENCOUNTER
Patient advised to increase amlodipine to 10mg. New script called in and predisone called to [facility].

## 2023-03-02 LAB
T3 SERPL-MCNC: 121 NG/DL (ref 71–180)
TSH SERPL DL<=0.005 MIU/L-ACNC: 1.83 UIU/ML (ref 0.45–4.5)

## 2023-03-02 NOTE — PROGRESS NOTES
Please let patient know that her TSH and free T4 came back normal. Prior result appears to have been inaccurate.

## 2023-03-03 LAB
AGE GDLN ACOG TESTING, 191185: NORMAL
CYTOLOGIST CVX/VAG CYTO: NORMAL
CYTOLOGY CVX/VAG DOC CYTO: NORMAL
CYTOLOGY CVX/VAG DOC THIN PREP: NORMAL
DX ICD CODE: NORMAL
HPV GENOTYPE REFLEX: NORMAL
HPV I/H RISK 4 DNA CVX QL PROBE+SIG AMP: NEGATIVE
Lab: NORMAL
Lab: NORMAL
OTHER STN SPEC: NORMAL
STAT OF ADQ CVX/VAG CYTO-IMP: NORMAL

## 2023-03-08 ENCOUNTER — OFFICE VISIT (OUTPATIENT)
Dept: PRIMARY CARE CLINIC | Age: 63
End: 2023-03-08
Payer: OTHER GOVERNMENT

## 2023-03-08 ENCOUNTER — HOSPITAL ENCOUNTER (OUTPATIENT)
Dept: GENERAL RADIOLOGY | Age: 63
Discharge: HOME | End: 2023-03-08
Payer: OTHER GOVERNMENT

## 2023-03-08 VITALS
BODY MASS INDEX: 32.85 KG/M2 | OXYGEN SATURATION: 95 % | HEART RATE: 80 BPM | SYSTOLIC BLOOD PRESSURE: 117 MMHG | TEMPERATURE: 97.6 F | DIASTOLIC BLOOD PRESSURE: 79 MMHG | RESPIRATION RATE: 20 BRPM | WEIGHT: 192.4 LBS | HEIGHT: 64 IN

## 2023-03-08 DIAGNOSIS — M54.41 ACUTE RIGHT-SIDED LOW BACK PAIN WITH RIGHT-SIDED SCIATICA: Primary | ICD-10-CM

## 2023-03-08 DIAGNOSIS — M25.551 HIP PAIN, ACUTE, RIGHT: ICD-10-CM

## 2023-03-08 DIAGNOSIS — M54.41 ACUTE RIGHT-SIDED LOW BACK PAIN WITH RIGHT-SIDED SCIATICA: ICD-10-CM

## 2023-03-08 PROCEDURE — 73502 X-RAY EXAM HIP UNI 2-3 VIEWS: CPT

## 2023-03-08 PROCEDURE — 99213 OFFICE O/P EST LOW 20 MIN: CPT

## 2023-03-08 PROCEDURE — 72100 X-RAY EXAM L-S SPINE 2/3 VWS: CPT

## 2023-03-08 PROCEDURE — 3074F SYST BP LT 130 MM HG: CPT

## 2023-03-08 PROCEDURE — 3078F DIAST BP <80 MM HG: CPT

## 2023-03-08 PROCEDURE — 96372 THER/PROPH/DIAG INJ SC/IM: CPT

## 2023-03-08 RX ORDER — KETOROLAC TROMETHAMINE 30 MG/ML
30 INJECTION, SOLUTION INTRAMUSCULAR; INTRAVENOUS
Status: COMPLETED | OUTPATIENT
Start: 2023-03-08 | End: 2023-03-08

## 2023-03-08 RX ORDER — CYCLOBENZAPRINE HCL 10 MG
10 TABLET ORAL
Qty: 30 TABLET | Refills: 1 | Status: SHIPPED | OUTPATIENT
Start: 2023-03-08

## 2023-03-08 RX ADMIN — KETOROLAC TROMETHAMINE 30 MG: 30 INJECTION, SOLUTION INTRAMUSCULAR; INTRAVENOUS at 10:46

## 2023-03-08 NOTE — LETTER
NOTIFICATION RETURN TO WORK / SCHOOL    3/8/2023 10:33 AM    Ms. Nba Caba  [address] [place] [address]  [place] [id number]      To Whom It May Concern:    Nba Caba is currently under the care of [facility]. She will return to work/school on: 3/13/2023. If there are questions or concerns please have the patient contact our office.         Sincerely,      JULIANNE Martinez

## 2023-03-08 NOTE — PROGRESS NOTES
Chief Complaint   Patient presents with    Hip Pain     Follow up hip pain right      1. \"Have you been to the ER, urgent care clinic since your last visit? Hospitalized since your last visit? \" Yes Reason for visit: hip pain/[facility]     2. \"Have you seen or consulted any other health care providers outside of the [address] [place] [address] since your last visit? \" Yes Where: [facility]       3. For patients aged 39-70: Has the patient had a colonoscopy / FIT/ Cologuard? Yes - no Care Gap present      If the patient is female:    4. For patients aged 41-77: Has the patient had a mammogram within the past 2 years? Yes - no Care Gap present      5. For patients aged 21-65: Has the patient had a pap smear?  Yes - no Care Gap present  Visit Vitals  /79 (BP 1 Location: Right arm, BP Patient Position: Sitting, BP Cuff Size: Adult)   Pulse 80   Temp 97.6 °F (36.4 °C) (Oral)   Resp 20   Ht 5' 4\" (1.626 m)   Wt 192 lb 6.4 oz (87.3 kg)   SpO2 95%   BMI 33.03 kg/m²

## 2023-03-08 NOTE — PROGRESS NOTES
Basil San is a 65 y.o. female who presents to the office today for the following:    Chief Complaint   Patient presents with    Hip Pain     Follow up hip pain right        Past Medical History:   Diagnosis Date    Hypercholesterolemia     Hypertension 12/10/2020    Hypokalemia 12/10/2020    Menopause     Personal history of colonic polyps 11/17/2021       Past Surgical History:   Procedure Laterality Date    COLONOSCOPY N/A 12/3/2021    COLONOSCOPY (TIVA) performed by Tomasa Capps MD at [facility] ENDOSCOPY    HX HYSTERECTOMY      partial        Family History   Problem Relation Age of Onset    Hypertension Mother     Breast Cancer Maternal Grandmother     Breast Cancer Paternal Grandmother     Cancer Father         Social History     Tobacco Use    Smoking status: Every Day     Packs/day: 1.00     Types: Cigarettes    Smokeless tobacco: Never   Vaping Use    Vaping Use: Never used   Substance Use Topics    Alcohol use: Not Currently    Drug use: Never        HPI  Patient here for recurring right lower back pain radiating into right hip and down buttock since 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 Patient was seen on 2/27/2023 for this and administered 8mg Im decadron followed by 5 days of prednisone. Patient denies improvement since initial visit. Denies improvement with gentle stretches and massage. Pain is described as sharp, shooting. Patient tearful today during encounter. Appears uncomfortable. Requesting work release until improvement of pain. Current Outpatient Medications on File Prior to Visit   Medication Sig    atorvastatin (LIPITOR) 40 mg tablet Take 1 Tablet by mouth daily. amLODIPine (NORVASC) 10 mg tablet Take 1 Tablet by mouth daily. predniSONE (DELTASONE) 20 mg tablet Take 1 Tablet by mouth daily. lisinopril-hydroCHLOROthiazide (PRINZIDE, ZESTORETIC) 20-25 mg per tablet Take 1 Tablet by mouth daily.     colchicine 0.6 mg tablet Take 1 Tablet by mouth two (2) times a day.    ibuprofen (MOTRIN) 600 mg tablet Take 1 Tablet by mouth every eight (8) hours as needed for Pain (Take 3 times daily for the first 2 days then as needed). No current facility-administered medications on file prior to visit. Medications Ordered Today   Medications    cyclobenzaprine (FLEXERIL) 10 mg tablet     Sig: Take 1 Tablet by mouth nightly. Dispense:  30 Tablet     Refill:  1    ketorolac (TORADOL) injection 30 mg        Review of Systems   Constitutional:  Negative for chills, fever and malaise/fatigue. Respiratory:  Negative for cough. Cardiovascular:  Negative for chest pain, palpitations and leg swelling. Gastrointestinal:  Negative for abdominal pain, blood in stool, constipation, diarrhea, heartburn, melena, nausea and vomiting. Genitourinary:  Negative for dysuria, flank pain, frequency, hematuria and urgency. Musculoskeletal:  Positive for back pain, joint pain and myalgias. Right lower back radiating into right hip and buttock   Neurological:  Negative for dizziness, tingling, sensory change, speech change and headaches. Psychiatric/Behavioral:  Negative for depression, hallucinations, memory loss, substance abuse and suicidal ideas. The patient is not nervous/anxious and does not have insomnia. Visit Vitals  /79 (BP 1 Location: Right arm, BP Patient Position: Sitting, BP Cuff Size: Adult)   Pulse 80   Temp 97.6 °F (36.4 °C) (Oral)   Resp 20   Ht 5' 4\" (1.626 m)   Wt 192 lb 6.4 oz (87.3 kg)   SpO2 95%   BMI 33.03 kg/m²       Last Point of Care HGB A1C  No results found for: [id number]      Physical Exam  Constitutional:       Appearance: Normal appearance. She is normal weight. Comments: Tearful, appears uncomfortable, unable to sit erect in chair due to pain   Cardiovascular:      Rate and Rhythm: Normal rate and regular rhythm. Heart sounds: Normal heart sounds. No murmur heard. No friction rub. No gallop.    Pulmonary:      Effort: Pulmonary effort is normal.      Breath sounds: Normal breath sounds. Abdominal:      General: Abdomen is flat. Bowel sounds are normal.      Palpations: Abdomen is soft. Musculoskeletal:         General: Tenderness present. Comments: Tenderness with palpation of right lower Paraspinous muscles and right hip/buttock. Patient jumped with palpation of right hip. Ambulates without difficulty however does endorse discomfort during visit. Skin:     General: Skin is warm and dry. Neurological:      General: No focal deficit present. Mental Status: She is alert and oriented to person, place, and time. Psychiatric:         Mood and Affect: Mood normal.         Behavior: Behavior normal.         Thought Content: Thought content normal.         Judgment: Judgment normal.        1. Acute right-sided low back pain with right-sided sciatica  -     REFERRAL TO PHYSICAL THERAPY  -     cyclobenzaprine (FLEXERIL) 10 mg tablet; Take 1 Tablet by mouth nightly., Normal, Disp-30 Tablet, R-1  -     XR SPINE LUMB 2 OR 3 V; Future  Will send for XR now. Flexeril prn muscle spasm. Take as directed. Do not take this medication and drive or operate machinery as it may cause drowsiness and impairment. Gentle stretches, massage and heat therapy. Will refer to PT now. [facility] for same and prescribed 5 additional days of prednisone. States that she started this on 3/7/2023 in evening. Take as directed. Continue ibuprofen prn pain. Do not take on empty stomach as it can cause GI upset. If no improvement with PT and muscle relaxants, will refer to Ortho. Contact provider if new or worsening of symptoms. 2. Hip pain, acute, right  -     XR HIP RT W OR WO PELV 2-3 VWS; Future  -     ketorolac (TORADOL) injection 30 mg; 30 mg, IntraMUSCular, NOW, 1 dose, On Wed 3/8/23 at 1100  Will send for XR now.   Patient tearful and appears uncomfortable during encounter. Decadron not administered this visit due to current use of Prednisone. Will administer 60mg IM Toradol for pain now. Do not take this medication and drive or operate machinery as it may cause drowsiness and impairment. Work release provided at close of visit. We discussed the expected course, resolution and complications of the diagnosis(es) in detail. Medication risks, benefits, costs, interactions, and alternatives were discussed as indicated. I advised her to contact the office if her condition worsens, changes or fails to improve as anticipated. She expressed understanding with the diagnosis(es) and plan.      Patient verbalizes understanding of plan of care as discussed above

## 2023-03-09 NOTE — PROGRESS NOTES
Patient advised xray of hip normal, back shows DDD. If no relieve after 1-2 weeks of PT and predisone, will refer to ortho. Patient states feels better today, after injection yesterday.

## 2023-03-09 NOTE — PROGRESS NOTES
Please let patient know that there were no acute changes on her xr of spine, however there is evidence of degenerative disc disease. Please have her let us know if no improvement of pain with prednisone and physical therapy in 1-2 weeks.  Will refer to spine specialist.

## 2023-05-18 RX ORDER — IBUPROFEN 600 MG/1
600 TABLET ORAL EVERY 8 HOURS PRN
COMMUNITY
Start: 2022-09-28

## 2023-05-18 RX ORDER — LISINOPRIL AND HYDROCHLOROTHIAZIDE 25; 20 MG/1; MG/1
1 TABLET ORAL DAILY
COMMUNITY
Start: 2023-02-27

## 2023-05-18 RX ORDER — COLCHICINE 0.6 MG/1
0.6 TABLET ORAL 2 TIMES DAILY
COMMUNITY
Start: 2022-09-28

## 2023-05-18 RX ORDER — AMLODIPINE BESYLATE 10 MG/1
10 TABLET ORAL DAILY
COMMUNITY
Start: 2023-02-28

## 2023-05-18 RX ORDER — PREDNISONE 20 MG/1
20 TABLET ORAL DAILY
COMMUNITY
Start: 2023-02-28

## 2023-05-18 RX ORDER — CYCLOBENZAPRINE HCL 10 MG
1 TABLET ORAL NIGHTLY
COMMUNITY
Start: 2023-03-08

## 2023-05-18 RX ORDER — ATORVASTATIN CALCIUM 40 MG/1
40 TABLET, FILM COATED ORAL DAILY
COMMUNITY
Start: 2023-03-01

## 2023-08-21 ENCOUNTER — OFFICE VISIT (OUTPATIENT)
Facility: CLINIC | Age: 63
End: 2023-08-21
Payer: OTHER GOVERNMENT

## 2023-08-21 VITALS
BODY MASS INDEX: 33.87 KG/M2 | WEIGHT: 198.4 LBS | RESPIRATION RATE: 18 BRPM | SYSTOLIC BLOOD PRESSURE: 146 MMHG | HEART RATE: 69 BPM | HEIGHT: 64 IN | OXYGEN SATURATION: 95 % | DIASTOLIC BLOOD PRESSURE: 92 MMHG | TEMPERATURE: 98.7 F

## 2023-08-21 DIAGNOSIS — J06.9 URI WITH COUGH AND CONGESTION: Primary | ICD-10-CM

## 2023-08-21 DIAGNOSIS — F17.200 TOBACCO DEPENDENCE: ICD-10-CM

## 2023-08-21 DIAGNOSIS — M1A.9XX0 CHRONIC GOUT WITHOUT TOPHUS, UNSPECIFIED CAUSE, UNSPECIFIED SITE: ICD-10-CM

## 2023-08-21 DIAGNOSIS — I10 PRIMARY HYPERTENSION: ICD-10-CM

## 2023-08-21 DIAGNOSIS — E78.2 MIXED HYPERLIPIDEMIA: ICD-10-CM

## 2023-08-21 PROBLEM — E87.6 HYPOKALEMIA: Status: RESOLVED | Noted: 2020-12-10 | Resolved: 2023-08-21

## 2023-08-21 PROCEDURE — 3077F SYST BP >= 140 MM HG: CPT

## 2023-08-21 PROCEDURE — 3080F DIAST BP >= 90 MM HG: CPT

## 2023-08-21 PROCEDURE — 99214 OFFICE O/P EST MOD 30 MIN: CPT

## 2023-08-21 RX ORDER — PREDNISONE 20 MG/1
TABLET ORAL
Qty: 9 TABLET | Refills: 0 | Status: SHIPPED | OUTPATIENT
Start: 2023-08-21

## 2023-08-21 RX ORDER — LISINOPRIL AND HYDROCHLOROTHIAZIDE 25; 20 MG/1; MG/1
1 TABLET ORAL DAILY
Qty: 90 TABLET | Refills: 0 | Status: SHIPPED | OUTPATIENT
Start: 2023-08-21

## 2023-08-21 RX ORDER — DOXYCYCLINE HYCLATE 100 MG
100 TABLET ORAL 2 TIMES DAILY
Qty: 14 TABLET | Refills: 0 | Status: SHIPPED | OUTPATIENT
Start: 2023-08-21 | End: 2023-08-28

## 2023-08-21 RX ORDER — AMLODIPINE BESYLATE 10 MG/1
10 TABLET ORAL DAILY
Qty: 90 TABLET | Refills: 0 | Status: SHIPPED | OUTPATIENT
Start: 2023-08-21

## 2023-08-21 RX ORDER — ALBUTEROL SULFATE 90 UG/1
2 AEROSOL, METERED RESPIRATORY (INHALATION) 4 TIMES DAILY PRN
Qty: 18 G | Refills: 0 | Status: SHIPPED | OUTPATIENT
Start: 2023-08-21

## 2023-08-21 RX ORDER — ATORVASTATIN CALCIUM 40 MG/1
40 TABLET, FILM COATED ORAL DAILY
Qty: 90 TABLET | Refills: 0 | Status: SHIPPED | OUTPATIENT
Start: 2023-08-21

## 2023-08-21 RX ORDER — COLCHICINE 0.6 MG/1
0.6 TABLET ORAL 2 TIMES DAILY
Qty: 30 TABLET | Refills: 1 | Status: SHIPPED | OUTPATIENT
Start: 2023-08-21

## 2023-08-21 ASSESSMENT — PATIENT HEALTH QUESTIONNAIRE - PHQ9
SUM OF ALL RESPONSES TO PHQ QUESTIONS 1-9: 0
SUM OF ALL RESPONSES TO PHQ9 QUESTIONS 1 & 2: 0
SUM OF ALL RESPONSES TO PHQ QUESTIONS 1-9: 0
1. LITTLE INTEREST OR PLEASURE IN DOING THINGS: 0
SUM OF ALL RESPONSES TO PHQ QUESTIONS 1-9: 0
SUM OF ALL RESPONSES TO PHQ QUESTIONS 1-9: 0
2. FEELING DOWN, DEPRESSED OR HOPELESS: 0

## 2023-08-21 ASSESSMENT — ENCOUNTER SYMPTOMS
CHOKING: 0
APNEA: 0
TROUBLE SWALLOWING: 0
RHINORRHEA: 1
STRIDOR: 0
COUGH: 1
SINUS PRESSURE: 1
SINUS PAIN: 0
WHEEZING: 1
GASTROINTESTINAL NEGATIVE: 1
CHEST TIGHTNESS: 1
SORE THROAT: 0
EYES NEGATIVE: 1

## 2023-08-21 NOTE — PROGRESS NOTES
Ellis Jones is a 65 y.o. female who presents to the office today for the following:    Chief Complaint   Patient presents with    Pharyngitis    Cough     C/o sore throat, runny nose, cough, congestion. Been out of work since Thursday        Past Medical History:   Diagnosis Date    Hypercholesterolemia     Hypertension 12/10/2020    Hypokalemia 12/10/2020    Menopause     Personal history of colonic polyps 11/17/2021        Past Surgical History:   Procedure Laterality Date    COLONOSCOPY N/A 12/3/2021    COLONOSCOPY (TIVA) performed by Ludmila Parikh MD at [facility] ENDOSCOPY    HYSTERECTOMY (CERVIX STATUS UNKNOWN)      partial        Family History   Problem Relation Age of Onset    Cancer Father     Breast Cancer Paternal Grandmother     Breast Cancer Maternal Grandmother     Hypertension Mother         Social History     Tobacco Use    Smoking status: Every Day     Packs/day: 1.00     Types: Cigarettes    Smokeless tobacco: Never   Substance Use Topics    Alcohol use: Not Currently    Drug use: Never        HPI  Patient here for nonproductive cough, congestion and wheezing for 9 days that has been worse for 4 days with PMH tobacco dependence, HTN, obesity, gout and hyperlipidemia. Patient is having some intermittent wheezing and chest tightness with cough. Denies dizziness, palpitations or near syncope. Denies fever, body aches or chills. Does endorses generalized weakness. Denies N/V/D or abdominal pain. Chief Complaint   Patient presents with    Pharyngitis    Cough     C/o sore throat, runny nose, cough, congestion. Been out of work since Thursday        Current Outpatient Medications on File Prior to Visit   Medication Sig Dispense Refill    cyclobenzaprine (FLEXERIL) 10 MG tablet Take 1 tablet by mouth nightly       No current facility-administered medications on file prior to visit.         Current Outpatient Medications   Medication Sig Dispense Refill    cyclobenzaprine (FLEXERIL) 10 MG tablet

## 2023-08-28 ENCOUNTER — OFFICE VISIT (OUTPATIENT)
Facility: CLINIC | Age: 63
End: 2023-08-28
Payer: OTHER GOVERNMENT

## 2023-08-28 VITALS
TEMPERATURE: 97.1 F | RESPIRATION RATE: 20 BRPM | WEIGHT: 196 LBS | HEART RATE: 72 BPM | SYSTOLIC BLOOD PRESSURE: 106 MMHG | DIASTOLIC BLOOD PRESSURE: 71 MMHG | OXYGEN SATURATION: 97 % | HEIGHT: 64 IN | BODY MASS INDEX: 33.46 KG/M2

## 2023-08-28 DIAGNOSIS — I10 PRIMARY HYPERTENSION: ICD-10-CM

## 2023-08-28 DIAGNOSIS — J20.9 ACUTE BRONCHITIS, UNSPECIFIED ORGANISM: ICD-10-CM

## 2023-08-28 DIAGNOSIS — Z13.1 SCREENING FOR DIABETES MELLITUS: ICD-10-CM

## 2023-08-28 DIAGNOSIS — M1A.9XX0 CHRONIC GOUT WITHOUT TOPHUS, UNSPECIFIED CAUSE, UNSPECIFIED SITE: ICD-10-CM

## 2023-08-28 DIAGNOSIS — F17.200 TOBACCO DEPENDENCE: ICD-10-CM

## 2023-08-28 DIAGNOSIS — E78.2 MIXED HYPERLIPIDEMIA: ICD-10-CM

## 2023-08-28 LAB
BASOPHILS # BLD AUTO: 0.1 X10E3/UL (ref 0–0.2)
BASOPHILS NFR BLD AUTO: 1 %
EOSINOPHIL # BLD AUTO: 0.2 X10E3/UL (ref 0–0.4)
EOSINOPHIL NFR BLD AUTO: 3 %
ERYTHROCYTE [DISTWIDTH] IN BLOOD BY AUTOMATED COUNT: 13.5 % (ref 11.7–15.4)
HCT VFR BLD AUTO: 48.7 % (ref 34–46.6)
HGB BLD-MCNC: 16.2 G/DL (ref 11.1–15.9)
IMM GRANULOCYTES # BLD AUTO: 0 X10E3/UL (ref 0–0.1)
IMM GRANULOCYTES NFR BLD AUTO: 0 %
LYMPHOCYTES # BLD AUTO: 3.7 X10E3/UL (ref 0.7–3.1)
LYMPHOCYTES NFR BLD AUTO: 57 %
MCH RBC QN AUTO: 28.1 PG (ref 26.6–33)
MCHC RBC AUTO-ENTMCNC: 33.3 G/DL (ref 31.5–35.7)
MCV RBC AUTO: 84 FL (ref 79–97)
MONOCYTES # BLD AUTO: 0.7 X10E3/UL (ref 0.1–0.9)
MONOCYTES NFR BLD AUTO: 10 %
NEUTROPHILS # BLD AUTO: 1.9 X10E3/UL (ref 1.4–7)
NEUTROPHILS NFR BLD AUTO: 29 %
PLATELET # BLD AUTO: 259 X10E3/UL (ref 150–450)
RBC # BLD AUTO: 5.77 X10E6/UL (ref 3.77–5.28)
WBC # BLD AUTO: 6.5 X10E3/UL (ref 3.4–10.8)

## 2023-08-28 PROCEDURE — 3078F DIAST BP <80 MM HG: CPT | Performed by: NURSE PRACTITIONER

## 2023-08-28 PROCEDURE — 99214 OFFICE O/P EST MOD 30 MIN: CPT | Performed by: NURSE PRACTITIONER

## 2023-08-28 PROCEDURE — 3074F SYST BP LT 130 MM HG: CPT | Performed by: NURSE PRACTITIONER

## 2023-08-28 RX ORDER — BENZONATATE 200 MG/1
200 CAPSULE ORAL 3 TIMES DAILY PRN
Qty: 21 CAPSULE | Refills: 0 | Status: SHIPPED | OUTPATIENT
Start: 2023-08-28 | End: 2023-09-04

## 2023-08-28 RX ORDER — AMLODIPINE BESYLATE 10 MG/1
10 TABLET ORAL DAILY
Qty: 90 TABLET | Refills: 0 | Status: SHIPPED | OUTPATIENT
Start: 2023-08-28

## 2023-08-28 RX ORDER — LISINOPRIL AND HYDROCHLOROTHIAZIDE 25; 20 MG/1; MG/1
1 TABLET ORAL DAILY
Qty: 90 TABLET | Refills: 0 | Status: SHIPPED | OUTPATIENT
Start: 2023-08-28

## 2023-08-28 RX ORDER — PREDNISONE 20 MG/1
TABLET ORAL
Qty: 9 TABLET | Refills: 0 | Status: SHIPPED | OUTPATIENT
Start: 2023-08-28 | End: 2023-09-03

## 2023-08-28 RX ORDER — COLCHICINE 0.6 MG/1
0.6 TABLET ORAL DAILY
Qty: 30 TABLET | Refills: 1 | Status: SHIPPED | OUTPATIENT
Start: 2023-08-28

## 2023-08-28 RX ORDER — ALBUTEROL SULFATE 90 UG/1
2 AEROSOL, METERED RESPIRATORY (INHALATION) 4 TIMES DAILY PRN
Qty: 54 G | Refills: 1 | Status: SHIPPED | OUTPATIENT
Start: 2023-08-28

## 2023-08-28 RX ORDER — COLCHICINE 0.6 MG/1
0.6 TABLET ORAL 2 TIMES DAILY
Qty: 90 TABLET | Refills: 0 | Status: SHIPPED | OUTPATIENT
Start: 2023-08-28 | End: 2023-08-28

## 2023-08-28 RX ORDER — ALBUTEROL SULFATE 90 UG/1
2 AEROSOL, METERED RESPIRATORY (INHALATION) 4 TIMES DAILY PRN
Qty: 18 G | Refills: 0 | Status: CANCELLED | OUTPATIENT
Start: 2023-08-28

## 2023-08-28 RX ORDER — ATORVASTATIN CALCIUM 40 MG/1
40 TABLET, FILM COATED ORAL DAILY
Qty: 90 TABLET | Refills: 0 | Status: SHIPPED | OUTPATIENT
Start: 2023-08-28

## 2023-08-28 ASSESSMENT — ENCOUNTER SYMPTOMS
EYE PAIN: 0
WHEEZING: 1
EYE DISCHARGE: 0
BACK PAIN: 0
ABDOMINAL DISTENTION: 0
BLOOD IN STOOL: 0
COLOR CHANGE: 0
APNEA: 0
EYE ITCHING: 0
STRIDOR: 0
NAUSEA: 0
PHOTOPHOBIA: 0
SINUS PAIN: 0
DIARRHEA: 0
SORE THROAT: 0
EYE REDNESS: 0
CONSTIPATION: 0
VOMITING: 0
TROUBLE SWALLOWING: 0
ABDOMINAL PAIN: 0
COUGH: 1
FACIAL SWELLING: 0
CHOKING: 0

## 2023-08-28 ASSESSMENT — PATIENT HEALTH QUESTIONNAIRE - PHQ9
SUM OF ALL RESPONSES TO PHQ QUESTIONS 1-9: 0
2. FEELING DOWN, DEPRESSED OR HOPELESS: 0
SUM OF ALL RESPONSES TO PHQ QUESTIONS 1-9: 0
SUM OF ALL RESPONSES TO PHQ QUESTIONS 1-9: 0
1. LITTLE INTEREST OR PLEASURE IN DOING THINGS: 0
SUM OF ALL RESPONSES TO PHQ9 QUESTIONS 1 & 2: 0
SUM OF ALL RESPONSES TO PHQ QUESTIONS 1-9: 0

## 2023-08-28 NOTE — PROGRESS NOTES
getting regular exercise 3-5 times weekly for 30-45 minutes consistently. 4. Tobacco dependence  Continue to encourage smoking cessation and have counseled on dangers of continued use which can be life threatening. 5. Chronic gout without tophus, unspecified cause, unspecified site  Lab Results   Component Value Date    LABURIC 6.4 (H) 09/28/2022    Currently uses colchicine for acute flare ups. None in past 3 mo. - colchicine (COLCRYS) 0.6 MG tablet; Take 1 tablet by mouth daily  Dispense: 30 tablet; Refill: 1    6. Screening for diabetes mellitus  Check screening A1c  - Hemoglobin A1C    7. Acute bronchitis, unspecified organism  Recent tx by ROSALINA Rios NP on 8/21/23 for URI  Some improvement but still congested cough and wheezing  Will continue prednisone taper for another week as finished prior taper of 5 days  She is finishing doxycycline and will continue  Refill albuterol inhaler to use prn for wheezing  Continue supportive care with OTC mucinex  to help clear congestion. Also encourage rest, increased oral fluids, cool mist humidifier and Tylenol prn. If not improving over next 2-3 days, follow up with provider or immediately if develops an worsening symptoms such as fever or shortness of breath. - predniSONE (DELTASONE) 20 MG tablet; Take 2 tablets by mouth daily for 3 days, THEN 1 tablet daily for 3 days. Dispense: 9 tablet; Refill: 0  - albuterol sulfate HFA (VENTOLIN HFA) 108 (90 Base) MCG/ACT inhaler; Inhale 2 puffs into the lungs 4 times daily as needed for Wheezing  Dispense: 54 g; Refill: 1      Patient verbalizes understanding of plan of care as discussed above    Return in about 3 months (around 11/28/2023), or if symptoms worsen or fail to improve.

## 2023-08-29 LAB
ALBUMIN SERPL-MCNC: 4.2 G/DL (ref 3.9–4.9)
ALBUMIN/GLOB SERPL: 1.6 {RATIO} (ref 1.2–2.2)
ALP SERPL-CCNC: 117 IU/L (ref 44–121)
ALT SERPL-CCNC: 12 IU/L (ref 0–32)
AST SERPL-CCNC: 14 IU/L (ref 0–40)
BILIRUB SERPL-MCNC: 0.3 MG/DL (ref 0–1.2)
BUN SERPL-MCNC: 18 MG/DL (ref 8–27)
BUN/CREAT SERPL: 16 (ref 12–28)
CALCIUM SERPL-MCNC: 10 MG/DL (ref 8.7–10.3)
CHLORIDE SERPL-SCNC: 103 MMOL/L (ref 96–106)
CHOLEST SERPL-MCNC: 174 MG/DL (ref 100–199)
CO2 SERPL-SCNC: 23 MMOL/L (ref 20–29)
CREAT SERPL-MCNC: 1.13 MG/DL (ref 0.57–1)
EGFRCR SERPLBLD CKD-EPI 2021: 55 ML/MIN/1.73
GLOBULIN SER CALC-MCNC: 2.7 G/DL (ref 1.5–4.5)
GLUCOSE SERPL-MCNC: 73 MG/DL (ref 70–99)
HBA1C MFR BLD: 5.9 % (ref 4.8–5.6)
HDLC SERPL-MCNC: 68 MG/DL
LDLC SERPL CALC-MCNC: 86 MG/DL (ref 0–99)
POTASSIUM SERPL-SCNC: 3.9 MMOL/L (ref 3.5–5.2)
PROT SERPL-MCNC: 6.9 G/DL (ref 6–8.5)
SODIUM SERPL-SCNC: 143 MMOL/L (ref 134–144)
TRIGL SERPL-MCNC: 113 MG/DL (ref 0–149)
TSH SERPL DL<=0.005 MIU/L-ACNC: 1.54 UIU/ML (ref 0.45–4.5)
VLDLC SERPL CALC-MCNC: 20 MG/DL (ref 5–40)

## 2023-10-15 ENCOUNTER — APPOINTMENT (OUTPATIENT)
Facility: HOSPITAL | Age: 63
End: 2023-10-15
Attending: EMERGENCY MEDICINE
Payer: OTHER GOVERNMENT

## 2023-10-15 ENCOUNTER — HOSPITAL ENCOUNTER (EMERGENCY)
Facility: HOSPITAL | Age: 63
Discharge: HOME | End: 2023-10-15
Attending: EMERGENCY MEDICINE
Payer: OTHER GOVERNMENT

## 2023-10-15 VITALS
SYSTOLIC BLOOD PRESSURE: 144 MMHG | RESPIRATION RATE: 20 BRPM | WEIGHT: 208 LBS | TEMPERATURE: 97.8 F | HEIGHT: 64 IN | BODY MASS INDEX: 35.51 KG/M2 | OXYGEN SATURATION: 99 % | HEART RATE: 70 BPM | DIASTOLIC BLOOD PRESSURE: 92 MMHG

## 2023-10-15 DIAGNOSIS — U07.1 COVID: ICD-10-CM

## 2023-10-15 DIAGNOSIS — J44.1 COPD EXACERBATION (HCC): Primary | ICD-10-CM

## 2023-10-15 LAB
ALBUMIN SERPL-MCNC: 3.5 G/DL (ref 3.5–5)
ALBUMIN/GLOB SERPL: 0.9 (ref 1.1–2.2)
ALP SERPL-CCNC: 99 U/L (ref 45–117)
ALT SERPL-CCNC: 20 U/L (ref 12–78)
ANION GAP SERPL CALC-SCNC: 13 MMOL/L (ref 5–15)
AST SERPL W P-5'-P-CCNC: 17 U/L (ref 15–37)
BASOPHILS # BLD: 0 K/UL (ref 0–0.2)
BASOPHILS NFR BLD: 1 % (ref 0–2.5)
BILIRUB SERPL-MCNC: 0.4 MG/DL (ref 0.2–1)
BNP SERPL-MCNC: 1322 PG/ML
BUN SERPL-MCNC: 10 MG/DL (ref 6–20)
BUN/CREAT SERPL: 9 (ref 12–20)
CA-I BLD-MCNC: 9.2 MG/DL (ref 8.5–10.1)
CHLORIDE SERPL-SCNC: 105 MMOL/L (ref 97–108)
CO2 SERPL-SCNC: 23 MMOL/L (ref 21–32)
CREAT SERPL-MCNC: 1.09 MG/DL (ref 0.55–1.02)
EOSINOPHIL # BLD: 0.2 K/UL (ref 0–0.7)
EOSINOPHIL NFR BLD: 5 % (ref 0.9–2.9)
ERYTHROCYTE [DISTWIDTH] IN BLOOD BY AUTOMATED COUNT: 15.4 % (ref 11.5–14.5)
FLUAV RNA SPEC QL NAA+PROBE: NOT DETECTED
FLUBV RNA SPEC QL NAA+PROBE: NOT DETECTED
GLOBULIN SER CALC-MCNC: 3.8 G/DL (ref 2–4)
GLUCOSE SERPL-MCNC: 126 MG/DL (ref 65–100)
HCT VFR BLD AUTO: 43.3 % (ref 36–46)
HGB BLD-MCNC: 14.4 G/DL (ref 13.5–17.5)
LYMPHOCYTES # BLD: 2.2 K/UL (ref 1–4.8)
LYMPHOCYTES NFR BLD: 58 % (ref 20.5–51.1)
MAGNESIUM SERPL-MCNC: 1.9 MG/DL (ref 1.6–2.4)
MCH RBC QN AUTO: 28.1 PG (ref 31–34)
MCHC RBC AUTO-ENTMCNC: 33.4 G/DL (ref 31–36)
MCV RBC AUTO: 84.2 FL (ref 80–100)
MONOCYTES # BLD: 0.3 K/UL (ref 0.2–2.4)
MONOCYTES NFR BLD: 7 % (ref 1.7–9.3)
NEUTS SEG # BLD: 1.1 K/UL (ref 1.8–7.7)
NEUTS SEG NFR BLD: 29 % (ref 42–75)
NRBC # BLD: 0.01 K/UL
NRBC BLD-RTO: 0.2 PER 100 WBC
PLATELET # BLD AUTO: 158 K/UL (ref 150–400)
PMV BLD AUTO: 8.8 FL (ref 6.5–11.5)
POTASSIUM SERPL-SCNC: 2.8 MMOL/L (ref 3.5–5.1)
PROT SERPL-MCNC: 7.3 G/DL (ref 6.4–8.2)
RBC # BLD AUTO: 5.14 M/UL (ref 4.5–5.9)
SARS-COV-2 RNA RESP QL NAA+PROBE: DETECTED
SODIUM SERPL-SCNC: 141 MMOL/L (ref 136–145)
TROPONIN I SERPL HS-MCNC: 21 NG/L (ref 0–51)
WBC # BLD AUTO: 3.8 K/UL (ref 4.4–11.3)

## 2023-10-15 PROCEDURE — 80053 COMPREHEN METABOLIC PANEL: CPT

## 2023-10-15 PROCEDURE — 87636 SARSCOV2 & INF A&B AMP PRB: CPT

## 2023-10-15 PROCEDURE — 83735 ASSAY OF MAGNESIUM: CPT

## 2023-10-15 PROCEDURE — 6370000000 HC RX 637 (ALT 250 FOR IP): Performed by: EMERGENCY MEDICINE

## 2023-10-15 PROCEDURE — 84484 ASSAY OF TROPONIN QUANT: CPT

## 2023-10-15 PROCEDURE — 71045 X-RAY EXAM CHEST 1 VIEW: CPT

## 2023-10-15 PROCEDURE — 94762 N-INVAS EAR/PLS OXIMTRY CONT: CPT

## 2023-10-15 PROCEDURE — 2580000003 HC RX 258: Performed by: EMERGENCY MEDICINE

## 2023-10-15 PROCEDURE — 6360000002 HC RX W HCPCS: Performed by: EMERGENCY MEDICINE

## 2023-10-15 PROCEDURE — 94640 AIRWAY INHALATION TREATMENT: CPT

## 2023-10-15 PROCEDURE — 85025 COMPLETE CBC W/AUTO DIFF WBC: CPT

## 2023-10-15 PROCEDURE — 96374 THER/PROPH/DIAG INJ IV PUSH: CPT

## 2023-10-15 PROCEDURE — 83880 ASSAY OF NATRIURETIC PEPTIDE: CPT

## 2023-10-15 PROCEDURE — 93005 ELECTROCARDIOGRAM TRACING: CPT | Performed by: EMERGENCY MEDICINE

## 2023-10-15 PROCEDURE — 99285 EMERGENCY DEPT VISIT HI MDM: CPT

## 2023-10-15 RX ORDER — ALBUTEROL SULFATE 90 UG/1
AEROSOL, METERED RESPIRATORY (INHALATION)
Status: DISCONTINUED
Start: 2023-10-15 | End: 2023-10-15 | Stop reason: WASHOUT

## 2023-10-15 RX ORDER — NIFEDIPINE 10 MG/1
10 CAPSULE ORAL
Status: DISCONTINUED | OUTPATIENT
Start: 2023-10-15 | End: 2023-10-15 | Stop reason: HOSPADM

## 2023-10-15 RX ORDER — ALBUTEROL SULFATE 90 UG/1
2 AEROSOL, METERED RESPIRATORY (INHALATION) EVERY 6 HOURS PRN
Status: DISCONTINUED | OUTPATIENT
Start: 2023-10-15 | End: 2023-10-15 | Stop reason: HOSPADM

## 2023-10-15 RX ORDER — IPRATROPIUM BROMIDE AND ALBUTEROL SULFATE 2.5; .5 MG/3ML; MG/3ML
1 SOLUTION RESPIRATORY (INHALATION)
Status: COMPLETED | OUTPATIENT
Start: 2023-10-15 | End: 2023-10-15

## 2023-10-15 RX ORDER — POTASSIUM CHLORIDE 20 MEQ/1
40 TABLET, EXTENDED RELEASE ORAL ONCE
Status: COMPLETED | OUTPATIENT
Start: 2023-10-15 | End: 2023-10-15

## 2023-10-15 RX ORDER — HYDRALAZINE HYDROCHLORIDE 50 MG/1
50 TABLET, FILM COATED ORAL
Status: COMPLETED | OUTPATIENT
Start: 2023-10-15 | End: 2023-10-15

## 2023-10-15 RX ORDER — FUROSEMIDE 40 MG/1
40 TABLET ORAL
Status: COMPLETED | OUTPATIENT
Start: 2023-10-15 | End: 2023-10-15

## 2023-10-15 RX ORDER — LISINOPRIL 20 MG/1
20 TABLET ORAL
Status: COMPLETED | OUTPATIENT
Start: 2023-10-15 | End: 2023-10-15

## 2023-10-15 RX ADMIN — HYDRALAZINE HYDROCHLORIDE 50 MG: 50 TABLET, FILM COATED ORAL at 16:55

## 2023-10-15 RX ADMIN — LISINOPRIL 20 MG: 20 TABLET ORAL at 17:51

## 2023-10-15 RX ADMIN — Medication 2 PUFF: at 18:28

## 2023-10-15 RX ADMIN — POTASSIUM CHLORIDE 40 MEQ: 1500 TABLET, EXTENDED RELEASE ORAL at 17:51

## 2023-10-15 RX ADMIN — IPRATROPIUM BROMIDE AND ALBUTEROL SULFATE 1 DOSE: .5; 3 SOLUTION RESPIRATORY (INHALATION) at 16:38

## 2023-10-15 RX ADMIN — WATER 125 MG: 1 INJECTION INTRAMUSCULAR; INTRAVENOUS; SUBCUTANEOUS at 16:39

## 2023-10-15 RX ADMIN — FUROSEMIDE 40 MG: 40 TABLET ORAL at 17:51

## 2023-10-15 ASSESSMENT — PAIN SCALES - GENERAL: PAINLEVEL_OUTOF10: 0

## 2023-10-15 ASSESSMENT — PAIN - FUNCTIONAL ASSESSMENT: PAIN_FUNCTIONAL_ASSESSMENT: 0-10

## 2023-10-15 ASSESSMENT — ENCOUNTER SYMPTOMS
ALLERGIC/IMMUNOLOGIC NEGATIVE: 1
EYES NEGATIVE: 1
WHEEZING: 1
SHORTNESS OF BREATH: 1
GASTROINTESTINAL NEGATIVE: 1
COUGH: 1

## 2023-10-15 NOTE — ED NOTES
MD states to hold Nifedipine     Marylee Macho, RN  10/15/23 4307 Patient aware of prework date/time and OR date/arrival time 0500/npo/no meds. Pt aware to take last dose of Plavix on 5/4 and to  Bactroban for use at 1700 on 5/9. Patient uses [facility] in [place].

## 2023-10-15 NOTE — ED NOTES
Repeat BP at time of  additional med administration had dropped to 144/92- MD aware .  Nifedipine held     Serg Tobias RN  10/15/23 4225

## 2023-10-15 NOTE — ED TRIAGE NOTES
Pt reports cough and SOB x 3 days. Pt reports similar episode a month ago. PT reports she believes she was dx with COPD. Pt noted to have labored breathing at this time.

## 2023-10-15 NOTE — ED NOTES
Per lab pts potassium is 2.8. MD made aware, no new orders at this time.       Kaleigh Garza RN  10/15/23 1808

## 2023-10-15 NOTE — ED PROVIDER NOTES
[facility] EMERGENCY DEPT  EMERGENCY DEPARTMENT ENCOUNTER      Pt Name: Filomena Camarena  MRN: [id number]  [address] [facility] 1960  Date of evaluation: 10/15/2023  Provider: Yifan Kim MD    [address]       Chief Complaint   Patient presents with    Shortness of Breath    Cough         HISTORY OF PRESENT ILLNESS   (Location/Symptom, Timing/Onset, Context/Setting, Quality, Duration, Modifying Factors, Severity)  Note limiting factors. Filomena Camarena is a 65 y.o. female who presents to the emergency department with complaint of shortness of breath and cough for 3 days. She has COPD and continues to smoke . No fever or chills . No chest pain , nausea or vomiting. Worse with exertion     HPI    Nursing Notes were reviewed. REVIEW OF SYSTEMS    (2-9 systems for level 4, 10 or more for level 5)     Review of Systems   Constitutional: Negative. Negative for diaphoresis, fatigue and fever. HENT: Negative. Eyes: Negative. Respiratory:  Positive for cough, shortness of breath and wheezing. Cardiovascular: Negative. Negative for chest pain, palpitations and leg swelling. Gastrointestinal: Negative. Endocrine: Negative. Genitourinary: Negative. Musculoskeletal: Negative. Allergic/Immunologic: Negative. Neurological: Negative. Hematological: Negative. Psychiatric/Behavioral: Negative. Except as noted above the remainder of the review of systems was reviewed and negative.        PAST MEDICAL HISTORY     Past Medical History:   Diagnosis Date    Hypercholesterolemia     Hypertension 12/10/2020    Hypokalemia 12/10/2020    Menopause     Personal history of colonic polyps 11/17/2021         SURGICAL HISTORY       Past Surgical History:   Procedure Laterality Date    COLONOSCOPY N/A 12/3/2021    COLONOSCOPY (TIVA) performed by Nuvia Perez MD at [facility] ENDOSCOPY    HYSTERECTOMY (CERVIX STATUS UNKNOWN)      partial         CURRENT MEDICATIONS       Previous Medications

## 2023-10-16 LAB
EKG ATRIAL RATE: 71 BPM
EKG DIAGNOSIS: NORMAL
EKG P AXIS: 22 DEGREES
EKG P-R INTERVAL: 189 MS
EKG Q-T INTERVAL: 462 MS
EKG QRS DURATION: 163 MS
EKG QTC CALCULATION (BAZETT): 503 MS
EKG R AXIS: -51 DEGREES
EKG T AXIS: 126 DEGREES
EKG VENTRICULAR RATE: 71 BPM

## 2023-10-17 ENCOUNTER — TELEPHONE (OUTPATIENT)
Facility: CLINIC | Age: 63
End: 2023-10-17

## 2023-10-17 DIAGNOSIS — U07.1 COVID-19: Primary | ICD-10-CM

## 2023-10-17 NOTE — TELEPHONE ENCOUNTER
Pt is covid positive Sunday and she is requesting medication ([facility]) -symptoms started Saturday. She states she is very SOB, congested and has diarrhea.  Please call pt

## 2023-12-01 ENCOUNTER — OFFICE VISIT (OUTPATIENT)
Facility: CLINIC | Age: 63
End: 2023-12-01
Payer: OTHER GOVERNMENT

## 2023-12-01 VITALS
DIASTOLIC BLOOD PRESSURE: 73 MMHG | RESPIRATION RATE: 18 BRPM | WEIGHT: 196 LBS | BODY MASS INDEX: 33.46 KG/M2 | TEMPERATURE: 97.2 F | OXYGEN SATURATION: 95 % | SYSTOLIC BLOOD PRESSURE: 136 MMHG | HEART RATE: 71 BPM | HEIGHT: 64 IN

## 2023-12-01 DIAGNOSIS — E78.2 MIXED HYPERLIPIDEMIA: ICD-10-CM

## 2023-12-01 DIAGNOSIS — E87.6 HYPOKALEMIA: ICD-10-CM

## 2023-12-01 DIAGNOSIS — I10 PRIMARY HYPERTENSION: ICD-10-CM

## 2023-12-01 DIAGNOSIS — Z12.31 SCREENING MAMMOGRAM, ENCOUNTER FOR: ICD-10-CM

## 2023-12-01 DIAGNOSIS — M1A.9XX0 CHRONIC GOUT WITHOUT TOPHUS, UNSPECIFIED CAUSE, UNSPECIFIED SITE: ICD-10-CM

## 2023-12-01 DIAGNOSIS — R73.03 PREDIABETES: ICD-10-CM

## 2023-12-01 DIAGNOSIS — F17.200 TOBACCO DEPENDENCE: ICD-10-CM

## 2023-12-01 PROCEDURE — 99214 OFFICE O/P EST MOD 30 MIN: CPT | Performed by: NURSE PRACTITIONER

## 2023-12-01 PROCEDURE — 3075F SYST BP GE 130 - 139MM HG: CPT | Performed by: NURSE PRACTITIONER

## 2023-12-01 PROCEDURE — 3078F DIAST BP <80 MM HG: CPT | Performed by: NURSE PRACTITIONER

## 2023-12-01 PROCEDURE — 90471 IMMUNIZATION ADMIN: CPT | Performed by: NURSE PRACTITIONER

## 2023-12-01 PROCEDURE — 90674 CCIIV4 VAC NO PRSV 0.5 ML IM: CPT | Performed by: NURSE PRACTITIONER

## 2023-12-01 RX ORDER — POTASSIUM CHLORIDE 750 MG/1
10 TABLET, EXTENDED RELEASE ORAL DAILY
Qty: 90 TABLET | Refills: 1 | Status: SHIPPED | OUTPATIENT
Start: 2023-12-01

## 2023-12-01 RX ORDER — LISINOPRIL AND HYDROCHLOROTHIAZIDE 25; 20 MG/1; MG/1
1 TABLET ORAL DAILY
Qty: 90 TABLET | Refills: 1 | Status: SHIPPED | OUTPATIENT
Start: 2023-12-01

## 2023-12-01 RX ORDER — ATORVASTATIN CALCIUM 40 MG/1
40 TABLET, FILM COATED ORAL DAILY
Qty: 90 TABLET | Refills: 1 | Status: SHIPPED | OUTPATIENT
Start: 2023-12-01

## 2023-12-01 RX ORDER — ALBUTEROL SULFATE 90 UG/1
2 AEROSOL, METERED RESPIRATORY (INHALATION) 4 TIMES DAILY PRN
Qty: 18 G | Refills: 2 | Status: SHIPPED | OUTPATIENT
Start: 2023-12-01

## 2023-12-01 RX ORDER — AMLODIPINE BESYLATE 10 MG/1
10 TABLET ORAL DAILY
Qty: 90 TABLET | Refills: 1 | Status: SHIPPED | OUTPATIENT
Start: 2023-12-01

## 2023-12-01 ASSESSMENT — ENCOUNTER SYMPTOMS
PHOTOPHOBIA: 0
VOMITING: 0
EYE DISCHARGE: 0
APNEA: 0
ABDOMINAL PAIN: 0
EYE PAIN: 0
SORE THROAT: 0
EYE ITCHING: 0
FACIAL SWELLING: 0
STRIDOR: 0
BACK PAIN: 0
WHEEZING: 1
DIARRHEA: 0
COLOR CHANGE: 0
NAUSEA: 0
CHOKING: 0
EYE REDNESS: 0
BLOOD IN STOOL: 0
SINUS PAIN: 0
ABDOMINAL DISTENTION: 0
TROUBLE SWALLOWING: 0
CONSTIPATION: 0

## 2023-12-01 NOTE — PROGRESS NOTES
Tiffanie Anton is a 62 y.o. female who presents to the office today for the following:    Chief Complaint   Patient presents with    Hypertension          Past Medical History:   Diagnosis Date    Hypertension 12/10/2020    Hypokalemia 12/10/2020    Menopause     Personal history of colonic polyps 11/17/2021       Past Surgical History:   Procedure Laterality Date    COLONOSCOPY N/A 12/3/2021    COLONOSCOPY (TIVA) performed by Madisyn Egan MD at [facility] ENDOSCOPY    HX HYSTERECTOMY      partial        Family History   Problem Relation Age of Onset    Hypertension Mother     Breast Cancer Maternal Grandmother     Breast Cancer Paternal Grandmother     Cancer Father         Social History     Tobacco Use    Smoking status: Current Every Day Smoker     Packs/day: 1.00    Smokeless tobacco: Never Used   Vaping Use    Vaping Use: Never used   Substance Use Topics    Alcohol use: Not Currently    Drug use: Never        HPI  Patient here today for follow up of chronic conditions with PMH of hypertension, hyperlipidemia, tobacco dependence and obesity. States that she is taking medicines as directed but needs refills on inhaler. Was tread in ED in October  due to covid 19 and low potassium. Reports breathing is improved since then and only using albuterol occasionally. Is trying to reduce how much she is smoking.      Current Outpatient Medications   Medication Sig Dispense Refill    amLODIPine (NORVASC) 10 MG tablet Take 1 tablet by mouth daily 90 tablet 1    atorvastatin (LIPITOR) 40 MG tablet Take 1 tablet by mouth daily 90 tablet 1    lisinopril-hydroCHLOROthiazide (PRINZIDE;ZESTORETIC) 20-25 MG per tablet Take 1 tablet by mouth daily 90 tablet 1    albuterol sulfate HFA (VENTOLIN HFA) 108 (90 Base) MCG/ACT inhaler Inhale 2 puffs into the lungs 4 times daily as needed for Wheezing 18 g 2    potassium chloride (KLOR-CON M) 10 MEQ extended release tablet Take 1 tablet by mouth daily 90 tablet 1     No current

## 2023-12-01 NOTE — PROGRESS NOTES
Chief Complaint   Patient presents with    Hypertension     Follow up   No other c/o  Pt did not bring meds, went over list, pt confirmed      1. Have you been to the ER, urgent care clinic since your last visit? Hospitalized since your last visit? In chart     2. Have you seen or consulted any other health care providers outside of the [address] [place] [address] Avenue since your last visit? Include any pap smears or colon screening.  No

## 2023-12-02 LAB
ALBUMIN SERPL-MCNC: 4.4 G/DL (ref 3.9–4.9)
ALBUMIN/GLOB SERPL: 1.6 {RATIO} (ref 1.2–2.2)
ALP SERPL-CCNC: 113 IU/L (ref 44–121)
ALT SERPL-CCNC: 17 IU/L (ref 0–32)
AST SERPL-CCNC: 18 IU/L (ref 0–40)
BASOPHILS # BLD AUTO: 0.1 X10E3/UL (ref 0–0.2)
BASOPHILS NFR BLD AUTO: 1 %
BILIRUB SERPL-MCNC: 0.4 MG/DL (ref 0–1.2)
BUN SERPL-MCNC: 12 MG/DL (ref 8–27)
BUN/CREAT SERPL: 12 (ref 12–28)
CALCIUM SERPL-MCNC: 9.9 MG/DL (ref 8.7–10.3)
CHLORIDE SERPL-SCNC: 103 MMOL/L (ref 96–106)
CO2 SERPL-SCNC: 23 MMOL/L (ref 20–29)
CREAT SERPL-MCNC: 1 MG/DL (ref 0.57–1)
EGFRCR SERPLBLD CKD-EPI 2021: 64 ML/MIN/1.73
EOSINOPHIL # BLD AUTO: 0.2 X10E3/UL (ref 0–0.4)
EOSINOPHIL NFR BLD AUTO: 5 %
ERYTHROCYTE [DISTWIDTH] IN BLOOD BY AUTOMATED COUNT: 14.3 % (ref 11.7–15.4)
GLOBULIN SER CALC-MCNC: 2.7 G/DL (ref 1.5–4.5)
GLUCOSE SERPL-MCNC: 59 MG/DL (ref 70–99)
HBA1C MFR BLD: 5.9 % (ref 4.8–5.6)
HCT VFR BLD AUTO: 44.9 % (ref 34–46.6)
HGB BLD-MCNC: 14.9 G/DL (ref 11.1–15.9)
IMM GRANULOCYTES # BLD AUTO: 0 X10E3/UL (ref 0–0.1)
IMM GRANULOCYTES NFR BLD AUTO: 0 %
LYMPHOCYTES # BLD AUTO: 2.2 X10E3/UL (ref 0.7–3.1)
LYMPHOCYTES NFR BLD AUTO: 42 %
MCH RBC QN AUTO: 27.9 PG (ref 26.6–33)
MCHC RBC AUTO-ENTMCNC: 33.2 G/DL (ref 31.5–35.7)
MCV RBC AUTO: 84 FL (ref 79–97)
MONOCYTES # BLD AUTO: 0.5 X10E3/UL (ref 0.1–0.9)
MONOCYTES NFR BLD AUTO: 10 %
NEUTROPHILS # BLD AUTO: 2.1 X10E3/UL (ref 1.4–7)
NEUTROPHILS NFR BLD AUTO: 42 %
PLATELET # BLD AUTO: 203 X10E3/UL (ref 150–450)
POTASSIUM SERPL-SCNC: 3.8 MMOL/L (ref 3.5–5.2)
PROT SERPL-MCNC: 7.1 G/DL (ref 6–8.5)
RBC # BLD AUTO: 5.34 X10E6/UL (ref 3.77–5.28)
SODIUM SERPL-SCNC: 141 MMOL/L (ref 134–144)
URATE SERPL-MCNC: 6.8 MG/DL (ref 3–7.2)
WBC # BLD AUTO: 5.1 X10E3/UL (ref 3.4–10.8)

## 2024-01-05 ENCOUNTER — TRANSCRIBE ORDERS (OUTPATIENT)
Facility: HOSPITAL | Age: 64
End: 2024-01-05

## 2024-01-05 ENCOUNTER — HOSPITAL ENCOUNTER (OUTPATIENT)
Facility: HOSPITAL | Age: 64
Discharge: HOME | End: 2024-01-05
Payer: OTHER GOVERNMENT

## 2024-01-05 VITALS — BODY MASS INDEX: 31.58 KG/M2 | HEIGHT: 64 IN | WEIGHT: 185 LBS

## 2024-01-05 DIAGNOSIS — Z12.31 SCREENING MAMMOGRAM FOR HIGH-RISK PATIENT: Primary | ICD-10-CM

## 2024-01-05 DIAGNOSIS — Z12.31 SCREENING MAMMOGRAM FOR HIGH-RISK PATIENT: ICD-10-CM

## 2024-01-05 PROCEDURE — 77063 BREAST TOMOSYNTHESIS BI: CPT

## 2024-02-21 ENCOUNTER — OFFICE VISIT (OUTPATIENT)
Age: 64
End: 2024-02-21

## 2024-02-21 ENCOUNTER — PREP FOR PROCEDURE (OUTPATIENT)
Age: 64
End: 2024-02-21

## 2024-02-21 VITALS
BODY MASS INDEX: 33.05 KG/M2 | SYSTOLIC BLOOD PRESSURE: 130 MMHG | WEIGHT: 193.6 LBS | DIASTOLIC BLOOD PRESSURE: 90 MMHG | RESPIRATION RATE: 14 BRPM | HEART RATE: 63 BPM | OXYGEN SATURATION: 95 % | TEMPERATURE: 98 F | HEIGHT: 64 IN

## 2024-02-21 DIAGNOSIS — K57.30 DIVERTICULAR DISEASE OF COLON: ICD-10-CM

## 2024-02-21 DIAGNOSIS — Z86.010 PERSONAL HISTORY OF COLONIC POLYPS: Primary | ICD-10-CM

## 2024-02-21 RX ORDER — POLYETHYLENE GLYCOL 3350 17 G/17G
POWDER, FOR SOLUTION ORAL
Qty: 510 G | Refills: 0 | Status: SHIPPED | OUTPATIENT
Start: 2024-02-21

## 2024-02-21 ASSESSMENT — PATIENT HEALTH QUESTIONNAIRE - PHQ9
1. LITTLE INTEREST OR PLEASURE IN DOING THINGS: 0
2. FEELING DOWN, DEPRESSED OR HOPELESS: 0
SUM OF ALL RESPONSES TO PHQ QUESTIONS 1-9: 0
SUM OF ALL RESPONSES TO PHQ9 QUESTIONS 1 & 2: 0
SUM OF ALL RESPONSES TO PHQ QUESTIONS 1-9: 0

## 2024-02-21 NOTE — PROGRESS NOTES
1. Have you been to the ER, urgent care clinic since your last visit?  Hospitalized since your last visit? no    2. Have you seen or consulted any other health care providers outside of the [facility] since your last visit?  Include any pap smears or colon screening.  No   Chief Complaint   Patient presents with    hx of colon polyps    Follow-up     BP (!) 130/90 (Site: Left Upper Arm, Position: Sitting, Cuff Size: Large Adult)   Pulse 63   Temp 98 °F (36.7 °C) (Temporal)   Resp 14   Ht 1.626 m (5' 4\")   Wt 87.8 kg (193 lb 9.6 oz)   SpO2 95% Comment: room air  BMI 33.23 kg/m²

## 2024-02-22 ASSESSMENT — ENCOUNTER SYMPTOMS
DIARRHEA: 0
VOMITING: 0
RESPIRATORY NEGATIVE: 1
NAUSEA: 0
ANAL BLEEDING: 0
ALLERGIC/IMMUNOLOGIC NEGATIVE: 1
ABDOMINAL PAIN: 0
RECTAL PAIN: 0
CONSTIPATION: 0
ABDOMINAL DISTENTION: 0
BLOOD IN STOOL: 0

## 2024-02-22 NOTE — PROGRESS NOTES
Giovanna Zepeda is a 63 y.o. female who presents today for the following:  Chief Complaint   Patient presents with    hx of colon polyps    Follow-up     Colonoscopy 2021         No Known Allergies    Current Outpatient Medications   Medication Sig Dispense Refill    polyethylene glycol (GLYCOLAX) 17 GM/SCOOP powder Use as directed by physician. 510 g 0    amLODIPine (NORVASC) 10 MG tablet Take 1 tablet by mouth daily 90 tablet 1    atorvastatin (LIPITOR) 40 MG tablet Take 1 tablet by mouth daily 90 tablet 1    lisinopril-hydroCHLOROthiazide (PRINZIDE;ZESTORETIC) 20-25 MG per tablet Take 1 tablet by mouth daily 90 tablet 1    albuterol sulfate HFA (VENTOLIN HFA) 108 (90 Base) MCG/ACT inhaler Inhale 2 puffs into the lungs 4 times daily as needed for Wheezing 18 g 2    potassium chloride (KLOR-CON M) 10 MEQ extended release tablet Take 1 tablet by mouth daily 90 tablet 1     No current facility-administered medications for this visit.       Past Medical History:   Diagnosis Date    Hypercholesterolemia     Hypertension 12/10/2020    Hypokalemia 12/10/2020    Menopause     Personal history of colonic polyps 11/17/2021       Past Surgical History:   Procedure Laterality Date    COLONOSCOPY N/A 12/3/2021    COLONOSCOPY (TIVA) performed by Betty Avalos MD at [facility] ENDOSCOPY    HYSTERECTOMY (CERVIX STATUS UNKNOWN)      partial       Family History   Problem Relation Age of Onset    Cancer Father     Breast Cancer Paternal Grandmother     Breast Cancer Maternal Grandmother     Hypertension Mother        Social History     Socioeconomic History    Marital status:      Spouse name: Not on file    Number of children: Not on file    Years of education: Not on file    Highest education level: Not on file   Occupational History    Not on file   Tobacco Use    Smoking status: Every Day     Current packs/day: 1.00     Types: Cigarettes    Smokeless tobacco: Never   Vaping Use    Vaping Use: Never used   Substance and

## 2024-02-22 NOTE — H&P (VIEW-ONLY)
Giovanna Zepeda is a 63 y.o. female who presents today for the following:  Chief Complaint   Patient presents with    hx of colon polyps    Follow-up     Colonoscopy 2021         No Known Allergies    Current Outpatient Medications   Medication Sig Dispense Refill    polyethylene glycol (GLYCOLAX) 17 GM/SCOOP powder Use as directed by physician. 510 g 0    amLODIPine (NORVASC) 10 MG tablet Take 1 tablet by mouth daily 90 tablet 1    atorvastatin (LIPITOR) 40 MG tablet Take 1 tablet by mouth daily 90 tablet 1    lisinopril-hydroCHLOROthiazide (PRINZIDE;ZESTORETIC) 20-25 MG per tablet Take 1 tablet by mouth daily 90 tablet 1    albuterol sulfate HFA (VENTOLIN HFA) 108 (90 Base) MCG/ACT inhaler Inhale 2 puffs into the lungs 4 times daily as needed for Wheezing 18 g 2    potassium chloride (KLOR-CON M) 10 MEQ extended release tablet Take 1 tablet by mouth daily 90 tablet 1     No current facility-administered medications for this visit.       Past Medical History:   Diagnosis Date    Hypercholesterolemia     Hypertension 12/10/2020    Hypokalemia 12/10/2020    Menopause     Personal history of colonic polyps 11/17/2021       Past Surgical History:   Procedure Laterality Date    COLONOSCOPY N/A 12/3/2021    COLONOSCOPY (TIVA) performed by Betty Avalos MD at [facility] ENDOSCOPY    HYSTERECTOMY (CERVIX STATUS UNKNOWN)      partial       Family History   Problem Relation Age of Onset    Cancer Father     Breast Cancer Paternal Grandmother     Breast Cancer Maternal Grandmother     Hypertension Mother        Social History     Socioeconomic History    Marital status:      Spouse name: Not on file    Number of children: Not on file    Years of education: Not on file    Highest education level: Not on file   Occupational History    Not on file   Tobacco Use    Smoking status: Every Day     Current packs/day: 1.00     Types: Cigarettes    Smokeless tobacco: Never   Vaping Use    Vaping Use: Never used   Substance and

## 2024-03-08 ENCOUNTER — ANESTHESIA (OUTPATIENT)
Facility: HOSPITAL | Age: 64
End: 2024-03-08
Payer: OTHER GOVERNMENT

## 2024-03-08 ENCOUNTER — ANESTHESIA EVENT (OUTPATIENT)
Facility: HOSPITAL | Age: 64
End: 2024-03-08
Payer: OTHER GOVERNMENT

## 2024-03-08 ENCOUNTER — HOSPITAL ENCOUNTER (OUTPATIENT)
Facility: HOSPITAL | Age: 64
Setting detail: OUTPATIENT SURGERY
Discharge: HOME | End: 2024-03-08
Attending: INTERNAL MEDICINE | Admitting: INTERNAL MEDICINE
Payer: OTHER GOVERNMENT

## 2024-03-08 VITALS
SYSTOLIC BLOOD PRESSURE: 129 MMHG | DIASTOLIC BLOOD PRESSURE: 81 MMHG | WEIGHT: 186.7 LBS | HEIGHT: 65 IN | RESPIRATION RATE: 18 BRPM | HEART RATE: 72 BPM | OXYGEN SATURATION: 95 % | BODY MASS INDEX: 31.11 KG/M2 | TEMPERATURE: 98.4 F

## 2024-03-08 PROCEDURE — 2709999900 HC NON-CHARGEABLE SUPPLY: Performed by: INTERNAL MEDICINE

## 2024-03-08 PROCEDURE — 6360000002 HC RX W HCPCS: Performed by: NURSE ANESTHETIST, CERTIFIED REGISTERED

## 2024-03-08 PROCEDURE — 7100000010 HC PHASE II RECOVERY - FIRST 15 MIN: Performed by: INTERNAL MEDICINE

## 2024-03-08 PROCEDURE — 7100000011 HC PHASE II RECOVERY - ADDTL 15 MIN: Performed by: INTERNAL MEDICINE

## 2024-03-08 PROCEDURE — 2580000003 HC RX 258: Performed by: INTERNAL MEDICINE

## 2024-03-08 PROCEDURE — 3700000001 HC ADD 15 MINUTES (ANESTHESIA): Performed by: INTERNAL MEDICINE

## 2024-03-08 PROCEDURE — 3600007502: Performed by: INTERNAL MEDICINE

## 2024-03-08 PROCEDURE — 3700000000 HC ANESTHESIA ATTENDED CARE: Performed by: INTERNAL MEDICINE

## 2024-03-08 PROCEDURE — 3600007512: Performed by: INTERNAL MEDICINE

## 2024-03-08 PROCEDURE — 88305 TISSUE EXAM BY PATHOLOGIST: CPT

## 2024-03-08 RX ORDER — SODIUM CHLORIDE 9 MG/ML
25 INJECTION, SOLUTION INTRAVENOUS PRN
Status: DISCONTINUED | OUTPATIENT
Start: 2024-03-08 | End: 2024-03-08 | Stop reason: HOSPADM

## 2024-03-08 RX ORDER — GLYCOPYRROLATE 0.2 MG/ML
INJECTION INTRAMUSCULAR; INTRAVENOUS PRN
Status: DISCONTINUED | OUTPATIENT
Start: 2024-03-08 | End: 2024-03-08 | Stop reason: SDUPTHER

## 2024-03-08 RX ORDER — PROPOFOL 10 MG/ML
INJECTION, EMULSION INTRAVENOUS PRN
Status: DISCONTINUED | OUTPATIENT
Start: 2024-03-08 | End: 2024-03-08 | Stop reason: SDUPTHER

## 2024-03-08 RX ADMIN — GLYCOPYRROLATE 0.2 MG: 0.2 INJECTION INTRAMUSCULAR; INTRAVENOUS at 12:39

## 2024-03-08 RX ADMIN — PROPOFOL 100 MG: 10 INJECTION, EMULSION INTRAVENOUS at 12:43

## 2024-03-08 RX ADMIN — PROPOFOL 50 MG: 10 INJECTION, EMULSION INTRAVENOUS at 12:53

## 2024-03-08 RX ADMIN — SODIUM CHLORIDE 25 ML: 9 INJECTION, SOLUTION INTRAVENOUS at 11:22

## 2024-03-08 RX ADMIN — PROPOFOL 50 MG: 10 INJECTION, EMULSION INTRAVENOUS at 12:59

## 2024-03-08 ASSESSMENT — LIFESTYLE VARIABLES: SMOKING_STATUS: 1

## 2024-03-08 ASSESSMENT — PAIN - FUNCTIONAL ASSESSMENT
PAIN_FUNCTIONAL_ASSESSMENT: 0-10

## 2024-03-08 NOTE — OP NOTE
colonoscope     ASSISTANT:Circulator: Lena Hammond RN              [employer] [facility]: Rebecca Garnett     ANESTHESIA: TIVA      QUALITY OF PREPARATION: Good      FINDINGS:   Ascending colon polyp  Rectal polyps  Left-sided diverticulosis      Complication:  None         EBL: Minimal     SPECIMENS:   ID Type Source Tests Collected by Time Destination   1 : Ascending Colon Polyp Tissue Tissue SURGICAL PATHOLOGY Betty Avalos Jr., MD 3/8/2024 1250    2 : Rectal Colon Polyps Tissue Tissue SURGICAL PATHOLOGY Betty Avalos Jr., MD 3/8/2024 1257              Betty Avalos Jr, MD  March 8, 2024  1:13 PM

## 2024-03-08 NOTE — ANESTHESIA POSTPROCEDURE EVALUATION
Department of Anesthesiology  Postprocedure Note    Patient: Giovanna Zepeda  MRN: [id number]  YOB: 1960  Date of evaluation: 3/8/2024    Procedure Summary       Date: 03/08/24 Room / Location: [facility] ENDO 01 / [facility] ENDOSCOPY    Anesthesia Start: 1238 Anesthesia Stop: 1316    Procedure: COLONOSCOPY BIOPSY (Anus) Diagnosis:       Personal history of colonic polyps      (Personal history of colonic polyps [Z86.010])    Surgeons: Betty Avalos Jr., MD Responsible Provider: Xiang Kate APRN - CRNA    Anesthesia Type: MAC ASA Status: 3            Anesthesia Type: MAC    Carmine Phase I: Carmine Score: 10    Carmine Phase II:      Anesthesia Post Evaluation    Patient location during evaluation: bedside  Patient participation: complete - patient participated  Level of consciousness: sleepy but conscious  Pain score: 0  Airway patency: patent  Nausea & Vomiting: no vomiting and no nausea  Cardiovascular status: blood pressure returned to baseline  Respiratory status: room air  Hydration status: stable    No notable events documented.

## 2024-03-08 NOTE — ANESTHESIA PRE PROCEDURE
Department of Anesthesiology  Preprocedure Note       Name:  Giovanna Zepeda   Age:  63 y.o.  :  1960                                          MRN:  [id number]         Date:  3/8/2024      Surgeon: Surgeon(s):  Betty Avalos Jr., MD    Procedure: Procedure(s):  COLONOSCOPY DIAGNOSTIC    Medications prior to admission:   Prior to Admission medications    Medication Sig Start Date End Date Taking? Authorizing Provider   polyethylene glycol (GLYCOLAX) 17 GM/SCOOP powder Use as directed by physician. 24   Betty Avalos Jr., MD   amLODIPine (NORVASC) 10 MG tablet Take 1 tablet by mouth daily 23   Margie Angeles APRN - NP   atorvastatin (LIPITOR) 40 MG tablet Take 1 tablet by mouth daily 23   Margie Angeles APRN - NP   lisinopril-hydroCHLOROthiazide (PRINZIDE;ZESTORETIC) 20-25 MG per tablet Take 1 tablet by mouth daily 23   Margie Angeles APRN - NP   albuterol sulfate HFA (VENTOLIN HFA) 108 (90 Base) MCG/ACT inhaler Inhale 2 puffs into the lungs 4 times daily as needed for Wheezing 23   Margie Angeles APRN - NP   potassium chloride (KLOR-CON M) 10 MEQ extended release tablet Take 1 tablet by mouth daily 23   Margie Angeles APRN - NP       Current medications:    Current Facility-Administered Medications   Medication Dose Route Frequency Provider Last Rate Last Admin   • 0.9 % sodium chloride infusion  25 mL IntraVENous PRN Betty Avalos Jr.,  mL/hr at 24 1122 25 mL at 24 1122       Allergies:  No Known Allergies    Problem List:    Patient Active Problem List   Diagnosis Code   • Personal history of colonic polyps Z86.010   • Hypertension I10   • 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

## 2024-03-08 NOTE — DISCHARGE INSTRUCTIONS
For the next 12 hours you should not:   1. drive   2. drink alcohol   3. operate any machinery   4. engage in activities that require mental sharpness or manual dexterity such as     cooking   5. take any drugs other than those prescribed by a physician   6. make any legal or financial decisions    Call your doctor's office immediately, if there is is anything unusual:   1. increased and continuing rectal bleeding   2. fever   3. Unusual abdominal pain    Take it easy today and resume normal activity tomorrow.It is common to have gas and mild bloating for a few hours. Pain is NOT normal. You may be groggy off and on for a few hours.    Resume previous diet.        Betty Avalos Jr, MD  3/8/2024  1:18 PM

## 2024-03-08 NOTE — INTERVAL H&P NOTE
Update History & Physical    The patient's History and Physical of March 8, 2024 was reviewed with the patient and I examined the patient. There was no change. The surgical site was confirmed by the patient and me.     Plan: The risks, benefits, expected outcome, and alternative to the recommended procedure have been discussed with the patient. Patient understands and wants to proceed with the procedure.     Electronically signed by Betty Avalos Jr, MD on 3/8/2024 at 11:18 AM

## 2024-05-13 ENCOUNTER — HOSPITAL ENCOUNTER (EMERGENCY)
Facility: HOSPITAL | Age: 64
Discharge: HOME | End: 2024-05-13
Attending: STUDENT IN AN ORGANIZED HEALTH CARE EDUCATION/TRAINING PROGRAM
Payer: OTHER GOVERNMENT

## 2024-05-13 ENCOUNTER — APPOINTMENT (OUTPATIENT)
Facility: HOSPITAL | Age: 64
End: 2024-05-13
Payer: OTHER GOVERNMENT

## 2024-05-13 VITALS
HEART RATE: 78 BPM | SYSTOLIC BLOOD PRESSURE: 133 MMHG | WEIGHT: 183 LBS | RESPIRATION RATE: 18 BRPM | DIASTOLIC BLOOD PRESSURE: 78 MMHG | BODY MASS INDEX: 31.24 KG/M2 | TEMPERATURE: 97.6 F | HEIGHT: 64 IN | OXYGEN SATURATION: 98 %

## 2024-05-13 DIAGNOSIS — J44.1 COPD EXACERBATION (HCC): Primary | ICD-10-CM

## 2024-05-13 DIAGNOSIS — E87.6 HYPOKALEMIA: ICD-10-CM

## 2024-05-13 LAB
ALBUMIN SERPL-MCNC: 3.1 G/DL (ref 3.5–5)
ALBUMIN/GLOB SERPL: 0.9 (ref 1.1–2.2)
ALP SERPL-CCNC: 102 U/L (ref 45–117)
ALT SERPL-CCNC: 29 U/L (ref 12–78)
ANION GAP SERPL CALC-SCNC: 12 MMOL/L (ref 5–15)
AST SERPL W P-5'-P-CCNC: 21 U/L (ref 15–37)
BASOPHILS # BLD: 0.1 K/UL (ref 0–0.1)
BASOPHILS NFR BLD: 1 % (ref 0–1)
BILIRUB SERPL-MCNC: 0.3 MG/DL (ref 0.2–1)
BNP SERPL-MCNC: 1162 PG/ML
BUN SERPL-MCNC: 16 MG/DL (ref 6–20)
BUN/CREAT SERPL: 14 (ref 12–20)
CA-I BLD-MCNC: 9.1 MG/DL (ref 8.5–10.1)
CHLORIDE SERPL-SCNC: 108 MMOL/L (ref 97–108)
CO2 SERPL-SCNC: 23 MMOL/L (ref 21–32)
CREAT SERPL-MCNC: 1.11 MG/DL (ref 0.55–1.02)
DIFFERENTIAL METHOD BLD: ABNORMAL
EOSINOPHIL # BLD: 0.3 K/UL (ref 0–0.4)
EOSINOPHIL NFR BLD: 6 % (ref 0–7)
ERYTHROCYTE [DISTWIDTH] IN BLOOD BY AUTOMATED COUNT: 15.3 % (ref 11.5–14.5)
GLOBULIN SER CALC-MCNC: 3.5 G/DL (ref 2–4)
GLUCOSE SERPL-MCNC: 116 MG/DL (ref 65–100)
HCT VFR BLD AUTO: 40 % (ref 35–47)
HGB BLD-MCNC: 13.3 G/DL (ref 11.5–16)
IMM GRANULOCYTES # BLD AUTO: 0 K/UL (ref 0–0.04)
IMM GRANULOCYTES NFR BLD AUTO: 0 % (ref 0–0.5)
LYMPHOCYTES # BLD: 2.5 K/UL (ref 0.8–3.5)
LYMPHOCYTES NFR BLD: 55 % (ref 12–49)
MAGNESIUM SERPL-MCNC: 1.8 MG/DL (ref 1.6–2.4)
MCH RBC QN AUTO: 28 PG (ref 26–34)
MCHC RBC AUTO-ENTMCNC: 33.3 G/DL (ref 30–36.5)
MCV RBC AUTO: 84.2 FL (ref 80–99)
MONOCYTES # BLD: 0.4 K/UL (ref 0–1)
MONOCYTES NFR BLD: 9 % (ref 5–13)
NEUTS SEG # BLD: 1.3 K/UL (ref 1.8–8)
NEUTS SEG NFR BLD: 29 % (ref 32–75)
NRBC # BLD: 0 K/UL (ref 0–0.01)
NRBC BLD-RTO: 0 PER 100 WBC
PLATELET # BLD AUTO: 188 K/UL (ref 150–400)
PMV BLD AUTO: 9.9 FL (ref 8.9–12.9)
POTASSIUM SERPL-SCNC: 3.1 MMOL/L (ref 3.5–5.1)
PROT SERPL-MCNC: 6.6 G/DL (ref 6.4–8.2)
RBC # BLD AUTO: 4.75 M/UL (ref 3.8–5.2)
SODIUM SERPL-SCNC: 143 MMOL/L (ref 136–145)
TROPONIN I SERPL HS-MCNC: 24 NG/L (ref 0–51)
TROPONIN I SERPL HS-MCNC: 25 NG/L (ref 0–51)
WBC # BLD AUTO: 4.5 K/UL (ref 3.6–11)

## 2024-05-13 PROCEDURE — 94640 AIRWAY INHALATION TREATMENT: CPT

## 2024-05-13 PROCEDURE — 96367 TX/PROPH/DG ADDL SEQ IV INF: CPT

## 2024-05-13 PROCEDURE — 71045 X-RAY EXAM CHEST 1 VIEW: CPT

## 2024-05-13 PROCEDURE — 85025 COMPLETE CBC W/AUTO DIFF WBC: CPT

## 2024-05-13 PROCEDURE — 6370000000 HC RX 637 (ALT 250 FOR IP): Performed by: STUDENT IN AN ORGANIZED HEALTH CARE EDUCATION/TRAINING PROGRAM

## 2024-05-13 PROCEDURE — 96375 TX/PRO/DX INJ NEW DRUG ADDON: CPT

## 2024-05-13 PROCEDURE — 99285 EMERGENCY DEPT VISIT HI MDM: CPT

## 2024-05-13 PROCEDURE — 36415 COLL VENOUS BLD VENIPUNCTURE: CPT

## 2024-05-13 PROCEDURE — 84484 ASSAY OF TROPONIN QUANT: CPT

## 2024-05-13 PROCEDURE — 6360000002 HC RX W HCPCS: Performed by: STUDENT IN AN ORGANIZED HEALTH CARE EDUCATION/TRAINING PROGRAM

## 2024-05-13 PROCEDURE — 2580000003 HC RX 258: Performed by: STUDENT IN AN ORGANIZED HEALTH CARE EDUCATION/TRAINING PROGRAM

## 2024-05-13 PROCEDURE — 83880 ASSAY OF NATRIURETIC PEPTIDE: CPT

## 2024-05-13 PROCEDURE — 93005 ELECTROCARDIOGRAM TRACING: CPT | Performed by: STUDENT IN AN ORGANIZED HEALTH CARE EDUCATION/TRAINING PROGRAM

## 2024-05-13 PROCEDURE — 80053 COMPREHEN METABOLIC PANEL: CPT

## 2024-05-13 PROCEDURE — 83735 ASSAY OF MAGNESIUM: CPT

## 2024-05-13 PROCEDURE — 96365 THER/PROPH/DIAG IV INF INIT: CPT

## 2024-05-13 RX ORDER — ALBUTEROL SULFATE 90 UG/1
2 AEROSOL, METERED RESPIRATORY (INHALATION) EVERY 4 HOURS PRN
Qty: 18 G | Refills: 2 | Status: SHIPPED | OUTPATIENT
Start: 2024-05-13

## 2024-05-13 RX ORDER — IPRATROPIUM BROMIDE AND ALBUTEROL SULFATE 2.5; .5 MG/3ML; MG/3ML
1 SOLUTION RESPIRATORY (INHALATION)
Status: COMPLETED | OUTPATIENT
Start: 2024-05-13 | End: 2024-05-13

## 2024-05-13 RX ORDER — PREDNISONE 20 MG/1
40 TABLET ORAL DAILY
Qty: 10 TABLET | Refills: 0 | Status: SHIPPED | OUTPATIENT
Start: 2024-05-13 | End: 2024-05-18

## 2024-05-13 RX ORDER — MAGNESIUM SULFATE IN WATER 40 MG/ML
2000 INJECTION, SOLUTION INTRAVENOUS
Status: COMPLETED | OUTPATIENT
Start: 2024-05-13 | End: 2024-05-13

## 2024-05-13 RX ORDER — POTASSIUM CHLORIDE 7.45 MG/ML
10 INJECTION INTRAVENOUS
Status: COMPLETED | OUTPATIENT
Start: 2024-05-13 | End: 2024-05-13

## 2024-05-13 RX ADMIN — IPRATROPIUM BROMIDE AND ALBUTEROL SULFATE 1 DOSE: 2.5; .5 SOLUTION RESPIRATORY (INHALATION) at 06:43

## 2024-05-13 RX ADMIN — IPRATROPIUM BROMIDE AND ALBUTEROL SULFATE 1 DOSE: 2.5; .5 SOLUTION RESPIRATORY (INHALATION) at 04:48

## 2024-05-13 RX ADMIN — POTASSIUM CHLORIDE 10 MEQ: 7.46 INJECTION, SOLUTION INTRAVENOUS at 06:21

## 2024-05-13 RX ADMIN — IPRATROPIUM BROMIDE AND ALBUTEROL SULFATE 1 DOSE: 2.5; .5 SOLUTION RESPIRATORY (INHALATION) at 04:53

## 2024-05-13 RX ADMIN — IPRATROPIUM BROMIDE AND ALBUTEROL SULFATE 1 DOSE: 2.5; .5 SOLUTION RESPIRATORY (INHALATION) at 05:53

## 2024-05-13 RX ADMIN — IPRATROPIUM BROMIDE AND ALBUTEROL SULFATE 1 DOSE: 2.5; .5 SOLUTION RESPIRATORY (INHALATION) at 05:58

## 2024-05-13 RX ADMIN — POTASSIUM BICARBONATE 40 MEQ: 782 TABLET, EFFERVESCENT ORAL at 07:31

## 2024-05-13 RX ADMIN — IPRATROPIUM BROMIDE AND ALBUTEROL SULFATE 1 DOSE: 2.5; .5 SOLUTION RESPIRATORY (INHALATION) at 04:58

## 2024-05-13 RX ADMIN — MAGNESIUM SULFATE HEPTAHYDRATE 2000 MG: 40 INJECTION, SOLUTION INTRAVENOUS at 05:28

## 2024-05-13 RX ADMIN — METHYLPREDNISOLONE SODIUM SUCCINATE 125 MG: 125 INJECTION INTRAMUSCULAR; INTRAVENOUS at 05:27

## 2024-05-13 RX ADMIN — IPRATROPIUM BROMIDE AND ALBUTEROL SULFATE 1 DOSE: 2.5; .5 SOLUTION RESPIRATORY (INHALATION) at 06:48

## 2024-05-13 RX ADMIN — IPRATROPIUM BROMIDE AND ALBUTEROL SULFATE 1 DOSE: 2.5; .5 SOLUTION RESPIRATORY (INHALATION) at 06:55

## 2024-05-13 RX ADMIN — IPRATROPIUM BROMIDE AND ALBUTEROL SULFATE 1 DOSE: 2.5; .5 SOLUTION RESPIRATORY (INHALATION) at 05:48

## 2024-05-13 NOTE — ED TRIAGE NOTES
Patient presents with a c/o difficulty breathing that began this morning. Patient states that she began to feel sick yesterday evening. Then woke up this morning with difficulty breathing. Patient reports history of COPD. Patient denies chest pain.

## 2024-05-13 NOTE — DISCHARGE INSTRUCTIONS
Thank you!    Thank you for allowing me to care for you in the emergency department.  I sincerely hope that you are satisfied with your visit today.  It is my goal to provide you with excellent care.    Below you will find a list of your labs and imaging from your visit today if applicable. Should you have any questions regarding these results please do not hesitate to call the emergency department. Please review [facility] for a more detailed result list since the below list may not be comprehensive. Instructions on how to sign up to [facility] should be provided in this packet.    Labs -  Recent Results (from the past 12 hour(s))   CBC with Auto Differential    Collection Time: 05/13/24  5:00 AM   Result Value Ref Range    WBC 4.5 3.6 - 11.0 K/uL    RBC 4.75 3.80 - 5.20 M/uL    Hemoglobin 13.3 11.5 - 16.0 g/dL    Hematocrit 40.0 35.0 - 47.0 %    MCV 84.2 80.0 - 99.0 FL    MCH 28.0 26.0 - 34.0 PG    MCHC 33.3 30.0 - 36.5 g/dL    RDW 15.3 (H) 11.5 - 14.5 %    Platelets 188 150 - 400 K/uL    MPV 9.9 8.9 - 12.9 FL    Nucleated RBCs 0.0 0.0  WBC    nRBC 0.00 0.00 - 0.01 K/uL    Neutrophils % 29 (L) 32 - 75 %    Lymphocytes % 55 (H) 12 - 49 %    Monocytes % 9 5 - 13 %    Eosinophils % 6 0 - 7 %    Basophils % 1 0 - 1 %    Immature Granulocytes % 0 0 - 0.5 %    Neutrophils Absolute 1.3 (L) 1.8 - 8.0 K/UL    Lymphocytes Absolute 2.5 0.8 - 3.5 K/UL    Monocytes Absolute 0.4 0.0 - 1.0 K/UL    Eosinophils Absolute 0.3 0.0 - 0.4 K/UL    Basophils Absolute 0.1 0.0 - 0.1 K/UL    Immature Granulocytes Absolute 0.0 0.00 - 0.04 K/UL    Differential Type AUTOMATED     Comprehensive Metabolic Panel    Collection Time: 05/13/24  5:00 AM   Result Value Ref Range    Sodium 143 136 - 145 mmol/L    Potassium 3.1 (L) 3.5 - 5.1 mmol/L    Chloride 108 97 - 108 mmol/L    CO2 23 21 - 32 mmol/L    Anion Gap 12 5 - 15 mmol/L    Glucose 116 (H) 65 - 100 mg/dL    BUN 16 6 - 20 mg/dL    Creatinine 1.11 (H) 0.55 - 1.02 mg/dL    Bun/Cre Ratio 14

## 2024-05-13 NOTE — ED PROVIDER NOTES
[facility]  EMERGENCY DEPARTMENT ENCOUNTER NOTE    Date: 5/13/2024  Patient Name: Giovanna Zepeda    History of Presenting Illness     Chief Complaint   Patient presents with    Shortness of Breath       History obtained from: Patient    HPI: Giovanna Zepeda, 63 y.o. female with past medical history as listed and reviewed below presents for shortness of breath.  She woke up today with wheezing, shortness of breath, and tachypnea.  She denies any chest pain.  No fevers or chills.  No runny nose, or sore throat.  No abdominal pain, nausea, or vomiting.  No fevers, chills, or other complaints.    Medical History   I reviewed the medical, surgical, family, and social history, as well as allergies:    PCP: Margie Angeles APRN - NP    Past Medical History:  Past Medical History:   Diagnosis Date    Hypercholesterolemia     Hypertension 12/10/2020    Hypokalemia 12/10/2020    Menopause     Personal history of colonic polyps 11/17/2021     Past Surgical History:  Past Surgical History:   Procedure Laterality Date    COLONOSCOPY N/A 12/3/2021    COLONOSCOPY (TIVA) performed by Betty Avalos MD at [facility] ENDOSCOPY    COLONOSCOPY N/A 3/8/2024    COLONOSCOPY BIOPSY performed by Betty Avalos Jr., MD at [facility] ENDOSCOPY    HYSTERECTOMY (CERVIX STATUS UNKNOWN)      partial     Current Outpatient Medications:  Current Outpatient Medications   Medication Instructions    albuterol sulfate HFA (PROVENTIL HFA) 108 (90 Base) MCG/ACT inhaler 2 puffs, Inhalation, EVERY 4 HOURS PRN    albuterol sulfate HFA (VENTOLIN HFA) 108 (90 Base) MCG/ACT inhaler 2 puffs, Inhalation, 4 TIMES DAILY PRN    amLODIPine (NORVASC) 10 mg, Oral, DAILY    atorvastatin (LIPITOR) 40 mg, Oral, DAILY    lisinopril-hydroCHLOROthiazide (PRINZIDE;ZESTORETIC) 20-25 MG per tablet 1 tablet, Oral, DAILY    potassium chloride (KLOR-CON M) 10 MEQ extended release tablet 10 mEq, Oral, DAILY    predniSONE (DELTASONE)

## 2024-05-14 ENCOUNTER — OFFICE VISIT (OUTPATIENT)
Facility: CLINIC | Age: 64
End: 2024-05-14
Payer: OTHER GOVERNMENT

## 2024-05-14 VITALS
HEIGHT: 64 IN | TEMPERATURE: 98.1 F | HEART RATE: 85 BPM | DIASTOLIC BLOOD PRESSURE: 90 MMHG | BODY MASS INDEX: 32.44 KG/M2 | RESPIRATION RATE: 20 BRPM | OXYGEN SATURATION: 93 % | WEIGHT: 190 LBS | SYSTOLIC BLOOD PRESSURE: 153 MMHG

## 2024-05-14 DIAGNOSIS — I10 PRIMARY HYPERTENSION: ICD-10-CM

## 2024-05-14 DIAGNOSIS — R11.0 NAUSEA: Primary | ICD-10-CM

## 2024-05-14 DIAGNOSIS — J44.1 COPD WITH ACUTE EXACERBATION (HCC): ICD-10-CM

## 2024-05-14 LAB
EKG ATRIAL RATE: 67 BPM
EKG DIAGNOSIS: NORMAL
EKG P AXIS: 28 DEGREES
EKG P-R INTERVAL: 190 MS
EKG Q-T INTERVAL: 488 MS
EKG QRS DURATION: 175 MS
EKG QTC CALCULATION (BAZETT): 516 MS
EKG R AXIS: -32 DEGREES
EKG T AXIS: 148 DEGREES
EKG VENTRICULAR RATE: 67 BPM

## 2024-05-14 PROCEDURE — 3080F DIAST BP >= 90 MM HG: CPT

## 2024-05-14 PROCEDURE — 96372 THER/PROPH/DIAG INJ SC/IM: CPT

## 2024-05-14 PROCEDURE — 99214 OFFICE O/P EST MOD 30 MIN: CPT

## 2024-05-14 PROCEDURE — 3077F SYST BP >= 140 MM HG: CPT

## 2024-05-14 RX ORDER — DEXAMETHASONE SODIUM PHOSPHATE 10 MG/ML
10 INJECTION INTRAMUSCULAR; INTRAVENOUS ONCE
Status: COMPLETED | OUTPATIENT
Start: 2024-05-14 | End: 2024-05-14

## 2024-05-14 RX ORDER — ONDANSETRON 4 MG/1
4 TABLET, ORALLY DISINTEGRATING ORAL 3 TIMES DAILY PRN
Qty: 21 TABLET | Refills: 0 | Status: SHIPPED | OUTPATIENT
Start: 2024-05-14

## 2024-05-14 RX ORDER — LISINOPRIL AND HYDROCHLOROTHIAZIDE 25; 20 MG/1; MG/1
1 TABLET ORAL DAILY
Qty: 90 TABLET | Refills: 1 | Status: SHIPPED | OUTPATIENT
Start: 2024-05-14

## 2024-05-14 RX ORDER — AMLODIPINE BESYLATE 10 MG/1
10 TABLET ORAL DAILY
Qty: 90 TABLET | Refills: 1 | Status: SHIPPED | OUTPATIENT
Start: 2024-05-14

## 2024-05-14 RX ORDER — IPRATROPIUM BROMIDE AND ALBUTEROL SULFATE 2.5; .5 MG/3ML; MG/3ML
1 SOLUTION RESPIRATORY (INHALATION) EVERY 4 HOURS PRN
Qty: 90 EACH | Refills: 2 | Status: SHIPPED | OUTPATIENT
Start: 2024-05-14

## 2024-05-14 RX ORDER — DEXTROMETHORPHAN HYDROBROMIDE AND PROMETHAZINE HYDROCHLORIDE 15; 6.25 MG/5ML; MG/5ML
5 SYRUP ORAL 2 TIMES DAILY PRN
Qty: 100 ML | Refills: 0 | Status: SHIPPED | OUTPATIENT
Start: 2024-05-14

## 2024-05-14 RX ORDER — ONDANSETRON 4 MG/1
4 TABLET, ORALLY DISINTEGRATING ORAL ONCE
Status: COMPLETED | OUTPATIENT
Start: 2024-05-14 | End: 2024-05-14

## 2024-05-14 RX ADMIN — ONDANSETRON 4 MG: 4 TABLET, ORALLY DISINTEGRATING ORAL at 11:55

## 2024-05-14 RX ADMIN — DEXAMETHASONE SODIUM PHOSPHATE 10 MG: 10 INJECTION INTRAMUSCULAR; INTRAVENOUS at 12:00

## 2024-05-14 ASSESSMENT — ENCOUNTER SYMPTOMS
CONSTIPATION: 0
SHORTNESS OF BREATH: 1
SINUS PRESSURE: 0
NAUSEA: 1
SORE THROAT: 0
WHEEZING: 1
COUGH: 1
EYES NEGATIVE: 1
DIARRHEA: 0

## 2024-05-14 NOTE — PROGRESS NOTES
Giovanna Zepeda is a 63 y.o. female who presents to the office today for the following:    Chief Complaint   Patient presents with    Cough       Past Medical History:   Diagnosis Date    Hypercholesterolemia     Hypertension 12/10/2020    Hypokalemia 12/10/2020    Menopause     Personal history of colonic polyps 11/17/2021        Past Surgical History:   Procedure Laterality Date    COLONOSCOPY N/A 12/3/2021    COLONOSCOPY (TIVA) performed by Betty Avalos MD at [facility] ENDOSCOPY    COLONOSCOPY N/A 3/8/2024    COLONOSCOPY BIOPSY performed by Betty Avalos Jr., MD at [facility] ENDOSCOPY    HYSTERECTOMY (CERVIX STATUS UNKNOWN)      partial        Family History   Problem Relation Age of Onset    Hypertension Mother     Diabetes Mother     Cancer Father     Breast Cancer Maternal Grandmother     Breast Cancer Paternal Grandmother         Social History     Tobacco Use    Smoking status: Every Day     Current packs/day: 1.00     Types: Cigarettes    Smokeless tobacco: Never   Vaping Use    Vaping Use: Never used   Substance Use Topics    Alcohol use: Not Currently    Drug use: Never        HPI  Patient here for [facility] ER follow up after being seen yesterday for SOB, wheezes and tachypnea. Denied CP or URI symptoms. Denies fever body aches or chills. EKG showed LBBB however troponins negative with trending. Rec'd dunebs in ER resulting in some improvement of symptoms. She was recommended for admission however patient cares for twins at home and could not afford admission.  Dispo'd to home with Albuterol INH and prednisone taper.     Today in office patient is wheezing with a dry cough. She also endorses nausea in office. She has had some intermittent nausea and headaches for past 3 days after symptom onset. Denies fever body aches or chills. Increased fatigue present. No nasal congestion or sore throat. No known sick contacts. States she has not started prednisone but is using inhaler which she thinks makes her cough

## 2024-11-07 ENCOUNTER — TELEPHONE (OUTPATIENT)
Facility: CLINIC | Age: 64
End: 2024-11-07

## 2024-11-07 ENCOUNTER — HOSPITAL ENCOUNTER (OUTPATIENT)
Facility: HOSPITAL | Age: 64
Discharge: HOME | End: 2024-11-09
Payer: OTHER GOVERNMENT

## 2024-11-07 ENCOUNTER — OFFICE VISIT (OUTPATIENT)
Facility: CLINIC | Age: 64
End: 2024-11-07
Payer: OTHER GOVERNMENT

## 2024-11-07 VITALS
RESPIRATION RATE: 18 BRPM | DIASTOLIC BLOOD PRESSURE: 81 MMHG | SYSTOLIC BLOOD PRESSURE: 138 MMHG | BODY MASS INDEX: 33.97 KG/M2 | TEMPERATURE: 98.2 F | OXYGEN SATURATION: 97 % | HEIGHT: 64 IN | WEIGHT: 199 LBS | HEART RATE: 73 BPM

## 2024-11-07 VITALS — WEIGHT: 199 LBS | BODY MASS INDEX: 33.97 KG/M2 | HEIGHT: 64 IN

## 2024-11-07 DIAGNOSIS — R06.02 SHORTNESS OF BREATH: ICD-10-CM

## 2024-11-07 DIAGNOSIS — J44.1 COPD WITH ACUTE EXACERBATION (HCC): Primary | ICD-10-CM

## 2024-11-07 DIAGNOSIS — I44.7 LBBB (LEFT BUNDLE BRANCH BLOCK): ICD-10-CM

## 2024-11-07 LAB
ECHO AO ROOT DIAM: 3.7 CM
ECHO AO ROOT INDEX: 1.9 CM/M2
ECHO AV AREA PEAK VELOCITY: 3.2 CM2
ECHO AV AREA VTI: 2.9 CM2
ECHO AV AREA/BSA PEAK VELOCITY: 1.6 CM2/M2
ECHO AV AREA/BSA VTI: 1.5 CM2/M2
ECHO AV MEAN GRADIENT: 4 MMHG
ECHO AV MEAN VELOCITY: 0.9 M/S
ECHO AV PEAK GRADIENT: 7 MMHG
ECHO AV PEAK VELOCITY: 1.3 M/S
ECHO AV VELOCITY RATIO: 0.77
ECHO AV VTI: 29.5 CM
ECHO BSA: 2.02 M2
ECHO LA DIAMETER INDEX: 2.31 CM/M2
ECHO LA DIAMETER: 4.5 CM
ECHO LA TO AORTIC ROOT RATIO: 1.22
ECHO LA VOL A-L A2C: 50 ML (ref 22–52)
ECHO LA VOL A-L A4C: 83 ML (ref 22–52)
ECHO LA VOL BP: 61 ML (ref 22–52)
ECHO LA VOL MOD A2C: 48 ML (ref 22–52)
ECHO LA VOL MOD A4C: 75 ML (ref 22–52)
ECHO LA VOL/BSA BIPLANE: 31 ML/M2 (ref 16–34)
ECHO LA VOLUME AREA LENGTH: 67 ML
ECHO LA VOLUME INDEX A-L A2C: 26 ML/M2 (ref 16–34)
ECHO LA VOLUME INDEX A-L A4C: 43 ML/M2 (ref 16–34)
ECHO LA VOLUME INDEX AREA LENGTH: 34 ML/M2 (ref 16–34)
ECHO LA VOLUME INDEX MOD A2C: 25 ML/M2 (ref 16–34)
ECHO LA VOLUME INDEX MOD A4C: 38 ML/M2 (ref 16–34)
ECHO LV E' LATERAL VELOCITY: 6.4 CM/S
ECHO LV E' SEPTAL VELOCITY: 5.1 CM/S
ECHO LV EDV A2C: 118 ML
ECHO LV EDV A4C: 140 ML
ECHO LV EDV BP: 130 ML (ref 56–104)
ECHO LV EDV INDEX A4C: 72 ML/M2
ECHO LV EDV INDEX BP: 67 ML/M2
ECHO LV EDV NDEX A2C: 61 ML/M2
ECHO LV EJECTION FRACTION A2C: 21 %
ECHO LV EJECTION FRACTION A4C: 22 %
ECHO LV EJECTION FRACTION BIPLANE: 19 % (ref 55–100)
ECHO LV ESV A2C: 93 ML
ECHO LV ESV A4C: 109 ML
ECHO LV ESV BP: 105 ML (ref 19–49)
ECHO LV ESV INDEX A2C: 48 ML/M2
ECHO LV ESV INDEX A4C: 56 ML/M2
ECHO LV ESV INDEX BP: 54 ML/M2
ECHO LV FRACTIONAL SHORTENING: 5 % (ref 28–44)
ECHO LV GLOBAL LONGITUDINAL STRAIN (GLS): -9.3 %
ECHO LV INTERNAL DIMENSION DIASTOLE INDEX: 3.13 CM/M2
ECHO LV INTERNAL DIMENSION DIASTOLIC: 6.1 CM (ref 3.9–5.3)
ECHO LV INTERNAL DIMENSION SYSTOLIC INDEX: 2.97 CM/M2
ECHO LV INTERNAL DIMENSION SYSTOLIC: 5.8 CM
ECHO LV IVSD: 1.3 CM (ref 0.6–0.9)
ECHO LV MASS 2D: 341 G (ref 67–162)
ECHO LV MASS INDEX 2D: 174.8 G/M2 (ref 43–95)
ECHO LV POSTERIOR WALL DIASTOLIC: 1.2 CM (ref 0.6–0.9)
ECHO LV RELATIVE WALL THICKNESS RATIO: 0.39
ECHO LVOT AREA: 4.2 CM2
ECHO LVOT AV VTI INDEX: 0.67
ECHO LVOT DIAM: 2.3 CM
ECHO LVOT MEAN GRADIENT: 2 MMHG
ECHO LVOT PEAK GRADIENT: 4 MMHG
ECHO LVOT PEAK VELOCITY: 1 M/S
ECHO LVOT STROKE VOLUME INDEX: 42.2 ML/M2
ECHO LVOT SV: 82.2 ML
ECHO LVOT VTI: 19.8 CM
ECHO MV A VELOCITY: 0.9 M/S
ECHO MV E DECELERATION TIME (DT): 187 MS
ECHO MV E VELOCITY: 0.63 M/S
ECHO MV E/A RATIO: 0.7
ECHO MV E/E' LATERAL: 9.84
ECHO MV E/E' RATIO (AVERAGED): 11.1
ECHO MV E/E' SEPTAL: 12.35
ECHO RA AREA 4C: 11.5 CM2
ECHO RV FREE WALL PEAK S': 11.2 CM/S
ECHO RV INTERNAL DIMENSION: 3 CM
ECHO RV TAPSE: 2 CM (ref 1.7–?)

## 2024-11-07 PROCEDURE — 99214 OFFICE O/P EST MOD 30 MIN: CPT | Performed by: NURSE PRACTITIONER

## 2024-11-07 PROCEDURE — C8929 TTE W OR WO FOL WCON,DOPPLER: HCPCS

## 2024-11-07 PROCEDURE — 6360000004 HC RX CONTRAST MEDICATION

## 2024-11-07 PROCEDURE — 3079F DIAST BP 80-89 MM HG: CPT | Performed by: NURSE PRACTITIONER

## 2024-11-07 PROCEDURE — 96372 THER/PROPH/DIAG INJ SC/IM: CPT | Performed by: NURSE PRACTITIONER

## 2024-11-07 PROCEDURE — 3075F SYST BP GE 130 - 139MM HG: CPT | Performed by: NURSE PRACTITIONER

## 2024-11-07 RX ORDER — AZITHROMYCIN 250 MG/1
TABLET, FILM COATED ORAL
Qty: 6 TABLET | Refills: 0 | Status: SHIPPED | OUTPATIENT
Start: 2024-11-07 | End: 2024-11-17

## 2024-11-07 RX ORDER — AMLODIPINE BESYLATE 10 MG/1
10 TABLET ORAL DAILY
Qty: 90 TABLET | Refills: 1 | Status: CANCELLED | OUTPATIENT
Start: 2024-11-07

## 2024-11-07 RX ORDER — AMLODIPINE BESYLATE 10 MG/1
10 TABLET ORAL DAILY
Qty: 90 TABLET | Refills: 1 | Status: SHIPPED | OUTPATIENT
Start: 2024-11-07

## 2024-11-07 RX ORDER — ATORVASTATIN CALCIUM 40 MG/1
40 TABLET, FILM COATED ORAL DAILY
Qty: 90 TABLET | Refills: 1 | Status: SHIPPED | OUTPATIENT
Start: 2024-11-07

## 2024-11-07 RX ORDER — PREDNISONE 20 MG/1
TABLET ORAL
Qty: 9 TABLET | Refills: 0 | Status: SHIPPED | OUTPATIENT
Start: 2024-11-07 | End: 2024-11-12

## 2024-11-07 RX ORDER — LISINOPRIL AND HYDROCHLOROTHIAZIDE 20; 25 MG/1; MG/1
1 TABLET ORAL DAILY
Qty: 90 TABLET | Refills: 1 | Status: SHIPPED | OUTPATIENT
Start: 2024-11-07

## 2024-11-07 RX ORDER — LISINOPRIL AND HYDROCHLOROTHIAZIDE 20; 25 MG/1; MG/1
1 TABLET ORAL DAILY
Qty: 90 TABLET | Refills: 1 | Status: CANCELLED | OUTPATIENT
Start: 2024-11-07

## 2024-11-07 RX ORDER — IPRATROPIUM BROMIDE AND ALBUTEROL SULFATE 2.5; .5 MG/3ML; MG/3ML
1 SOLUTION RESPIRATORY (INHALATION) EVERY 4 HOURS PRN
Qty: 90 EACH | Refills: 2 | Status: SHIPPED | OUTPATIENT
Start: 2024-11-07

## 2024-11-07 RX ORDER — DEXAMETHASONE SODIUM PHOSPHATE 10 MG/ML
10 INJECTION, SOLUTION INTRAMUSCULAR; INTRAVENOUS ONCE
Status: COMPLETED | OUTPATIENT
Start: 2024-11-07 | End: 2024-11-07

## 2024-11-07 RX ORDER — ALBUTEROL SULFATE 90 UG/1
2 INHALANT RESPIRATORY (INHALATION) EVERY 4 HOURS PRN
Qty: 18 G | Refills: 2 | Status: SHIPPED | OUTPATIENT
Start: 2024-11-07

## 2024-11-07 RX ADMIN — PERFLUTREN 1.5 ML: 6.52 INJECTION, SUSPENSION INTRAVENOUS at 16:00

## 2024-11-07 RX ADMIN — DEXAMETHASONE SODIUM PHOSPHATE 10 MG: 10 INJECTION, SOLUTION INTRAMUSCULAR; INTRAVENOUS at 12:04

## 2024-11-07 SDOH — ECONOMIC STABILITY: FOOD INSECURITY: WITHIN THE PAST 12 MONTHS, THE FOOD YOU BOUGHT JUST DIDN'T LAST AND YOU DIDN'T HAVE MONEY TO GET MORE.: SOMETIMES TRUE

## 2024-11-07 SDOH — ECONOMIC STABILITY: FOOD INSECURITY: WITHIN THE PAST 12 MONTHS, YOU WORRIED THAT YOUR FOOD WOULD RUN OUT BEFORE YOU GOT MONEY TO BUY MORE.: SOMETIMES TRUE

## 2024-11-07 SDOH — ECONOMIC STABILITY: INCOME INSECURITY: HOW HARD IS IT FOR YOU TO PAY FOR THE VERY BASICS LIKE FOOD, HOUSING, MEDICAL CARE, AND HEATING?: SOMEWHAT HARD

## 2024-11-07 ASSESSMENT — ENCOUNTER SYMPTOMS
SHORTNESS OF BREATH: 1
SINUS PAIN: 0
COUGH: 1
EYES NEGATIVE: 1
SORE THROAT: 0
DIARRHEA: 0
WHEEZING: 1
CONSTIPATION: 0
NAUSEA: 0
SINUS PRESSURE: 0
RHINORRHEA: 0

## 2024-11-07 NOTE — PROGRESS NOTES
Chief Complaint   Patient presents with    Wheezing    COPD     Pt stated she thinks her copd is acting up. Has been doing neb  treatments     Been going on for 3 weeks     Pt did not bring meds, went over list, pt confirmed     No other concerns     \"Have you been to the ER, urgent care clinic since your last visit?  Hospitalized since your last visit?\"    NO    “Have you seen or consulted any other health care providers outside our system since your last visit?”    No                      
tablet by mouth 3 times daily as needed for Nausea or Vomiting 21 tablet 0    promethazine-dextromethorphan (PROMETHAZINE-DM) 6.25-15 MG/5ML syrup Take 5 mLs by mouth 2 times daily as needed for Cough (Patient not taking: Reported on 11/7/2024) 100 mL 0    albuterol sulfate HFA (VENTOLIN HFA) 108 (90 Base) MCG/ACT inhaler Inhale 2 puffs into the lungs 4 times daily as needed for Wheezing (Patient not taking: Reported on 11/7/2024) 18 g 2    potassium chloride (KLOR-CON M) 10 MEQ extended release tablet Take 1 tablet by mouth daily 90 tablet 1     No current facility-administered medications on file prior to visit.        Current Outpatient Medications   Medication Sig Dispense Refill    ipratropium 0.5 mg-albuterol 2.5 mg (DUONEB) 0.5-2.5 (3) MG/3ML SOLN nebulizer solution Inhale 3 mLs into the lungs every 4 hours as needed for Shortness of Breath 90 each 2    albuterol sulfate HFA (PROVENTIL HFA) 108 (90 Base) MCG/ACT inhaler Inhale 2 puffs into the lungs every 4 hours as needed for Wheezing 18 g 2    lisinopril-hydroCHLOROthiazide (PRINZIDE;ZESTORETIC) 20-25 MG per tablet Take 1 tablet by mouth daily 90 tablet 1    atorvastatin (LIPITOR) 40 MG tablet Take 1 tablet by mouth daily 90 tablet 1    amLODIPine (NORVASC) 10 MG tablet Take 1 tablet by mouth daily 90 tablet 1    predniSONE (DELTASONE) 20 MG tablet Take 2 tablets by mouth daily for 3 days, THEN 1 tablet daily for 3 days. 9 tablet 0    azithromycin (ZITHROMAX) 250 MG tablet 500mg on day 1 followed by 250mg on days 2 - 5 6 tablet 0    ondansetron (ZOFRAN-ODT) 4 MG disintegrating tablet Take 1 tablet by mouth 3 times daily as needed for Nausea or Vomiting 21 tablet 0    promethazine-dextromethorphan (PROMETHAZINE-DM) 6.25-15 MG/5ML syrup Take 5 mLs by mouth 2 times daily as needed for Cough (Patient not taking: Reported on 11/7/2024) 100 mL 0    albuterol sulfate HFA (VENTOLIN HFA) 108 (90 Base) MCG/ACT inhaler Inhale 2 puffs into the lungs 4 times daily as

## 2024-11-08 ENCOUNTER — TELEPHONE (OUTPATIENT)
Facility: CLINIC | Age: 64
End: 2024-11-08

## 2024-11-08 NOTE — TELEPHONE ENCOUNTER
Pt requesting a work note from 11/6 until ? (She is unsure when you would like her to return to work) She goes to the cardiologist the 12th.

## 2024-11-20 ENCOUNTER — TELEPHONE (OUTPATIENT)
Age: 64
End: 2024-11-20

## 2024-11-20 NOTE — TELEPHONE ENCOUNTER
[facility] called to follow-up on referral for new pt.  Referral is in system.  [facility] can be reached at [phone].

## 2024-11-27 ENCOUNTER — TELEPHONE (OUTPATIENT)
Age: 64
End: 2024-11-27

## 2024-11-27 NOTE — TELEPHONE ENCOUNTER
----- Message from Dr. Rigoberto Tafoya MD sent at 11/26/2024 10:09 PM EST -----  Regarding: RE: Next week  Sure plwase put on dec 3  at 2pm  Thanks  Dr Tafoya  ----- Message -----  From: Katja Garcia MA  Sent: 11/26/2024   1:16 PM EST  To: Tali Fields RN; Rigoberto Tafoya MD  Subject: RE: Next week                                    This patient too Dr. Tafoya.  ----- Message -----  From: Tali Fields RN  Sent: 11/26/2024   1:05 PM EST  To: Katja Garcia MA  Subject: RE: Next week                                    Yes because she needs a lifevest!  ----- Message -----  From: Katja Garcia MA  Sent: 11/26/2024   9:43 AM EST  To: Tali Fields RN  Subject: Next week                                        Was also considering moving this patient up as she was having SOB. TTE on 11/7 LVEF was 19% .. I didn't see another echo in her chart. She saw [facility] on 11/12 and they ordered a stress test to be done on 12/3 .. Do you think we should bring her in before then or after?  I was thinking late next week like 12/5? To get results from [facility]?

## 2024-11-27 NOTE — TELEPHONE ENCOUNTER
Contacted patient again to advise of scheduled appointment.   Provided patient with address and appointment details. She does have a stress test at 10:45 am, but should be able to make it by 3 pm to see Dr. Tafoya

## 2024-11-29 ENCOUNTER — TELEPHONE (OUTPATIENT)
Age: 64
End: 2024-11-29

## 2024-12-03 ENCOUNTER — OFFICE VISIT (OUTPATIENT)
Age: 64
End: 2024-12-03
Payer: OTHER GOVERNMENT

## 2024-12-03 VITALS
TEMPERATURE: 97.5 F | SYSTOLIC BLOOD PRESSURE: 112 MMHG | BODY MASS INDEX: 33.57 KG/M2 | HEART RATE: 75 BPM | DIASTOLIC BLOOD PRESSURE: 86 MMHG | HEIGHT: 64 IN | RESPIRATION RATE: 20 BRPM | WEIGHT: 196.6 LBS | OXYGEN SATURATION: 96 %

## 2024-12-03 DIAGNOSIS — E78.00 HYPERCHOLESTEROLEMIA: ICD-10-CM

## 2024-12-03 DIAGNOSIS — I50.43 ACUTE ON CHRONIC COMBINED SYSTOLIC AND DIASTOLIC CONGESTIVE HEART FAILURE (HCC): Primary | ICD-10-CM

## 2024-12-03 DIAGNOSIS — I50.43 ACUTE ON CHRONIC COMBINED SYSTOLIC AND DIASTOLIC CONGESTIVE HEART FAILURE (HCC): ICD-10-CM

## 2024-12-03 PROCEDURE — 99204 OFFICE O/P NEW MOD 45 MIN: CPT | Performed by: INTERNAL MEDICINE

## 2024-12-03 PROCEDURE — 3079F DIAST BP 80-89 MM HG: CPT | Performed by: INTERNAL MEDICINE

## 2024-12-03 PROCEDURE — 3074F SYST BP LT 130 MM HG: CPT | Performed by: INTERNAL MEDICINE

## 2024-12-03 RX ORDER — CARVEDILOL 3.12 MG/1
3.12 TABLET ORAL 2 TIMES DAILY
Qty: 60 TABLET | Refills: 3 | Status: SHIPPED | OUTPATIENT
Start: 2024-12-03

## 2024-12-03 RX ORDER — CARVEDILOL 3.12 MG/1
3.12 TABLET ORAL 2 TIMES DAILY
Qty: 60 TABLET | Refills: 3 | Status: SHIPPED | OUTPATIENT
Start: 2024-12-03 | End: 2024-12-03 | Stop reason: SDUPTHER

## 2024-12-03 RX ORDER — ASPIRIN 81 MG/1
81 TABLET, CHEWABLE ORAL DAILY
COMMUNITY

## 2024-12-03 RX ORDER — FUROSEMIDE 20 MG/1
20 TABLET ORAL DAILY PRN
Qty: 60 TABLET | Refills: 3 | Status: SHIPPED | OUTPATIENT
Start: 2024-12-03

## 2024-12-03 ASSESSMENT — PATIENT HEALTH QUESTIONNAIRE - PHQ9
2. FEELING DOWN, DEPRESSED OR HOPELESS: SEVERAL DAYS
SUM OF ALL RESPONSES TO PHQ QUESTIONS 1-9: 1
SUM OF ALL RESPONSES TO PHQ QUESTIONS 1-9: 1
1. LITTLE INTEREST OR PLEASURE IN DOING THINGS: NOT AT ALL
SUM OF ALL RESPONSES TO PHQ QUESTIONS 1-9: 1
SUM OF ALL RESPONSES TO PHQ QUESTIONS 1-9: 1
SUM OF ALL RESPONSES TO PHQ9 QUESTIONS 1 & 2: 1

## 2024-12-03 NOTE — PROGRESS NOTES
07/16/18 1500   Exercise Tools   Exercise Tools Yes   Other Exercise Tool 1 (2# weight sh-elbow therex 2 sets 15)   Assessment   Treatment Assessment Pt tolerates therex in pm without complaints although R shoulder exercises difficult secondary to old dislocation  Plan   Treatment/Interventions ADL retraining;LE strengthening/ROM; Functional transfer training; Therapeutic exercise; Endurance training; Compensatory technique education;Patient/family training   OT Therapy Minutes   OT Time In 1500   OT Time Out 1530   OT Total Time (minutes) 30   OT Mode of treatment - Individual (minutes) (30)   Therapy Time missed   Time missed?  No motion.   Skin:     General: Skin is warm.   Neurological:      General: No focal deficit present.      Mental Status: She is alert and oriented to person, place, and time.   Psychiatric:         Mood and Affect: Mood normal.               PAST MEDICAL HISTORY:  Past Medical History:   Diagnosis Date    CHF (congestive heart failure) (HCC)     Hypercholesterolemia     Hypertension 12/10/2020    Hypokalemia 12/10/2020    Menopause     Personal history of colonic polyps 11/17/2021       PAST SURGICAL HISTORY:  Past Surgical History:   Procedure Laterality Date    COLONOSCOPY N/A 12/3/2021    COLONOSCOPY (TIVA) performed by Betty Avalos MD at [facility] ENDOSCOPY    COLONOSCOPY N/A 3/8/2024    COLONOSCOPY BIOPSY performed by Betty Avalos Jr., MD at [facility] ENDOSCOPY    HYSTERECTOMY (CERVIX STATUS UNKNOWN)      partial       FAMILY HISTORY:  Family History   Problem Relation Age of Onset    Hypertension Mother     Diabetes Mother     Cancer Father     Breast Cancer Maternal Grandmother     Breast Cancer Paternal Grandmother        SOCIAL HISTORY:  Social History     Socioeconomic History    Marital status:      Spouse name: Not on file    Number of children: Not on file    Years of education: Not on file    Highest education level: Not on file   Occupational History    Not on file   Tobacco Use    Smoking status: Every Day     Current packs/day: 1.00     Average packs/day: 1 pack/day for 34.9 years (34.9 ttl pk-yrs)     Types: Cigarettes     Start date: 1/10/1990    Smokeless tobacco: Never    Tobacco comments:     Trying to quit   Vaping Use    Vaping status: Never Used   Substance and Sexual Activity    Alcohol use: Not Currently    Drug use: Never    Sexual activity: Not Currently     Partners: Male   Other Topics Concern    Not on file   Social History Narrative    Not on file     Social Determinants of Health     Financial Resource Strain: Medium Risk (11/7/2024)    Overall Financial Resource Strain

## 2024-12-03 NOTE — PATIENT INSTRUCTIONS
Dear Giovanna,    Thank you for visiting my office today and for your commitment to improving your health. It was crucial to discuss your current health concerns and to establish a comprehensive plan for your treatment.    Following our consultation, here are the essential instructions and recommendations for your care plan:    - Medications:    - Begin taking Carvedilol 3.25 mg twice daily.    - start lasix 20mg daily as needed for weight gain of 3 lbs or more overnight or 5 lbs or more in one week, increased swelling, or increased shortness of breath.     - Discontinue Lisinopril Hydrochlorothiazide after 36 hours and start Entresto 24/26 twice a day .    - Spironolactone and Jardiance will be added during follow-up visits.  _ stop amlodipine    - Tests and Monitoring:    - Stop taking the potassium pill until we have reviewed your potassium levels.    - please call [phone] to schedule A cardiac MRI.   Genetic screening will cost $250 out-of-pocket if you decide to proceed.    - Continue to monitor your blood pressure and reduce your salt intake.    - Should your stress test results be abnormal, heart catheterization may be required.    - Lifestyle and Activity:    - Persist in your efforts to quit smoking.    - Utilize a fluid pill if you experience severe shortness of breath.    - Follow-Up Care:    - Regular visits are essential for monitoring your condition.    - Consider enrolling in a cardiac rehab program at [facility].    - Additional Advice:    - For your clogged ear, apply wax removal oil and refrain from using cotton swabs. Sleeping on the affected side may help alleviate the problem.    Please make sure to  your medications from [facility], as they will contact you with the cost details. We will also coordinate with [facility] for your medical records.    I look forward to seeing you at your follow-up visit. Do not hesitate to reach out if you have any questions or

## 2024-12-04 LAB
ANION GAP SERPL CALC-SCNC: 7 MMOL/L (ref 2–12)
BASOPHILS # BLD: 0.1 K/UL (ref 0–0.1)
BASOPHILS NFR BLD: 1 % (ref 0–1)
BUN SERPL-MCNC: 12 MG/DL (ref 6–20)
BUN/CREAT SERPL: 13 (ref 12–20)
CALCIUM SERPL-MCNC: 10.1 MG/DL (ref 8.5–10.1)
CHLORIDE SERPL-SCNC: 106 MMOL/L (ref 97–108)
CO2 SERPL-SCNC: 27 MMOL/L (ref 21–32)
CREAT SERPL-MCNC: 0.89 MG/DL (ref 0.55–1.02)
DIFFERENTIAL METHOD BLD: ABNORMAL
EOSINOPHIL # BLD: 0.2 K/UL (ref 0–0.4)
EOSINOPHIL NFR BLD: 5 % (ref 0–7)
ERYTHROCYTE [DISTWIDTH] IN BLOOD BY AUTOMATED COUNT: 14.4 % (ref 11.5–14.5)
GLUCOSE SERPL-MCNC: 105 MG/DL (ref 65–100)
HCT VFR BLD AUTO: 45.8 % (ref 35–47)
HGB BLD-MCNC: 14.8 G/DL (ref 11.5–16)
IMM GRANULOCYTES # BLD AUTO: 0 K/UL (ref 0–0.04)
IMM GRANULOCYTES NFR BLD AUTO: 0 % (ref 0–0.5)
LYMPHOCYTES # BLD: 2.3 K/UL (ref 0.8–3.5)
LYMPHOCYTES NFR BLD: 45 % (ref 12–49)
MCH RBC QN AUTO: 27.8 PG (ref 26–34)
MCHC RBC AUTO-ENTMCNC: 32.3 G/DL (ref 30–36.5)
MCV RBC AUTO: 85.9 FL (ref 80–99)
MONOCYTES # BLD: 0.6 K/UL (ref 0–1)
MONOCYTES NFR BLD: 11 % (ref 5–13)
NEUTS SEG # BLD: 1.9 K/UL (ref 1.8–8)
NEUTS SEG NFR BLD: 38 % (ref 32–75)
NRBC # BLD: 0 K/UL (ref 0–0.01)
NRBC BLD-RTO: 0 PER 100 WBC
NT PRO BNP: 192 PG/ML
PLATELET # BLD AUTO: 212 K/UL (ref 150–400)
PMV BLD AUTO: 10.9 FL (ref 8.9–12.9)
POTASSIUM SERPL-SCNC: 3.3 MMOL/L (ref 3.5–5.1)
RBC # BLD AUTO: 5.33 M/UL (ref 3.8–5.2)
SODIUM SERPL-SCNC: 140 MMOL/L (ref 136–145)
WBC # BLD AUTO: 5.1 K/UL (ref 3.6–11)

## 2024-12-05 NOTE — RESULT ENCOUNTER NOTE
Labs shows normal hemoglobin  Potassium low 3.3  Please restart potassium pills 10 mEq p.o. daily  Kidney function normal proBNP normal

## 2024-12-06 ENCOUNTER — TELEPHONE (OUTPATIENT)
Age: 64
End: 2024-12-06

## 2024-12-06 DIAGNOSIS — I50.22 CHRONIC SYSTOLIC HEART FAILURE (HCC): ICD-10-CM

## 2024-12-06 DIAGNOSIS — I50.43 ACUTE ON CHRONIC COMBINED SYSTOLIC AND DIASTOLIC CONGESTIVE HEART FAILURE (HCC): Primary | ICD-10-CM

## 2024-12-06 RX ORDER — POTASSIUM CHLORIDE 750 MG/1
10 TABLET, EXTENDED RELEASE ORAL DAILY
Qty: 30 TABLET | Refills: 3 | Status: SHIPPED | OUTPATIENT
Start: 2024-12-06

## 2024-12-06 NOTE — TELEPHONE ENCOUNTER
----- Message from Dr. Rigoberto Tafoya MD sent at 12/5/2024  3:00 PM EST -----  Labs shows normal hemoglobin  Potassium low 3.3  Please restart potassium pills 10 mEq p.o. daily  Kidney function normal proBNP normal    @ 1135 - spoke with Ms. Zepeda regarding the above results and recommendation for starting potassium 10 mEq daily. She verbalized understanding and did not state any questions.    She said that she attempted to schedule cMRI but no order. Order placed today. I encouraged her to call to schedule ASAP due to wait time for procedure.

## 2024-12-09 ENCOUNTER — TELEPHONE (OUTPATIENT)
Age: 64
End: 2024-12-09

## 2024-12-09 RX ORDER — POTASSIUM CHLORIDE 750 MG/1
10 TABLET, EXTENDED RELEASE ORAL DAILY
Qty: 30 TABLET | Refills: 1 | Status: SHIPPED | OUTPATIENT
Start: 2024-12-09

## 2024-12-09 NOTE — TELEPHONE ENCOUNTER
Patient called request potassium rx go to [facility] in [place]  (Original sent to mail order [facility])    Requested Prescriptions     Signed Prescriptions Disp Refills    potassium chloride (KLOR-CON M) 10 MEQ extended release tablet 30 tablet 1     Sig: Take 1 tablet by mouth daily     Authorizing Provider: CAROLINA PIEDRA     Ordering User: HEIDI LAFLEUR        Patient also needs to reschedule titration appt 12/18 as she has an appt with [facility] that day. RN did ask if patient would be willing to reschedule that appt instead as this was a med titration as Dr Pidera made a lot of med adjustments. She requested to reschedule [facility] appt still. Transferred patient to

## 2024-12-17 ENCOUNTER — TELEPHONE (OUTPATIENT)
Age: 64
End: 2024-12-17

## 2025-01-07 ENCOUNTER — HOSPITAL ENCOUNTER (OUTPATIENT)
Facility: HOSPITAL | Age: 65
Discharge: HOME | End: 2025-01-10
Payer: OTHER GOVERNMENT

## 2025-01-07 ENCOUNTER — HOSPITAL ENCOUNTER (OUTPATIENT)
Facility: HOSPITAL | Age: 65
Setting detail: SPECIMEN
Discharge: HOME | End: 2025-01-10
Payer: OTHER GOVERNMENT

## 2025-01-07 DIAGNOSIS — Z01.818 PRE-OP EXAMINATION: ICD-10-CM

## 2025-01-07 LAB
ALBUMIN SERPL-MCNC: 3.2 G/DL (ref 3.5–5)
ALBUMIN/GLOB SERPL: 0.9 (ref 1.1–2.2)
ALP SERPL-CCNC: 109 U/L (ref 45–117)
ALT SERPL-CCNC: 20 U/L (ref 12–78)
ANION GAP SERPL CALC-SCNC: 9 MMOL/L (ref 2–12)
APTT PPP: 27.9 SEC (ref 21.2–34.1)
AST SERPL W P-5'-P-CCNC: 14 U/L (ref 15–37)
BILIRUB SERPL-MCNC: 0.5 MG/DL (ref 0.2–1)
BUN SERPL-MCNC: 16 MG/DL (ref 6–20)
BUN/CREAT SERPL: 13 (ref 12–20)
CA-I BLD-MCNC: 9.3 MG/DL (ref 8.5–10.1)
CHLORIDE SERPL-SCNC: 106 MMOL/L (ref 97–108)
CO2 SERPL-SCNC: 27 MMOL/L (ref 21–32)
CREAT SERPL-MCNC: 1.27 MG/DL (ref 0.55–1.02)
EKG ATRIAL RATE: 71 BPM
EKG DIAGNOSIS: NORMAL
EKG P AXIS: 31 DEGREES
EKG P-R INTERVAL: 197 MS
EKG Q-T INTERVAL: 439 MS
EKG QRS DURATION: 167 MS
EKG QTC CALCULATION (BAZETT): 478 MS
EKG R AXIS: -31 DEGREES
EKG T AXIS: 140 DEGREES
EKG VENTRICULAR RATE: 71 BPM
ERYTHROCYTE [DISTWIDTH] IN BLOOD BY AUTOMATED COUNT: 13.9 % (ref 11.5–14.5)
GLOBULIN SER CALC-MCNC: 3.5 G/DL (ref 2–4)
GLUCOSE SERPL-MCNC: 145 MG/DL (ref 65–100)
HCT VFR BLD AUTO: 45.4 % (ref 35–47)
HGB BLD-MCNC: 15.3 G/DL (ref 11.5–16)
INR PPP: 1 (ref 0.9–1.1)
MCH RBC QN AUTO: 28.5 PG (ref 26–34)
MCHC RBC AUTO-ENTMCNC: 33.7 G/DL (ref 30–36.5)
MCV RBC AUTO: 84.5 FL (ref 80–99)
NRBC # BLD: 0 K/UL (ref 0–0.01)
NRBC BLD-RTO: 0 PER 100 WBC
PLATELET # BLD AUTO: 189 K/UL (ref 150–400)
PMV BLD AUTO: 10.3 FL (ref 8.9–12.9)
POTASSIUM SERPL-SCNC: 3.3 MMOL/L (ref 3.5–5.1)
PROT SERPL-MCNC: 6.7 G/DL (ref 6.4–8.2)
PROTHROMBIN TIME: 13.5 SEC (ref 11.9–14.6)
RBC # BLD AUTO: 5.37 M/UL (ref 3.8–5.2)
SODIUM SERPL-SCNC: 142 MMOL/L (ref 136–145)
THERAPEUTIC RANGE: NORMAL SEC (ref 82–109)
WBC # BLD AUTO: 4.5 K/UL (ref 3.6–11)

## 2025-01-07 PROCEDURE — 80053 COMPREHEN METABOLIC PANEL: CPT

## 2025-01-07 PROCEDURE — 85730 THROMBOPLASTIN TIME PARTIAL: CPT

## 2025-01-07 PROCEDURE — 85027 COMPLETE CBC AUTOMATED: CPT

## 2025-01-07 PROCEDURE — 85610 PROTHROMBIN TIME: CPT

## 2025-01-07 PROCEDURE — 93005 ELECTROCARDIOGRAM TRACING: CPT

## 2025-01-07 PROCEDURE — 36415 COLL VENOUS BLD VENIPUNCTURE: CPT

## 2025-01-07 NOTE — PERIOP NOTE
Dr. Sneed made aware of patient's abnormal labs CMP-- Potassium 3.3, Creatinine 1.27, GFR 48, and CBC-- RBC 5.37. No new orders received.

## 2025-01-08 ENCOUNTER — OFFICE VISIT (OUTPATIENT)
Facility: CLINIC | Age: 65
End: 2025-01-08
Payer: OTHER GOVERNMENT

## 2025-01-08 VITALS
DIASTOLIC BLOOD PRESSURE: 84 MMHG | TEMPERATURE: 97.9 F | HEIGHT: 64 IN | HEART RATE: 77 BPM | BODY MASS INDEX: 34.31 KG/M2 | RESPIRATION RATE: 17 BRPM | WEIGHT: 201 LBS | OXYGEN SATURATION: 98 % | SYSTOLIC BLOOD PRESSURE: 130 MMHG

## 2025-01-08 DIAGNOSIS — E78.2 MIXED HYPERLIPIDEMIA: ICD-10-CM

## 2025-01-08 DIAGNOSIS — R73.03 PREDIABETES: ICD-10-CM

## 2025-01-08 DIAGNOSIS — I50.22 CHRONIC HEART FAILURE WITH REDUCED EJECTION FRACTION (HFREF, <= 40%) (HCC): ICD-10-CM

## 2025-01-08 DIAGNOSIS — E87.6 HYPOKALEMIA: ICD-10-CM

## 2025-01-08 DIAGNOSIS — Z12.31 SCREENING MAMMOGRAM, ENCOUNTER FOR: Primary | ICD-10-CM

## 2025-01-08 DIAGNOSIS — I44.7 LBBB (LEFT BUNDLE BRANCH BLOCK): ICD-10-CM

## 2025-01-08 DIAGNOSIS — F17.200 TOBACCO DEPENDENCE: ICD-10-CM

## 2025-01-08 DIAGNOSIS — I10 PRIMARY HYPERTENSION: ICD-10-CM

## 2025-01-08 DIAGNOSIS — M1A.9XX0 CHRONIC GOUT WITHOUT TOPHUS, UNSPECIFIED CAUSE, UNSPECIFIED SITE: ICD-10-CM

## 2025-01-08 PROCEDURE — 3079F DIAST BP 80-89 MM HG: CPT | Performed by: NURSE PRACTITIONER

## 2025-01-08 PROCEDURE — 3075F SYST BP GE 130 - 139MM HG: CPT | Performed by: NURSE PRACTITIONER

## 2025-01-08 PROCEDURE — 99214 OFFICE O/P EST MOD 30 MIN: CPT | Performed by: NURSE PRACTITIONER

## 2025-01-08 RX ORDER — BUPROPION HYDROCHLORIDE 150 MG/1
150 TABLET, EXTENDED RELEASE ORAL 2 TIMES DAILY
Qty: 180 TABLET | Refills: 0 | Status: SHIPPED | OUTPATIENT
Start: 2025-01-08

## 2025-01-08 SDOH — ECONOMIC STABILITY: FOOD INSECURITY: WITHIN THE PAST 12 MONTHS, THE FOOD YOU BOUGHT JUST DIDN'T LAST AND YOU DIDN'T HAVE MONEY TO GET MORE.: SOMETIMES TRUE

## 2025-01-08 SDOH — ECONOMIC STABILITY: FOOD INSECURITY: WITHIN THE PAST 12 MONTHS, YOU WORRIED THAT YOUR FOOD WOULD RUN OUT BEFORE YOU GOT MONEY TO BUY MORE.: SOMETIMES TRUE

## 2025-01-08 ASSESSMENT — ENCOUNTER SYMPTOMS
DIARRHEA: 0
VOMITING: 0
WHEEZING: 1
CONSTIPATION: 0
PHOTOPHOBIA: 0
STRIDOR: 0
COLOR CHANGE: 0
CHOKING: 0
NAUSEA: 0
SINUS PAIN: 0
BLOOD IN STOOL: 0
EYE REDNESS: 0
SHORTNESS OF BREATH: 1
ABDOMINAL DISTENTION: 0
EYE DISCHARGE: 0
EYE PAIN: 0
TROUBLE SWALLOWING: 0
APNEA: 0
ABDOMINAL PAIN: 0
EYE ITCHING: 0
BACK PAIN: 0
FACIAL SWELLING: 0
SORE THROAT: 0

## 2025-01-08 ASSESSMENT — PATIENT HEALTH QUESTIONNAIRE - PHQ9
2. FEELING DOWN, DEPRESSED OR HOPELESS: NOT AT ALL
SUM OF ALL RESPONSES TO PHQ QUESTIONS 1-9: 0
SUM OF ALL RESPONSES TO PHQ QUESTIONS 1-9: 0
1. LITTLE INTEREST OR PLEASURE IN DOING THINGS: NOT AT ALL
SUM OF ALL RESPONSES TO PHQ QUESTIONS 1-9: 0
SUM OF ALL RESPONSES TO PHQ QUESTIONS 1-9: 0
SUM OF ALL RESPONSES TO PHQ9 QUESTIONS 1 & 2: 0

## 2025-01-08 NOTE — PROGRESS NOTES
Chief Complaint   Patient presents with    Hypertension     Follow up     Pt did not bring meds, went over list, pt confirmed     No other concerns     \"Have you been to the ER, urgent care clinic since your last visit?  Hospitalized since your last visit?\"    NO    “Have you seen or consulted any other health care providers outside our system since your last visit?”    NO             
visually estimated EF of 15 - 20%. EF by 2D Simpsons Biplane is 19%. Left ventricle is moderately dilated. Moderately increased wall thickness. Findings consistent with severe eccentric hypertrophy. Severe global hypokinesis present. Grade I diastolic dysfunction with normal LAP.    Mitral Valve: Mild to moderate regurgitation.    Left Atrium: Left atrium is mildly dilated.    Interatrial Septum: Agitated saline study was negative.    Image quality is good. Contrast used: Definity.    Signed by: Barney Sneed MD on 11/7/2024  8:00 PM   Reports GARDNER but no leg swelling, chest pain, palpitations etc.  On Entresto 24-26mg BID, Lasix 20mg daily, Carvedilol 3.125mg BID   She is scheduled for upcoming heart cath 1/16/25 and Cardiac MRI.  Continue care with [facility] who is managing.    10. LBBB (left bundle branch block)  See above.      Reviewed health maintenance items including recommended screenings and immunizations. She will obtain any desired vaccines at local pharmacy. Copy of recommended HM provided at discharge with summary.     Patient verbalizes understanding of plan of care as discussed above.    Return in about 3 months (around 4/8/2025), or if symptoms worsen or fail to improve.

## 2025-01-09 LAB
ALBUMIN SERPL-MCNC: 3.9 G/DL (ref 3.9–4.9)
ALP SERPL-CCNC: 112 IU/L (ref 44–121)
ALT SERPL-CCNC: 11 IU/L (ref 0–32)
APPEARANCE UR: CLEAR
AST SERPL-CCNC: 11 IU/L (ref 0–40)
BACTERIA #/AREA URNS HPF: NORMAL /[HPF]
BASOPHILS # BLD AUTO: 0.1 X10E3/UL (ref 0–0.2)
BASOPHILS NFR BLD AUTO: 1 %
BILIRUB SERPL-MCNC: 0.4 MG/DL (ref 0–1.2)
BILIRUB UR QL STRIP: NEGATIVE
BUN SERPL-MCNC: 12 MG/DL (ref 8–27)
BUN/CREAT SERPL: 12 (ref 12–28)
CALCIUM SERPL-MCNC: 9.5 MG/DL (ref 8.7–10.3)
CASTS URNS QL MICRO: NORMAL /LPF
CHLORIDE SERPL-SCNC: 107 MMOL/L (ref 96–106)
CHOLEST SERPL-MCNC: 212 MG/DL (ref 100–199)
CO2 SERPL-SCNC: 22 MMOL/L (ref 20–29)
COLOR UR: YELLOW
CREAT SERPL-MCNC: 0.97 MG/DL (ref 0.57–1)
EGFRCR SERPLBLD CKD-EPI 2021: 66 ML/MIN/1.73
EOSINOPHIL # BLD AUTO: 0.2 X10E3/UL (ref 0–0.4)
EOSINOPHIL NFR BLD AUTO: 6 %
EPI CELLS #/AREA URNS HPF: NORMAL /HPF (ref 0–10)
ERYTHROCYTE [DISTWIDTH] IN BLOOD BY AUTOMATED COUNT: 13.4 % (ref 11.7–15.4)
GLOBULIN SER CALC-MCNC: 2.4 G/DL (ref 1.5–4.5)
GLUCOSE SERPL-MCNC: 112 MG/DL (ref 70–99)
GLUCOSE UR QL STRIP: NEGATIVE
HBA1C MFR BLD: 6.2 % (ref 4.8–5.6)
HCT VFR BLD AUTO: 47.3 % (ref 34–46.6)
HDLC SERPL-MCNC: 51 MG/DL
HGB BLD-MCNC: 15.3 G/DL (ref 11.1–15.9)
HGB UR QL STRIP: ABNORMAL
IMM GRANULOCYTES # BLD AUTO: 0 X10E3/UL (ref 0–0.1)
IMM GRANULOCYTES NFR BLD AUTO: 0 %
KETONES UR QL STRIP: NEGATIVE
LDLC SERPL CALC-MCNC: 142 MG/DL (ref 0–99)
LEUKOCYTE ESTERASE UR QL STRIP: NEGATIVE
LYMPHOCYTES # BLD AUTO: 1.8 X10E3/UL (ref 0.7–3.1)
LYMPHOCYTES NFR BLD AUTO: 50 %
MCH RBC QN AUTO: 28.2 PG (ref 26.6–33)
MCHC RBC AUTO-ENTMCNC: 32.3 G/DL (ref 31.5–35.7)
MCV RBC AUTO: 87 FL (ref 79–97)
MICRO URNS: ABNORMAL
MONOCYTES # BLD AUTO: 0.4 X10E3/UL (ref 0.1–0.9)
MONOCYTES NFR BLD AUTO: 10 %
NEUTROPHILS # BLD AUTO: 1.2 X10E3/UL (ref 1.4–7)
NEUTROPHILS NFR BLD AUTO: 33 %
NITRITE UR QL STRIP: NEGATIVE
PH UR STRIP: 5.5 [PH] (ref 5–7.5)
PLATELET # BLD AUTO: 200 X10E3/UL (ref 150–450)
POTASSIUM SERPL-SCNC: 3.9 MMOL/L (ref 3.5–5.2)
PROT SERPL-MCNC: 6.3 G/DL (ref 6–8.5)
PROT UR QL STRIP: NEGATIVE
RBC # BLD AUTO: 5.43 X10E6/UL (ref 3.77–5.28)
RBC #/AREA URNS HPF: NORMAL /HPF (ref 0–2)
SODIUM SERPL-SCNC: 143 MMOL/L (ref 134–144)
SP GR UR STRIP: 1.02 (ref 1–1.03)
TRIGL SERPL-MCNC: 106 MG/DL (ref 0–149)
TSH SERPL DL<=0.005 MIU/L-ACNC: 1.3 UIU/ML (ref 0.45–4.5)
UROBILINOGEN UR STRIP-MCNC: 0.2 MG/DL (ref 0.2–1)
VLDLC SERPL CALC-MCNC: 19 MG/DL (ref 5–40)
WBC # BLD AUTO: 3.6 X10E3/UL (ref 3.4–10.8)
WBC #/AREA URNS HPF: NORMAL /HPF (ref 0–5)

## 2025-01-16 ENCOUNTER — HOSPITAL ENCOUNTER (OUTPATIENT)
Facility: HOSPITAL | Age: 65
Discharge: HOME | End: 2025-01-16
Attending: STUDENT IN AN ORGANIZED HEALTH CARE EDUCATION/TRAINING PROGRAM | Admitting: STUDENT IN AN ORGANIZED HEALTH CARE EDUCATION/TRAINING PROGRAM
Payer: OTHER GOVERNMENT

## 2025-01-16 VITALS
BODY MASS INDEX: 31.76 KG/M2 | WEIGHT: 186 LBS | SYSTOLIC BLOOD PRESSURE: 137 MMHG | HEART RATE: 63 BPM | TEMPERATURE: 97.6 F | HEIGHT: 64 IN | RESPIRATION RATE: 18 BRPM | DIASTOLIC BLOOD PRESSURE: 81 MMHG | OXYGEN SATURATION: 99 %

## 2025-01-16 DIAGNOSIS — R06.02 SHORTNESS OF BREATH: ICD-10-CM

## 2025-01-16 DIAGNOSIS — I20.89 ATYPICAL ANGINA (HCC): Primary | ICD-10-CM

## 2025-01-16 PROBLEM — R06.00 DYSPNEA: Status: ACTIVE | Noted: 2025-01-16

## 2025-01-16 PROCEDURE — 2709999900 HC NON-CHARGEABLE SUPPLY: Performed by: STUDENT IN AN ORGANIZED HEALTH CARE EDUCATION/TRAINING PROGRAM

## 2025-01-16 PROCEDURE — C1769 GUIDE WIRE: HCPCS | Performed by: STUDENT IN AN ORGANIZED HEALTH CARE EDUCATION/TRAINING PROGRAM

## 2025-01-16 PROCEDURE — 7100000011 HC PHASE II RECOVERY - ADDTL 15 MIN: Performed by: STUDENT IN AN ORGANIZED HEALTH CARE EDUCATION/TRAINING PROGRAM

## 2025-01-16 PROCEDURE — 2580000003 HC RX 258: Performed by: STUDENT IN AN ORGANIZED HEALTH CARE EDUCATION/TRAINING PROGRAM

## 2025-01-16 PROCEDURE — 76937 US GUIDE VASCULAR ACCESS: CPT | Performed by: STUDENT IN AN ORGANIZED HEALTH CARE EDUCATION/TRAINING PROGRAM

## 2025-01-16 PROCEDURE — 93458 L HRT ARTERY/VENTRICLE ANGIO: CPT | Performed by: STUDENT IN AN ORGANIZED HEALTH CARE EDUCATION/TRAINING PROGRAM

## 2025-01-16 PROCEDURE — C1894 INTRO/SHEATH, NON-LASER: HCPCS | Performed by: STUDENT IN AN ORGANIZED HEALTH CARE EDUCATION/TRAINING PROGRAM

## 2025-01-16 PROCEDURE — 6360000002 HC RX W HCPCS: Performed by: STUDENT IN AN ORGANIZED HEALTH CARE EDUCATION/TRAINING PROGRAM

## 2025-01-16 PROCEDURE — 7100000000 HC PACU RECOVERY - FIRST 15 MIN: Performed by: STUDENT IN AN ORGANIZED HEALTH CARE EDUCATION/TRAINING PROGRAM

## 2025-01-16 PROCEDURE — 7100000001 HC PACU RECOVERY - ADDTL 15 MIN: Performed by: STUDENT IN AN ORGANIZED HEALTH CARE EDUCATION/TRAINING PROGRAM

## 2025-01-16 PROCEDURE — 6360000004 HC RX CONTRAST MEDICATION: Performed by: STUDENT IN AN ORGANIZED HEALTH CARE EDUCATION/TRAINING PROGRAM

## 2025-01-16 PROCEDURE — 6370000000 HC RX 637 (ALT 250 FOR IP): Performed by: STUDENT IN AN ORGANIZED HEALTH CARE EDUCATION/TRAINING PROGRAM

## 2025-01-16 PROCEDURE — 7100000010 HC PHASE II RECOVERY - FIRST 15 MIN: Performed by: STUDENT IN AN ORGANIZED HEALTH CARE EDUCATION/TRAINING PROGRAM

## 2025-01-16 PROCEDURE — 99153 MOD SED SAME PHYS/QHP EA: CPT | Performed by: STUDENT IN AN ORGANIZED HEALTH CARE EDUCATION/TRAINING PROGRAM

## 2025-01-16 PROCEDURE — 2500000003 HC RX 250 WO HCPCS: Performed by: STUDENT IN AN ORGANIZED HEALTH CARE EDUCATION/TRAINING PROGRAM

## 2025-01-16 PROCEDURE — 99152 MOD SED SAME PHYS/QHP 5/>YRS: CPT | Performed by: STUDENT IN AN ORGANIZED HEALTH CARE EDUCATION/TRAINING PROGRAM

## 2025-01-16 RX ORDER — MIDAZOLAM HYDROCHLORIDE 1 MG/ML
INJECTION, SOLUTION INTRAMUSCULAR; INTRAVENOUS PRN
Status: DISCONTINUED | OUTPATIENT
Start: 2025-01-16 | End: 2025-01-16 | Stop reason: HOSPADM

## 2025-01-16 RX ORDER — ACETAMINOPHEN 500 MG
1000 TABLET ORAL EVERY 6 HOURS PRN
Status: DISCONTINUED | OUTPATIENT
Start: 2025-01-16 | End: 2025-01-16 | Stop reason: HOSPADM

## 2025-01-16 RX ORDER — SODIUM CHLORIDE 0.9 % (FLUSH) 0.9 %
5-40 SYRINGE (ML) INJECTION PRN
Status: DISCONTINUED | OUTPATIENT
Start: 2025-01-16 | End: 2025-01-16 | Stop reason: HOSPADM

## 2025-01-16 RX ORDER — VERAPAMIL HYDROCHLORIDE 2.5 MG/ML
INJECTION, SOLUTION INTRAVENOUS PRN
Status: DISCONTINUED | OUTPATIENT
Start: 2025-01-16 | End: 2025-01-16 | Stop reason: HOSPADM

## 2025-01-16 RX ORDER — SODIUM CHLORIDE 9 MG/ML
INJECTION, SOLUTION INTRAVENOUS PRN
Status: DISCONTINUED | OUTPATIENT
Start: 2025-01-16 | End: 2025-01-16 | Stop reason: HOSPADM

## 2025-01-16 RX ORDER — HEPARIN SODIUM 200 [USP'U]/100ML
INJECTION, SOLUTION INTRAVENOUS CONTINUOUS PRN
Status: COMPLETED | OUTPATIENT
Start: 2025-01-16 | End: 2025-01-16

## 2025-01-16 RX ORDER — SODIUM CHLORIDE 0.9 % (FLUSH) 0.9 %
5-40 SYRINGE (ML) INJECTION EVERY 12 HOURS SCHEDULED
Status: DISCONTINUED | OUTPATIENT
Start: 2025-01-16 | End: 2025-01-16 | Stop reason: HOSPADM

## 2025-01-16 RX ORDER — POTASSIUM CHLORIDE 750 MG/1
10 TABLET, EXTENDED RELEASE ORAL DAILY
Qty: 90 TABLET | Refills: 1 | Status: SHIPPED | OUTPATIENT
Start: 2025-01-16

## 2025-01-16 RX ORDER — FENTANYL CITRATE 50 UG/ML
INJECTION, SOLUTION INTRAMUSCULAR; INTRAVENOUS PRN
Status: DISCONTINUED | OUTPATIENT
Start: 2025-01-16 | End: 2025-01-16 | Stop reason: HOSPADM

## 2025-01-16 RX ORDER — LIDOCAINE HYDROCHLORIDE 10 MG/ML
INJECTION, SOLUTION INFILTRATION; PERINEURAL PRN
Status: DISCONTINUED | OUTPATIENT
Start: 2025-01-16 | End: 2025-01-16 | Stop reason: HOSPADM

## 2025-01-16 RX ORDER — ACETAMINOPHEN 325 MG/1
650 TABLET ORAL EVERY 4 HOURS PRN
Status: DISCONTINUED | OUTPATIENT
Start: 2025-01-16 | End: 2025-01-16

## 2025-01-16 RX ORDER — 0.9 % SODIUM CHLORIDE 0.9 %
INTRAVENOUS SOLUTION INTRAVENOUS CONTINUOUS PRN
Status: COMPLETED | OUTPATIENT
Start: 2025-01-16 | End: 2025-01-16

## 2025-01-16 RX ORDER — HEPARIN SODIUM 1000 [USP'U]/ML
INJECTION, SOLUTION INTRAVENOUS; SUBCUTANEOUS PRN
Status: DISCONTINUED | OUTPATIENT
Start: 2025-01-16 | End: 2025-01-16 | Stop reason: HOSPADM

## 2025-01-16 RX ORDER — IOPAMIDOL 755 MG/ML
INJECTION, SOLUTION INTRAVASCULAR PRN
Status: DISCONTINUED | OUTPATIENT
Start: 2025-01-16 | End: 2025-01-16 | Stop reason: HOSPADM

## 2025-01-16 RX ADMIN — ACETAMINOPHEN 1000 MG: 500 TABLET ORAL at 12:31

## 2025-01-16 ASSESSMENT — PAIN DESCRIPTION - DESCRIPTORS: DESCRIPTORS: ACHING

## 2025-01-16 ASSESSMENT — PAIN - FUNCTIONAL ASSESSMENT
PAIN_FUNCTIONAL_ASSESSMENT: 0-10
PAIN_FUNCTIONAL_ASSESSMENT: NONE - DENIES PAIN

## 2025-01-16 ASSESSMENT — PAIN DESCRIPTION - ORIENTATION: ORIENTATION: RIGHT

## 2025-01-16 NOTE — DISCHARGE INSTRUCTIONS
softener of laxative.    Care of the catheter site  For 1 or 2 days, keep a bandage over the spot where the catheter(s) was inserted.  The bandage probably will fall off in this time.  Put ice or a cold pack on the area for 10 to 20 minutes at a time to help with soreness of swelling.  Place a thin cloth between the ice and your skin.  You may shower 24 to 48 hours after the procedure, if your doctor says it is okay.  Pat the incision dry with a clean towel afterwards.  Do not soak the catheter site until it is healed.  Don't take a bath for 1 week, or until your doctor tells you it is okay to do so.  The use of lotions and powders is not recommended at the site until it is completely healed.  Watch for bleeding from the site.  A small amount of blood (up to the size of a quarter) on the bandage can be normal.  If you cough, sneeze, or laugh vigorously, apply direct pressure with your hand over the catheter site.  If you notice a little bit of blood from the catheter site (similar to a paper cut or shaving nick), lie down and press firmly on the area for 15 minutes to try and make it stop bleeding.  If the bleeding does not stop, call your doctor or seek immediate medical care.  If you notice heavy bleeding at the site that has either saturated the bandage or is squirting, lie down, press firmly on the site, and have someone call 911.    Follow-up care is a key part of your treatment and safety.  Be sure to make and go to all appointments and call your doctor if you are having problems.  It's also a good idea to know your test results and keep a list of medicines you take.    When should you call for help?    Call 911 anytime you think you may need emergency care.    For example, call if:  You passed out (lost consciousness).  You have severe trouble breathing.  You have sudden chest pain and shortness of breath, or you cough up blood.  Call 911 if you have symptoms of a heart attack, such as:  Chest Pain, pressure,

## 2025-01-16 NOTE — PERIOP NOTE
Patient states okay to review and give discharge instructions to Sandra Espinosa or Samson Thomas - Sister.

## 2025-01-16 NOTE — PROGRESS NOTES
Right wrist clean dry intact.   Discomfort in right shoulder down to 2/10 due to positioning during LHC.     Patient tolerated both drink/snack prior to discharge.     Follow up reviewed and placed on DC paperwork.     Patient used restroom without difficulty.

## 2025-01-17 NOTE — PROGRESS NOTES
Post Cardiac Cath Follow Up Phone Call    Are you having pain today?    No    Have you had any bruising/bleeding/oozing/swelling at the cath site?    No     Are you having any numbness or tingling in the fingers or hands?    No     Reinforced compliance with prescribed medications, discharge instructions, and follow up appointment. Patient verbalized understanding.    Do you have any additional questions or concerns?   No

## 2025-01-21 ENCOUNTER — TELEPHONE (OUTPATIENT)
Age: 65
End: 2025-01-21

## 2025-01-21 NOTE — TELEPHONE ENCOUNTER
Pt called to check on next appt date.  I reminded her that she cancelled her titration visit and that we needed to reschedule that.  She was able to do tomorrow, Wed, 1/22/25 at 10:30 a.m., so she's on the schedule then.     She confirmed the appt at the same time, and we reviewed appt details.

## 2025-01-27 ENCOUNTER — TELEPHONE (OUTPATIENT)
Age: 65
End: 2025-01-27

## 2025-01-27 NOTE — TELEPHONE ENCOUNTER
Telephone Call RE:  Appointment reminder     Outcome:     [x] Patient confirmed appointment   [] Patient rescheduled appointment for    [] Unable to reach  [] Left message              [x] Other: Parking, Location         
yes

## 2025-01-29 ENCOUNTER — OFFICE VISIT (OUTPATIENT)
Age: 65
End: 2025-01-29
Payer: OTHER GOVERNMENT

## 2025-01-29 VITALS
SYSTOLIC BLOOD PRESSURE: 158 MMHG | HEART RATE: 82 BPM | TEMPERATURE: 97.2 F | OXYGEN SATURATION: 95 % | WEIGHT: 202.4 LBS | BODY MASS INDEX: 34.56 KG/M2 | RESPIRATION RATE: 18 BRPM | DIASTOLIC BLOOD PRESSURE: 108 MMHG | HEIGHT: 64 IN

## 2025-01-29 DIAGNOSIS — Z79.899 ENCOUNTER FOR MONITORING DIURETIC THERAPY: ICD-10-CM

## 2025-01-29 DIAGNOSIS — I42.8 NONISCHEMIC CARDIOMYOPATHY (HCC): Primary | ICD-10-CM

## 2025-01-29 DIAGNOSIS — Z51.81 ENCOUNTER FOR MONITORING DIURETIC THERAPY: ICD-10-CM

## 2025-01-29 DIAGNOSIS — E55.9 VITAMIN D DEFICIENCY: ICD-10-CM

## 2025-01-29 PROBLEM — J44.9 COPD (CHRONIC OBSTRUCTIVE PULMONARY DISEASE) (HCC): Status: ACTIVE | Noted: 2025-01-29

## 2025-01-29 PROCEDURE — 3080F DIAST BP >= 90 MM HG: CPT

## 2025-01-29 PROCEDURE — 3077F SYST BP >= 140 MM HG: CPT

## 2025-01-29 PROCEDURE — 99215 OFFICE O/P EST HI 40 MIN: CPT

## 2025-01-29 RX ORDER — FUROSEMIDE 20 MG/1
20 TABLET ORAL DAILY PRN
Qty: 60 TABLET | Refills: 3 | Status: SHIPPED | OUTPATIENT
Start: 2025-01-29 | End: 2025-01-29

## 2025-01-29 RX ORDER — CARVEDILOL 3.12 MG/1
3.12 TABLET ORAL 2 TIMES DAILY
Qty: 60 TABLET | Refills: 3 | Status: SHIPPED | OUTPATIENT
Start: 2025-01-29

## 2025-01-29 RX ORDER — FUROSEMIDE 20 MG/1
20 TABLET ORAL DAILY
Qty: 60 TABLET | Refills: 3 | Status: SHIPPED | OUTPATIENT
Start: 2025-01-29

## 2025-01-29 RX ORDER — POTASSIUM CHLORIDE 750 MG/1
10 TABLET, EXTENDED RELEASE ORAL DAILY
Qty: 90 TABLET | Refills: 1 | Status: SHIPPED | OUTPATIENT
Start: 2025-01-29

## 2025-01-29 RX ORDER — DAPAGLIFLOZIN 10 MG/1
10 TABLET, FILM COATED ORAL EVERY MORNING
Qty: 90 TABLET | Refills: 0 | Status: SHIPPED | OUTPATIENT
Start: 2025-01-29

## 2025-01-29 RX ORDER — SPIRONOLACTONE 25 MG/1
25 TABLET ORAL DAILY
Qty: 90 TABLET | Refills: 1 | Status: SHIPPED | OUTPATIENT
Start: 2025-01-29

## 2025-01-29 ASSESSMENT — PATIENT HEALTH QUESTIONNAIRE - PHQ9
SUM OF ALL RESPONSES TO PHQ QUESTIONS 1-9: 2
SUM OF ALL RESPONSES TO PHQ QUESTIONS 1-9: 2
2. FEELING DOWN, DEPRESSED OR HOPELESS: SEVERAL DAYS
SUM OF ALL RESPONSES TO PHQ9 QUESTIONS 1 & 2: 2
SUM OF ALL RESPONSES TO PHQ QUESTIONS 1-9: 2
1. LITTLE INTEREST OR PLEASURE IN DOING THINGS: SEVERAL DAYS
SUM OF ALL RESPONSES TO PHQ QUESTIONS 1-9: 2

## 2025-01-29 NOTE — PATIENT INSTRUCTIONS
-Medication Changes:    INCREASE Entresto 49-51 mg. Take 1 tablet 2 times a day  CHANGE Lasix 20 mg. Take 1 tablet once daily  START Farxiga 10 mg. Take 1 tablet daily  START Spironolactone 25 mg. Take 1 tablet daily    Contact [facility] in 1 week with your daily weights, blood pressures and any symptoms (by MyChart -preferable- or telephone). If you leave a voicemail, please report this information in detail so we can follow up appropriately and efficiently. Be reassured, we will call you back. Phone number is [phone] option 2    Please take this to your pharmacy to notify them of the change in medications.     Testing Ordered:    LABS -   Lab work has been ordered. Please present to a [facility] ([facility]) location of your choice with the orders provided to have lab work done 2 WEEKS. If results are normal you will just receive a message through my chart.       Referrals:  [facility]  [address] [place] [address]  Phone: [phone]  -Internal Cardioverter Device eval    ZOLL   -LifeVest   -will contact you today or in the morning    Other Recommendations:      - Record blood pressure and heart rate 2 times daily: before medication and two hours after medication  - Record weight daily upon waking/after using the bathroom.   - Keep a written records of your weights and blood pressure and bring to your next appointment.   - Call the clinic at if you have a weight gain of 3 or more pounds overnight OR 5 or more pounds in one week, new/worsened shortness of breath or swelling, or if your blood pressure begins to consistently run below 90/60 and/or you begin to experience dizziness or lightheadedness. Our office phone number [phone] option 2.  - Drink an adequate amount of water with a goal of 6-8 eight ounce glasses (1.5-2 liters) of fluid daily. Your urine should be clear and light yellow straw colored.       Follow up     1 MONTH TITRATION FOLLOW

## 2025-01-29 NOTE — PROGRESS NOTES
(HCC)     Hypercholesterolemia     Hypertension 12/10/2020    Hypokalemia 12/10/2020    Menopause     Personal history of colonic polyps 11/17/2021       PAST SURGICAL HISTORY:  Past Surgical History:   Procedure Laterality Date    CARDIAC PROCEDURE N/A 1/16/2025    Left heart cath / coronary angiography performed by Barney Sneed MD at [facility] CARDIAC CATH LAB    COLONOSCOPY N/A 12/3/2021    COLONOSCOPY (TIVA) performed by Betty Avalos MD at [facility] ENDOSCOPY    COLONOSCOPY N/A 3/8/2024    COLONOSCOPY BIOPSY performed by Betty Avalos Jr., MD at [facility] ENDOSCOPY    HYSTERECTOMY (CERVIX STATUS UNKNOWN)      partial    INVASIVE VASCULAR N/A 1/16/2025    Ultrasound guided vascular access performed by Barney Sneed MD at [facility] CARDIAC CATH LAB       FAMILY HISTORY:  Family History   Problem Relation Age of Onset    Hypertension Mother     Diabetes Mother     Cancer Father     Breast Cancer Maternal Grandmother     Breast Cancer Paternal Grandmother        SOCIAL HISTORY:  Social History     Socioeconomic History    Marital status:      Spouse name: Not on file    Number of children: Not on file    Years of education: Not on file    Highest education level: Not on file   Occupational History    Not on file   Tobacco Use    Smoking status: Every Day     Current packs/day: 1.00     Average packs/day: 1 pack/day for 35.1 years (35.1 ttl pk-yrs)     Types: Cigarettes     Start date: 1/10/1990    Smokeless tobacco: Never    Tobacco comments:     Trying to quit   Vaping Use    Vaping status: Never Used   Substance and Sexual Activity    Alcohol use: Not Currently    Drug use: Never    Sexual activity: Not Currently     Partners: Male   Other Topics Concern    Not on file   Social History Narrative    Not on file     Social Determinants of Health     Financial Resource Strain: Medium Risk (11/7/2024)    Overall Financial Resource Strain (CARDIA)     Difficulty of Paying Living Expenses: Somewhat hard   Food

## 2025-02-04 ENCOUNTER — HOSPITAL ENCOUNTER (OUTPATIENT)
Facility: HOSPITAL | Age: 65
Setting detail: RECURRING SERIES
Discharge: HOME | End: 2025-02-07
Payer: OTHER GOVERNMENT

## 2025-02-04 VITALS — WEIGHT: 205.4 LBS | BODY MASS INDEX: 35.24 KG/M2

## 2025-02-04 PROCEDURE — 93798 PHYS/QHP OP CAR RHAB W/ECG: CPT

## 2025-02-04 PROCEDURE — 93797 PHYS/QHP OP CAR RHAB WO ECG: CPT

## 2025-02-04 ASSESSMENT — PATIENT HEALTH QUESTIONNAIRE - PHQ9
8. MOVING OR SPEAKING SO SLOWLY THAT OTHER PEOPLE COULD HAVE NOTICED. OR THE OPPOSITE, BEING SO FIGETY OR RESTLESS THAT YOU HAVE BEEN MOVING AROUND A LOT MORE THAN USUAL: NOT AT ALL
SUM OF ALL RESPONSES TO PHQ QUESTIONS 1-9: 9
2. FEELING DOWN, DEPRESSED OR HOPELESS: SEVERAL DAYS
6. FEELING BAD ABOUT YOURSELF - OR THAT YOU ARE A FAILURE OR HAVE LET YOURSELF OR YOUR FAMILY DOWN: SEVERAL DAYS
1. LITTLE INTEREST OR PLEASURE IN DOING THINGS: SEVERAL DAYS
5. POOR APPETITE OR OVEREATING: MORE THAN HALF THE DAYS
SUM OF ALL RESPONSES TO PHQ QUESTIONS 1-9: 9
9. THOUGHTS THAT YOU WOULD BE BETTER OFF DEAD, OR OF HURTING YOURSELF: NOT AT ALL
7. TROUBLE CONCENTRATING ON THINGS, SUCH AS READING THE NEWSPAPER OR WATCHING TELEVISION: NOT AT ALL
3. TROUBLE FALLING OR STAYING ASLEEP: MORE THAN HALF THE DAYS
4. FEELING TIRED OR HAVING LITTLE ENERGY: MORE THAN HALF THE DAYS
SUM OF ALL RESPONSES TO PHQ QUESTIONS 1-9: 9
SUM OF ALL RESPONSES TO PHQ QUESTIONS 1-9: 9
10. IF YOU CHECKED OFF ANY PROBLEMS, HOW DIFFICULT HAVE THESE PROBLEMS MADE IT FOR YOU TO DO YOUR WORK, TAKE CARE OF THINGS AT HOME, OR GET ALONG WITH OTHER PEOPLE: SOMEWHAT DIFFICULT
SUM OF ALL RESPONSES TO PHQ9 QUESTIONS 1 & 2: 2

## 2025-02-04 ASSESSMENT — EXERCISE STRESS TEST
PEAK_METS: 2.6
PEAK_BP: 163/98
PEAK_RPE: 13
PEAK_HR: 111

## 2025-02-04 NOTE — CARDIO/PULMONARY
INTAKE APPOINTMENT NOTE  2025    NAME: Giovanna Zepeda : 1960 AGE: 64 y.o.  GENDER: female    CARDIAC REHAB ADMITTING DIAGNOSIS: Acute on chronic combined systolic and diastolic congestive heart failure (HCC) [I50.43]    REFERRING PHYSICIAN: Rigoberto Tafoya MD    MEDICAL HX:  Past Medical History:   Diagnosis Date    CHF (congestive heart failure) (HCC)     Hypercholesterolemia     Hypertension 12/10/2020    Hypokalemia 12/10/2020    Menopause     Non-ischemic cardiomyopathy (HCC)     Obesity     Personal history of colonic polyps 2021       LABS:     No results found for: \"HBA1C\", \"[id number]\"  Lab Results   Component Value Date/Time    CHOL 212 2025 08:02 AM    HDL 51 2025 08:02 AM     2025 08:02 AM    LDL 86 2023 10:36 AM    VLDL 19 2025 08:02 AM    VLDL 15 2023 08:55 AM         ANTHROPOMETRICS:      Ht Readings from Last 1 Encounters:   25 1.626 m (5' 4.02\")      Wt Readings from Last 1 Encounters:   25 93.2 kg (205 lb 6.4 oz)        WAIST: 46       VISIT SUMMARY:    Giovanna Zepeda 64 y.o. presented to Cardiac Rehab for program orientation and 6 minute walk test today with a primary diagnosis of Acute on chronic combined systolic and diastolic congestive heart failure (HCC) [I50.43]. EF is 15 % Cardiac risk factors include smoking/ tobacco exposure, family history, dyslipidemia, obesity, hypertension, stress.   Patient is a recent smoker who quit on 2025. QuitNow program information (received on AVS/flyer given to patient). Smoking cessation plan includes to continue taking her Wellbutrin. Tobacco smart goal of continuing to abstain from smoking added to treatment plan.  Giovanna Zepeda is  and lives with her mother, brother, and nieces. Patient was evaluated for depression using the PHQ-9 assessment tool with a result of 9 which is considered mild. The result was discussed with patient. Results faxed to Margie

## 2025-02-05 ENCOUNTER — HOSPITAL ENCOUNTER (OUTPATIENT)
Facility: HOSPITAL | Age: 65
Setting detail: RECURRING SERIES
Discharge: HOME | End: 2025-02-08
Payer: OTHER GOVERNMENT

## 2025-02-05 VITALS — BODY MASS INDEX: 35.27 KG/M2 | WEIGHT: 205.6 LBS

## 2025-02-05 PROCEDURE — 93798 PHYS/QHP OP CAR RHAB W/ECG: CPT

## 2025-02-05 PROCEDURE — 93797 PHYS/QHP OP CAR RHAB WO ECG: CPT

## 2025-02-05 ASSESSMENT — EXERCISE STRESS TEST
PEAK_METS: 2.6
PEAK_BP: 163/102
PEAK_RPE: 13
PEAK_HR: 96

## 2025-02-06 ENCOUNTER — HOSPITAL ENCOUNTER (OUTPATIENT)
Facility: HOSPITAL | Age: 65
Setting detail: RECURRING SERIES
Discharge: HOME | End: 2025-02-09
Payer: OTHER GOVERNMENT

## 2025-02-06 VITALS — BODY MASS INDEX: 35.14 KG/M2 | WEIGHT: 204.8 LBS

## 2025-02-06 PROCEDURE — 93798 PHYS/QHP OP CAR RHAB W/ECG: CPT

## 2025-02-06 ASSESSMENT — EXERCISE STRESS TEST
PEAK_BP: 152/96
PEAK_RPE: 11
PEAK_HR: 105
PEAK_METS: 2.6

## 2025-02-10 ENCOUNTER — HOSPITAL ENCOUNTER (OUTPATIENT)
Facility: HOSPITAL | Age: 65
Setting detail: RECURRING SERIES
Discharge: HOME | End: 2025-02-13
Payer: OTHER GOVERNMENT

## 2025-02-10 VITALS — WEIGHT: 205.8 LBS | BODY MASS INDEX: 35.31 KG/M2

## 2025-02-10 PROCEDURE — 93797 PHYS/QHP OP CAR RHAB WO ECG: CPT

## 2025-02-10 PROCEDURE — 93798 PHYS/QHP OP CAR RHAB W/ECG: CPT

## 2025-02-10 ASSESSMENT — EXERCISE STRESS TEST
PEAK_HR: 102
PEAK_BP: 167/94
PEAK_METS: 2.6
PEAK_RPE: 7

## 2025-02-10 NOTE — CARDIO/PULMONARY
many healthy choices; 41-57: Some choices need improving 24-40: many choices need improving)    24 HOUR DIET RECALL  Breakfast Raisin bran with whole milk   Lunch Chicken leg with baked beans    Dinner Baked chicken with string beans    Snacks Banana, peanut butter crackers    Beverages Water, 3 pepsis daily      Environmental/Social:    Estimated Energy Needs: 1921 (using [employer] [address] with AF 1.3)    NUTRITION INTERVENTION:  Nutrition 60 minute one-on-one education & goal setting with Giovanna Zepeda    Reviewed with relevant labs compared to ideals.    Reviewed weight history and patient's verbalized weight goal as well as any real or perceived barriers to obtaining the goal. Collaborated with patient to set a specific short and long term weight goal.     Reviewed Rate Your Plate and conducted a verbal diet recall.  Assessed for environmental, financial, psychosocial, physical and comorbidities that may impact the food and eating patterns / behaviors.    Collaborated with patient to set specific nutrient goals as well as specific food / behavior changes that will help patient meet the overall goal of following a heart healthy eating pattern (using guidelines as set forth by the American Heart Association and modeled after healthful eating patterns as recognized by the USDA Dietary Guidelines such as DASH, Mediterranean or plant-based).    Briefly reviewed with Giovanna Zepeda the nutrition information in the Cardiac Rehab patient education book and encouraged Giovanna Zepeda to read thoroughly, ask questions as needed, and use for future reference for heart healthy nutrition information.    Supplemental handouts provided: cookbook.     Giovanna Zepeda is scheduled to participate in Cardiac Rehab group nutrition classes.    PATIENT GOALS:      Weight Goals:  Short Term Weight Goal:180 lbs  Long Term Weight Goal:170 lbs    Nutrition Goals:  Daily Recommendations:  Calories: 1421 /day  (for weight

## 2025-02-12 ENCOUNTER — HOSPITAL ENCOUNTER (OUTPATIENT)
Facility: HOSPITAL | Age: 65
Setting detail: RECURRING SERIES
Discharge: HOME | End: 2025-02-15
Payer: OTHER GOVERNMENT

## 2025-02-12 VITALS — BODY MASS INDEX: 35.24 KG/M2 | WEIGHT: 205.4 LBS

## 2025-02-12 PROCEDURE — 93797 PHYS/QHP OP CAR RHAB WO ECG: CPT

## 2025-02-12 PROCEDURE — 93798 PHYS/QHP OP CAR RHAB W/ECG: CPT

## 2025-02-12 ASSESSMENT — EXERCISE STRESS TEST
PEAK_BP: 144/88
PEAK_METS: 3.3
PEAK_RPE: 13
PEAK_HR: 115

## 2025-02-13 ENCOUNTER — HOSPITAL ENCOUNTER (OUTPATIENT)
Facility: HOSPITAL | Age: 65
Setting detail: RECURRING SERIES
Discharge: HOME | End: 2025-02-16
Payer: OTHER GOVERNMENT

## 2025-02-13 VITALS — WEIGHT: 205.6 LBS | BODY MASS INDEX: 35.27 KG/M2

## 2025-02-13 PROCEDURE — 93798 PHYS/QHP OP CAR RHAB W/ECG: CPT

## 2025-02-13 ASSESSMENT — EXERCISE STRESS TEST
PEAK_METS: 3.3
PEAK_HR: 111
PEAK_BP: 151/100
PEAK_RPE: 13

## 2025-02-17 ENCOUNTER — HOSPITAL ENCOUNTER (OUTPATIENT)
Facility: HOSPITAL | Age: 65
Setting detail: RECURRING SERIES
Discharge: HOME | End: 2025-02-20
Payer: OTHER GOVERNMENT

## 2025-02-17 VITALS — WEIGHT: 205.6 LBS | BODY MASS INDEX: 35.27 KG/M2

## 2025-02-17 PROCEDURE — 93798 PHYS/QHP OP CAR RHAB W/ECG: CPT

## 2025-02-17 ASSESSMENT — EXERCISE STRESS TEST
PEAK_BP: 172/110
PEAK_HR: 94
PEAK_RPE: 15
PEAK_METS: 3.6

## 2025-02-19 ENCOUNTER — HOSPITAL ENCOUNTER (OUTPATIENT)
Facility: HOSPITAL | Age: 65
Setting detail: RECURRING SERIES
End: 2025-02-19
Payer: OTHER GOVERNMENT

## 2025-02-19 ENCOUNTER — APPOINTMENT (OUTPATIENT)
Facility: HOSPITAL | Age: 65
End: 2025-02-19
Payer: OTHER GOVERNMENT

## 2025-02-20 ENCOUNTER — APPOINTMENT (OUTPATIENT)
Facility: HOSPITAL | Age: 65
End: 2025-02-20
Payer: OTHER GOVERNMENT

## 2025-02-24 ENCOUNTER — HOSPITAL ENCOUNTER (OUTPATIENT)
Facility: HOSPITAL | Age: 65
Setting detail: RECURRING SERIES
Discharge: HOME | End: 2025-02-27
Payer: OTHER GOVERNMENT

## 2025-02-24 ENCOUNTER — HOSPITAL ENCOUNTER (OUTPATIENT)
Facility: HOSPITAL | Age: 65
Discharge: HOME | End: 2025-02-27
Attending: INTERNAL MEDICINE
Payer: OTHER GOVERNMENT

## 2025-02-24 VITALS — BODY MASS INDEX: 34.93 KG/M2 | WEIGHT: 203.6 LBS

## 2025-02-24 VITALS — WEIGHT: 204 LBS | BODY MASS INDEX: 35 KG/M2

## 2025-02-24 DIAGNOSIS — I50.22 CHRONIC SYSTOLIC HEART FAILURE (HCC): ICD-10-CM

## 2025-02-24 PROCEDURE — 75561 CARDIAC MRI FOR MORPH W/DYE: CPT

## 2025-02-24 PROCEDURE — A9579 GAD-BASE MR CONTRAST NOS,1ML: HCPCS | Performed by: INTERNAL MEDICINE

## 2025-02-24 PROCEDURE — 6360000004 HC RX CONTRAST MEDICATION: Performed by: INTERNAL MEDICINE

## 2025-02-24 PROCEDURE — 93798 PHYS/QHP OP CAR RHAB W/ECG: CPT

## 2025-02-24 RX ADMIN — GADOTERIDOL 27 ML: 279.3 INJECTION, SOLUTION INTRAVENOUS at 12:12

## 2025-02-24 ASSESSMENT — EXERCISE STRESS TEST
PEAK_METS: 3.6
PEAK_BP: 144/90
PEAK_RPE: 14
PEAK_HR: 94

## 2025-02-26 ENCOUNTER — HOSPITAL ENCOUNTER (OUTPATIENT)
Facility: HOSPITAL | Age: 65
Setting detail: RECURRING SERIES
Discharge: HOME | End: 2025-03-01
Payer: OTHER GOVERNMENT

## 2025-02-26 ENCOUNTER — TELEPHONE (OUTPATIENT)
Age: 65
End: 2025-02-26

## 2025-02-26 VITALS — BODY MASS INDEX: 34.79 KG/M2 | WEIGHT: 202.8 LBS

## 2025-02-26 PROCEDURE — 93797 PHYS/QHP OP CAR RHAB WO ECG: CPT

## 2025-02-26 PROCEDURE — 93798 PHYS/QHP OP CAR RHAB W/ECG: CPT

## 2025-02-26 ASSESSMENT — EXERCISE STRESS TEST
PEAK_METS: 3.6
PEAK_BP: 138/86
PEAK_HR: 101
PEAK_RPE: 13

## 2025-02-27 ENCOUNTER — HOSPITAL ENCOUNTER (OUTPATIENT)
Facility: HOSPITAL | Age: 65
Setting detail: RECURRING SERIES
End: 2025-02-27
Payer: OTHER GOVERNMENT

## 2025-02-27 VITALS — BODY MASS INDEX: 35.03 KG/M2 | WEIGHT: 204.2 LBS

## 2025-02-27 PROCEDURE — 93798 PHYS/QHP OP CAR RHAB W/ECG: CPT

## 2025-02-27 ASSESSMENT — EXERCISE STRESS TEST
PEAK_BP: 145/91
PEAK_RPE: 13
PEAK_METS: 3.6
PEAK_HR: 106

## 2025-02-28 ENCOUNTER — TELEPHONE (OUTPATIENT)
Age: 65
End: 2025-02-28

## 2025-02-28 ENCOUNTER — OFFICE VISIT (OUTPATIENT)
Age: 65
End: 2025-02-28

## 2025-02-28 VITALS
HEART RATE: 72 BPM | SYSTOLIC BLOOD PRESSURE: 156 MMHG | OXYGEN SATURATION: 98 % | RESPIRATION RATE: 20 BRPM | WEIGHT: 204 LBS | BODY MASS INDEX: 34.83 KG/M2 | HEIGHT: 64 IN | DIASTOLIC BLOOD PRESSURE: 94 MMHG

## 2025-02-28 DIAGNOSIS — I42.8 NONISCHEMIC CARDIOMYOPATHY (HCC): Primary | ICD-10-CM

## 2025-02-28 DIAGNOSIS — E61.1 IRON DEFICIENCY: ICD-10-CM

## 2025-02-28 DIAGNOSIS — E55.9 VITAMIN D DEFICIENCY: ICD-10-CM

## 2025-02-28 DIAGNOSIS — Z79.899 ENCOUNTER FOR MONITORING DIURETIC THERAPY: ICD-10-CM

## 2025-02-28 DIAGNOSIS — F17.201 TOBACCO USE DISORDER, MILD, IN EARLY REMISSION: ICD-10-CM

## 2025-02-28 DIAGNOSIS — Z51.81 ENCOUNTER FOR MONITORING DIURETIC THERAPY: ICD-10-CM

## 2025-02-28 DIAGNOSIS — R11.2 NAUSEA AND VOMITING, UNSPECIFIED VOMITING TYPE: ICD-10-CM

## 2025-02-28 LAB
DISTANCE WALKED: NORMAL
SPO2: NORMAL

## 2025-02-28 RX ORDER — ATORVASTATIN CALCIUM 80 MG/1
80 TABLET, FILM COATED ORAL DAILY
Qty: 90 TABLET | Refills: 1 | Status: SHIPPED | OUTPATIENT
Start: 2025-02-28

## 2025-02-28 RX ORDER — SACUBITRIL AND VALSARTAN 97; 103 MG/1; MG/1
1 TABLET, FILM COATED ORAL 2 TIMES DAILY
Qty: 60 TABLET | Refills: 3 | Status: SHIPPED | OUTPATIENT
Start: 2025-02-28

## 2025-02-28 RX ORDER — ISOSORBIDE DINITRATE 10 MG/1
20 TABLET ORAL 3 TIMES DAILY
Qty: 90 TABLET | Refills: 3 | Status: SHIPPED | OUTPATIENT
Start: 2025-02-28

## 2025-02-28 RX ORDER — ESOMEPRAZOLE MAGNESIUM 40 MG/1
40 CAPSULE, DELAYED RELEASE ORAL
Qty: 90 CAPSULE | Refills: 1 | Status: SHIPPED | OUTPATIENT
Start: 2025-02-28

## 2025-02-28 RX ORDER — HYDRALAZINE HYDROCHLORIDE 25 MG/1
25 TABLET, FILM COATED ORAL 4 TIMES DAILY
Qty: 90 TABLET | Refills: 3 | Status: SHIPPED | OUTPATIENT
Start: 2025-02-28

## 2025-02-28 ASSESSMENT — ENCOUNTER SYMPTOMS
ALLERGIC/IMMUNOLOGIC NEGATIVE: 1
SHORTNESS OF BREATH: 1
ABDOMINAL DISTENTION: 0
EYES NEGATIVE: 1
NAUSEA: 1
ABDOMINAL PAIN: 0
COUGH: 1
VOMITING: 1
RHINORRHEA: 0
CHEST TIGHTNESS: 0

## 2025-02-28 ASSESSMENT — PATIENT HEALTH QUESTIONNAIRE - PHQ9
SUM OF ALL RESPONSES TO PHQ QUESTIONS 1-9: 1
SUM OF ALL RESPONSES TO PHQ QUESTIONS 1-9: 1
SUM OF ALL RESPONSES TO PHQ9 QUESTIONS 1 & 2: 1
SUM OF ALL RESPONSES TO PHQ QUESTIONS 1-9: 1
1. LITTLE INTEREST OR PLEASURE IN DOING THINGS: NOT AT ALL
2. FEELING DOWN, DEPRESSED OR HOPELESS: SEVERAL DAYS
SUM OF ALL RESPONSES TO PHQ QUESTIONS 1-9: 1

## 2025-02-28 NOTE — PROGRESS NOTES
ADVANCED HEART FAILURE CENTER  [facility] in [place], [place]  Heart Failure Outpatient Clinic Note    Patient name: Giovanna Zepeda  Patient : 1960  Patient MRN: [id number]  Date of service: 25    Primary care physician: Margie Angeles APRN - NP  Primary cardiologist:  Israel Land   Primary F cardiologist: Rigoberto Tafoya MD      CHIEF COMPLAINT:  Follow up for chronic systolic heart failure  Dyspnea on exertion  Nausea and Vomiting  Fatigue  Shortness of breath    ASSESSMENT:  Giovanna Zepeda is a 64 y.o. female with a history of  Chronic systolic heart failure/ NICM  Newly discovered dilated cardiomyopathy with EF of 15 to 20%  Morbid obesity  Chronic left bundle branch block  Hypertension well-controlled    CURRENT VISIT HPI:  Ms Zepeda presents to heart failure clinic for titration follow up.  She is having dyspnea on exertion and progressive fatigue. She uses her inhaler 2-3 x a month.   She wakes up at night sometimes shortness of  breath. She denies swelling , dizziness, and chest pain.  She has occasional palpitations with some associated blurry vision.  She is wearing her LifeVest.  She quit smoking 2 weeks ago.  She has been diligently going to cardiac rehab.  She has been having nausea and  projectile vomiting 1-2 x a week for a few weeks then stops for a week. Minimal abdominal discomfort.  She was on a PPI for many years and was taken off by her primary care physician years ago.  She has had a colonoscopy in the past with Dr. Avalos in [place]. We will have her follow up with him for the nausea and vomiting.  She has difficulty sleeping and needs to sleep on her side for comfort. She has some night sweats likely from menopause.   Patient is advised to remain off work for the time being-she is currently not working, and is retired from the [employer] system. Will conitinue to keep her out of work for now. 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

## 2025-02-28 NOTE — PATIENT INSTRUCTIONS
Medication changes:    Increase atorvastatin to 80mg at night    START hydralazine 25mg three times a day     START isosorbide 10mg three times a day     Nexium daily     Contact [facility] in 1 week (by MyChart -preferable- or telephone) with your daily weights, blood pressures and any symptoms. If you leave a voicemail, please report this information in detail so we can follow up appropriately and efficiently. Be reassured, we will call you back. Phone number is [phone] option 2    Please take this to your pharmacy to notify them of the change in medications.     Testing Ordered:      Referrals:    A referral to GI has been made they will reach out to you for scheduling.       Other Recommendations:      - Record blood pressure and heart rate daily before medication and two hours after medication  - Record weight daily upon waking/after using the bathroom.   - Keep a written records of your weights and blood pressure and bring to your next appointment.   - Call the clinic at if you have a weight gain of 3 or more pounds overnight OR 5 or more pounds in one week, new/worsened shortness of breath or swelling, or if your blood pressure begins to consistently run below 90/60 and/or you begin to experience dizziness or lightheadedness. Our office phone number [phone] option 2.  - Ensure you are drinking an adequate amount of water with a goal of 6-8 eight ounce glasses (1.5-2 liters) of fluid daily. Your urine should be clear and light yellow straw colored.       Follow up 1 month with [facility]    Our monthly Heart Failure Support Group is held on the last Wednesday of every month from 5-6pm at [facility]. If you would like to attend, please RSVP to [email or web address]    Thank you for allowing us the privilege of being a part of your healthcare team! Please do not hesitate to contact our office at [phone] option 2 with any questions or concerns.

## 2025-02-28 NOTE — TELEPHONE ENCOUNTER
Called [facility]. Left message for the nurse to call us.  Patient needs scheduled a BIV/AICD with [facility].  We are happy to refer the patient to our EP if they are unable or defer to us.

## 2025-02-28 NOTE — RESULT ENCOUNTER NOTE
Cardiac MRI unfortunately shows heart function 23% with severe LVH  No infiltrative cardiomyopathy  With left bundle branch block I would recommend patient undergo BiV AICD--please fax the results to the primary cardiologist [facility]  If they want our EP team to do the BiV AICD happy to do it  Thanks Dr. Tafoya

## 2025-03-03 ENCOUNTER — OFFICE VISIT (OUTPATIENT)
Age: 65
End: 2025-03-03
Payer: OTHER GOVERNMENT

## 2025-03-03 ENCOUNTER — HOSPITAL ENCOUNTER (OUTPATIENT)
Facility: HOSPITAL | Age: 65
Setting detail: RECURRING SERIES
Discharge: HOME | End: 2025-03-06
Payer: OTHER GOVERNMENT

## 2025-03-03 VITALS
HEART RATE: 73 BPM | RESPIRATION RATE: 18 BRPM | SYSTOLIC BLOOD PRESSURE: 145 MMHG | TEMPERATURE: 97.8 F | DIASTOLIC BLOOD PRESSURE: 91 MMHG | WEIGHT: 207.2 LBS | BODY MASS INDEX: 35.37 KG/M2 | HEIGHT: 64 IN | OXYGEN SATURATION: 97 %

## 2025-03-03 VITALS — BODY MASS INDEX: 35.23 KG/M2 | WEIGHT: 205.4 LBS

## 2025-03-03 DIAGNOSIS — R11.12 PROJECTILE VOMITING WITH NAUSEA: Primary | ICD-10-CM

## 2025-03-03 DIAGNOSIS — K57.30 DIVERTICULAR DISEASE OF COLON: ICD-10-CM

## 2025-03-03 DIAGNOSIS — Z86.0100 HISTORY OF COLON POLYPS: ICD-10-CM

## 2025-03-03 PROCEDURE — 3077F SYST BP >= 140 MM HG: CPT | Performed by: INTERNAL MEDICINE

## 2025-03-03 PROCEDURE — 3080F DIAST BP >= 90 MM HG: CPT | Performed by: INTERNAL MEDICINE

## 2025-03-03 PROCEDURE — 93798 PHYS/QHP OP CAR RHAB W/ECG: CPT

## 2025-03-03 PROCEDURE — 99214 OFFICE O/P EST MOD 30 MIN: CPT | Performed by: INTERNAL MEDICINE

## 2025-03-03 ASSESSMENT — EXERCISE STRESS TEST
PEAK_HR: 104
PEAK_BP: 153/95
PEAK_RPE: 13
PEAK_METS: 3.6

## 2025-03-04 ASSESSMENT — ENCOUNTER SYMPTOMS
ABDOMINAL PAIN: 0
BLOOD IN STOOL: 0
ABDOMINAL DISTENTION: 0
NAUSEA: 1
RESPIRATORY NEGATIVE: 1
ANAL BLEEDING: 0
CONSTIPATION: 0
VOMITING: 1
DIARRHEA: 0
RECTAL PAIN: 0
BACK PAIN: 1
ALLERGIC/IMMUNOLOGIC NEGATIVE: 1

## 2025-03-04 NOTE — PROGRESS NOTES
Chief Complaint   Patient presents with    Nausea & Vomiting     \"Have you been to the ER, urgent care clinic since your last visit?  Hospitalized since your last visit?\"    NO    “Have you seen or consulted any other health care providers outside our system since your last visit?”    NO            
to person, place, and time. Mental status is at baseline.   Psychiatric:         Mood and Affect: Mood normal.         Behavior: Behavior normal.         Thought Content: Thought content normal.         Judgment: Judgment normal.        1. Projectile vomiting with nausea  We will obtain an upper GI series with small bowel follow-through for further evaluate her estimated episodes of projectile vomiting.  Patient may need EGD depending on results of that testing.  - FL UPPER GI W BARIUM SWALLOW KUB; Future    2. Diverticular disease of colon      3. History of colon polyps

## 2025-03-05 ENCOUNTER — HOSPITAL ENCOUNTER (OUTPATIENT)
Facility: HOSPITAL | Age: 65
Setting detail: RECURRING SERIES
Discharge: HOME | End: 2025-03-08
Payer: OTHER GOVERNMENT

## 2025-03-05 VITALS — BODY MASS INDEX: 35.09 KG/M2 | WEIGHT: 204.4 LBS

## 2025-03-05 DIAGNOSIS — E61.1 IRON DEFICIENCY: ICD-10-CM

## 2025-03-05 DIAGNOSIS — I42.8 NONISCHEMIC CARDIOMYOPATHY (HCC): ICD-10-CM

## 2025-03-05 DIAGNOSIS — E55.9 VITAMIN D DEFICIENCY: ICD-10-CM

## 2025-03-05 PROCEDURE — 93797 PHYS/QHP OP CAR RHAB WO ECG: CPT

## 2025-03-05 PROCEDURE — 93798 PHYS/QHP OP CAR RHAB W/ECG: CPT

## 2025-03-05 ASSESSMENT — EXERCISE STRESS TEST
PEAK_HR: 94
PEAK_BP: 136/78
PEAK_METS: 3.6
PEAK_RPE: 13

## 2025-03-06 ENCOUNTER — HOSPITAL ENCOUNTER (OUTPATIENT)
Facility: HOSPITAL | Age: 65
Setting detail: RECURRING SERIES
Discharge: HOME | End: 2025-03-09
Payer: OTHER GOVERNMENT

## 2025-03-06 VITALS — WEIGHT: 203.6 LBS | BODY MASS INDEX: 34.95 KG/M2

## 2025-03-06 PROCEDURE — 93798 PHYS/QHP OP CAR RHAB W/ECG: CPT

## 2025-03-06 ASSESSMENT — EXERCISE STRESS TEST
PEAK_BP: 153/86
PEAK_HR: 95
PEAK_METS: 3.6
PEAK_RPE: 13

## 2025-03-07 DIAGNOSIS — Z51.81 ENCOUNTER FOR MONITORING DIURETIC THERAPY: Primary | ICD-10-CM

## 2025-03-07 DIAGNOSIS — Z79.899 ENCOUNTER FOR MONITORING DIURETIC THERAPY: Primary | ICD-10-CM

## 2025-03-07 DIAGNOSIS — I50.22 CHRONIC SYSTOLIC HEART FAILURE (HCC): ICD-10-CM

## 2025-03-07 DIAGNOSIS — E61.1 IRON DEFICIENCY: ICD-10-CM

## 2025-03-07 NOTE — PROGRESS NOTES
Lab collection error  Labs re-ordered--CBC, Ferritin, Iron Profile, CMP, NT pro-BNP, Vitamin D   Message sent to Xiomy in lab  See below:    From: Mary Ellen Bryan   Sent: 3/6/2025   8:49 AM EST   To: [facility] Client Services Staff Pool; *   Subject: Lab Specimen Problem                             Hello,     Please see information below for details regarding a problem with samples received at [facility]:     Patient Name:  Giovanna Zepeda   Patient :  1960   Test(s) Affected:  CBC, Ferritin, Iron Profile, CMP, NT pro-BNP, Vitamin D   Description of Problem:  Samples were not collected from box in timely manner     Please place a new order and Client Services will contact the patient for recollection.     If you have any questions, please call [phone] and a representative will assist you.     Thank you,     [facility] Client Services

## 2025-03-08 ASSESSMENT — SLEEP AND FATIGUE QUESTIONNAIRES
DO YOU HAVE DIFFICULTY VISITING YOUR FAMILY OR FRIENDS IN THEIR HOME BECAUSE YOU BECOME SLEEPY OR TIRED: NO
HOW LIKELY ARE YOU TO NOD OFF OR FALL ASLEEP WHILE WATCHING TV: SLIGHT CHANCE OF DOZING
HOW LIKELY ARE YOU TO NOD OFF OR FALL ASLEEP WHILE SITTING AND TALKING TO SOMEONE: WOULD NEVER DOZE
HOW LIKELY ARE YOU TO NOD OFF OR FALL ASLEEP WHILE SITTING AND READING: SLIGHT CHANCE OF DOZING
HOW LIKELY ARE YOU TO NOD OFF OR FALL ASLEEP WHILE SITTING AND TALKING TO SOMEONE: WOULD NEVER DOZE
DO YOU HAVE DIFFICULTY OPERATING A MOTOR VEHICLE FOR SHORT DISTANCES (LESS THAN 100 MILES) BECAUSE YOU BECOME SLEEPY: NO
DO YOU HAVE DIFFICULTY WATCHING A MOVIE OR VIDEO BECAUSE YOU BECOME SLEEPY OR TIRED: NO
HOW LIKELY ARE YOU TO NOD OFF OR FALL ASLEEP WHILE SITTING AND READING: SLIGHT CHANCE OF DOZING
HOW LIKELY ARE YOU TO NOD OFF OR FALL ASLEEP WHILE LYING DOWN TO REST IN THE AFTERNOON WHEN CIRCUMSTANCES PERMIT: MODERATE CHANCE OF DOZING
HOW LIKELY ARE YOU TO NOD OFF OR FALL ASLEEP IN A CAR, WHILE STOPPED FOR A FEW MINUTES IN TRAFFIC: WOULD NEVER DOZE
HOW LIKELY ARE YOU TO NOD OFF OR FALL ASLEEP WHEN YOU ARE A PASSENGER IN A CAR FOR AN HOUR WITHOUT A BREAK: WOULD NEVER DOZE
ESS TOTAL SCORE: 5
DO YOU GENERALLY HAVE DIFFICULTY REMEMBERING THINGS BECAUSE YOU ARE SLEEPY OR TIRED: NO
HOW LIKELY ARE YOU TO NOD OFF OR FALL ASLEEP WHILE SITTING QUIETLY AFTER LUNCH WITHOUT ALCOHOL: SLIGHT CHANCE OF DOZING
HOW LIKELY ARE YOU TO NOD OFF OR FALL ASLEEP WHILE LYING DOWN TO REST IN THE AFTERNOON WHEN CIRCUMSTANCES PERMIT: MODERATE CHANCE OF DOZING
HOW LIKELY ARE YOU TO NOD OFF OR FALL ASLEEP WHILE SITTING QUIETLY AFTER LUNCH WITHOUT ALCOHOL: SLIGHT CHANCE OF DOZING
HOW LIKELY ARE YOU TO NOD OFF OR FALL ASLEEP WHEN YOU ARE A PASSENGER IN A CAR FOR AN HOUR WITHOUT A BREAK: WOULD NEVER DOZE
HAS YOUR MOOD BEEN AFFECTED BECAUSE YOU ARE SLEEPY OR TIRED: NO
HAS YOUR RELATIONSHIP WITH FAMILY, FRIENDS OR WORK COLLEAGUES BEEN AFFECTED BECAUSE YOU ARE SLEEPY OR TIRED: NO
HOW LIKELY ARE YOU TO NOD OFF OR FALL ASLEEP WHILE SITTING INACTIVE IN A PUBLIC PLACE: WOULD NEVER DOZE
HOW LIKELY ARE YOU TO NOD OFF OR FALL ASLEEP WHILE SITTING INACTIVE IN A PUBLIC PLACE: WOULD NEVER DOZE
HOW LIKELY ARE YOU TO NOD OFF OR FALL ASLEEP IN A CAR, WHILE STOPPED FOR A FEW MINUTES IN TRAFFIC: WOULD NEVER DOZE
DO YOU HAVE DIFFICULTY BEING AS ACTIVE AS YOU WANT TO BE IN THE EVENING BECAUSE YOU ARE SLEEPY OR TIRED: YES, LITTLE
DO YOU HAVE DIFFICULTY OPERATING A MOTOR VEHICLE FOR LONG DISTANCES (GREATER THAN 100 MILES) BECAUSE YOU BECOME SLEEPY: YES, A LITTLE
DO YOU HAVE DIFFICULTY BEING AS ACTIVE AS YOU WANT TO BE IN THE MORNING BECAUSE YOU ARE SLEEPY OR TIRED: YES, LITTLE
HOW LIKELY ARE YOU TO NOD OFF OR FALL ASLEEP WHILE WATCHING TV: SLIGHT CHANCE OF DOZING
DO YOU HAVE DIFFICULTY CONCENTRATING ON THE THINGS YOU DO BECAUSE YOU ARE SLEEPY OR TIRED: YES, A LITTLE
FOSQ SCORE: 18

## 2025-03-10 ENCOUNTER — HOSPITAL ENCOUNTER (OUTPATIENT)
Facility: HOSPITAL | Age: 65
Setting detail: RECURRING SERIES
Discharge: HOME | End: 2025-03-13
Payer: OTHER GOVERNMENT

## 2025-03-10 VITALS — BODY MASS INDEX: 35.43 KG/M2 | WEIGHT: 206.4 LBS

## 2025-03-10 PROCEDURE — 93798 PHYS/QHP OP CAR RHAB W/ECG: CPT

## 2025-03-10 ASSESSMENT — EXERCISE STRESS TEST
PEAK_METS: 3.7
PEAK_HR: 112
PEAK_BP: 154/92
PEAK_RPE: 13

## 2025-03-10 NOTE — PROGRESS NOTES
obstructive sleep apnea.     Patient's phone number [phone] (home) [phone] (work) was reviewed and confirmed for accuracy.  She gives permission for messages regarding results and appointments to be left at that number.    Barb ANDRADE-BC, [facility]  Electronically signed. 03/11/25

## 2025-03-11 ENCOUNTER — OFFICE VISIT (OUTPATIENT)
Age: 65
End: 2025-03-11
Payer: OTHER GOVERNMENT

## 2025-03-11 VITALS — HEIGHT: 64 IN | WEIGHT: 206 LBS | BODY MASS INDEX: 35.17 KG/M2

## 2025-03-11 DIAGNOSIS — G47.33 OSA (OBSTRUCTIVE SLEEP APNEA): Primary | ICD-10-CM

## 2025-03-11 DIAGNOSIS — I10 PRIMARY HYPERTENSION: ICD-10-CM

## 2025-03-11 DIAGNOSIS — I50.9 ACUTE ON CHRONIC CONGESTIVE HEART FAILURE, UNSPECIFIED HEART FAILURE TYPE (HCC): ICD-10-CM

## 2025-03-11 LAB
ALBUMIN SERPL ELPH-MCNC: 3.2 G/DL (ref 2.9–4.4)
ALBUMIN/GLOB SERPL: 1.2 (ref 0.7–1.7)
ALPHA1 GLOB SERPL ELPH-MCNC: 0.2 G/DL (ref 0–0.4)
ALPHA2 GLOB SERPL ELPH-MCNC: 0.9 G/DL (ref 0.4–1)
B-GLOBULIN SERPL ELPH-MCNC: 1.1 G/DL (ref 0.7–1.3)
GAMMA GLOB SERPL ELPH-MCNC: 0.7 G/DL (ref 0.4–1.8)
GLOBULIN SER-MCNC: 2.9 G/DL (ref 2.2–3.9)
IGA SERPL-MCNC: 360 MG/DL (ref 87–352)
IGG SERPL-MCNC: 901 MG/DL (ref 586–1602)
IGM SERPL-MCNC: 119 MG/DL (ref 26–217)
INTERPRETATION SERPL IEP-IMP: ABNORMAL
KAPPA LC FREE SER-MCNC: 21 MG/L (ref 3.3–19.4)
KAPPA LC FREE/LAMBDA FREE SER: 1.35 (ref 0.26–1.65)
LAMBDA LC FREE SERPL-MCNC: 15.6 MG/L (ref 5.7–26.3)
M PROTEIN SERPL ELPH-MCNC: ABNORMAL G/DL
PROT SERPL-MCNC: 6.1 G/DL (ref 6–8.5)

## 2025-03-11 PROCEDURE — 99203 OFFICE O/P NEW LOW 30 MIN: CPT | Performed by: NURSE PRACTITIONER

## 2025-03-11 ASSESSMENT — SLEEP AND FATIGUE QUESTIONNAIRES
AVERAGE NUMBER OF SLEEP HOURS: 6
ARE YOU BOTHERED BY WAKING UP TOO EARLY AND NOT BEING ABLE TO GET BACK TO SLEEP: YES
SELECT ANY OF THE FOLLOWING BEHAVIORS OBSERVED WHILE PATIENT ASLEEP: LOUD SNORING;TWITCHING OF LEGS OR FEET
DO YOU HAVE PROBLEMS WITH FREQUENT AWAKENINGS AT NIGHT: YES
DO YOU TAKE NAPS: YES
DO YOU GET TOO LITTLE SLEEP AT NIGHT: YES
SELECT ANY OF THE FOLLOWING BEHAVIORS OBSERVED WHILE YOU ARE ASLEEP: TWITCHING OF LEGS OR FEET
HOW LONG DO YOU NAP: 45 MINUTES TO 1 HOUR
HOW MANY NAPS DO YOU TAKE PER WEEK: 5
SELECT ANY OF THE FOLLOWING BEHAVIORS OBSERVED WHILE YOU ARE ASLEEP: LOUD SNORING
DO YOU WORK SHIFTS: NO
NUMBER OF TIMES YOU WAKE PER NIGHT: 3
WHAT TIME DO YOU USUALLY GO TO BED: 10:00

## 2025-03-12 ENCOUNTER — HOSPITAL ENCOUNTER (OUTPATIENT)
Facility: HOSPITAL | Age: 65
Setting detail: RECURRING SERIES
Discharge: HOME | End: 2025-03-15
Payer: OTHER GOVERNMENT

## 2025-03-12 VITALS — WEIGHT: 206.4 LBS | BODY MASS INDEX: 35.43 KG/M2

## 2025-03-12 PROCEDURE — 93798 PHYS/QHP OP CAR RHAB W/ECG: CPT

## 2025-03-12 PROCEDURE — 93797 PHYS/QHP OP CAR RHAB WO ECG: CPT

## 2025-03-12 ASSESSMENT — EXERCISE STRESS TEST
PEAK_HR: 107
PEAK_METS: 3.7
PEAK_RPE: 13
PEAK_BP: 150/88

## 2025-03-13 ENCOUNTER — HOSPITAL ENCOUNTER (OUTPATIENT)
Facility: HOSPITAL | Age: 65
Setting detail: RECURRING SERIES
Discharge: HOME | End: 2025-03-16
Payer: OTHER GOVERNMENT

## 2025-03-13 VITALS — WEIGHT: 205.8 LBS | BODY MASS INDEX: 35.33 KG/M2

## 2025-03-13 PROCEDURE — 93798 PHYS/QHP OP CAR RHAB W/ECG: CPT

## 2025-03-13 ASSESSMENT — EXERCISE STRESS TEST
PEAK_RPE: 10
PEAK_HR: 107
PEAK_METS: 3.7
PEAK_BP: 160/93

## 2025-03-17 ENCOUNTER — OFFICE VISIT (OUTPATIENT)
Facility: CLINIC | Age: 65
End: 2025-03-17
Payer: OTHER GOVERNMENT

## 2025-03-17 ENCOUNTER — TELEPHONE (OUTPATIENT)
Age: 65
End: 2025-03-17

## 2025-03-17 ENCOUNTER — HOSPITAL ENCOUNTER (OUTPATIENT)
Facility: HOSPITAL | Age: 65
Setting detail: RECURRING SERIES
Discharge: HOME | End: 2025-03-20
Payer: OTHER GOVERNMENT

## 2025-03-17 VITALS
BODY MASS INDEX: 34.66 KG/M2 | HEART RATE: 75 BPM | SYSTOLIC BLOOD PRESSURE: 102 MMHG | TEMPERATURE: 97.7 F | OXYGEN SATURATION: 98 % | RESPIRATION RATE: 20 BRPM | DIASTOLIC BLOOD PRESSURE: 64 MMHG | WEIGHT: 203 LBS | HEIGHT: 64 IN

## 2025-03-17 DIAGNOSIS — R42 INTERMITTENT LIGHTHEADEDNESS: Primary | ICD-10-CM

## 2025-03-17 DIAGNOSIS — I10 PRIMARY HYPERTENSION: ICD-10-CM

## 2025-03-17 PROCEDURE — 93798 PHYS/QHP OP CAR RHAB W/ECG: CPT

## 2025-03-17 PROCEDURE — 3078F DIAST BP <80 MM HG: CPT

## 2025-03-17 PROCEDURE — 3074F SYST BP LT 130 MM HG: CPT

## 2025-03-17 PROCEDURE — 99214 OFFICE O/P EST MOD 30 MIN: CPT

## 2025-03-17 NOTE — PROGRESS NOTES
Chief Complaint   Patient presents with    Medication Reaction     Pt started having  symptoms after taking the hydralazine    Blurred Vision    Lightheadedness     Pt thinks she is having a reaction to the hydralazine symptoms did not start til she started taking it     \"Have you been to the ER, urgent care clinic since your last visit?  Hospitalized since your last visit?\"    NO    “Have you seen or consulted any other health care providers outside our system since your last visit?”    NO

## 2025-03-17 NOTE — TELEPHONE ENCOUNTER
Per Alice's staff message, scheduled pt for urgent titration visit this Wed, 3/19/25 at 1:00 with Sandra.  Pt confirmed at same time, and we discussed all appt details.

## 2025-03-17 NOTE — PROGRESS NOTES
Giovanna Zepeda is a 64 y.o. female who presents to the office today for the following:    Chief Complaint   Patient presents with    Medication Reaction     Pt started having  symptoms after taking the hydralazine    Blurred Vision    Lightheadedness       Past Medical History:   Diagnosis Date    CHF (congestive heart failure) (HCC)     Hypercholesterolemia     Hypertension 12/10/2020    Hypokalemia 12/10/2020    Menopause     Non-ischemic cardiomyopathy (HCC)     Obesity     Personal history of colonic polyps 2021        Past Surgical History:   Procedure Laterality Date    CARDIAC CATHETERIZATION      CARDIAC PROCEDURE N/A 2025    Left heart cath / coronary angiography performed by Barney Sneed MD at [facility] CARDIAC CATH LAB    COLONOSCOPY N/A 12/3/2021    COLONOSCOPY (TIVA) performed by Betty Avalos MD at [facility] ENDOSCOPY    COLONOSCOPY N/A 2024    COLONOSCOPY BIOPSY performed by Betty Avalos Jr., MD at [facility] ENDOSCOPY    HYSTERECTOMY (CERVIX STATUS UNKNOWN)      partial    INVASIVE VASCULAR N/A 2025    Ultrasound guided vascular access performed by Barney Sneed MD at [facility] CARDIAC CATH LAB    OTHER SURGICAL HISTORY  2025    NON SURICAL LIFE VIST        Family History   Problem Relation Age of Onset    Hypertension Mother     Diabetes Mother     Heart Failure Mother     Cancer Father     Cerebral Aneurysm Sister     Abdominal aortic aneurysm Sister     Cancer Brother     Accidental death Brother     Diabetes Brother     Asthma Brother     Breast Cancer Maternal Grandmother     Breast Cancer Paternal Grandmother     Diabetes Brother         Social History     Tobacco Use    Smoking status: Former     Current packs/day: 0.00     Average packs/day: 1 pack/day for 35.0 years (35.0 ttl pk-yrs)     Types: Cigarettes     Start date: 1/10/1990     Quit date: 2025     Years since quittin.1    Smokeless tobacco: Never    Tobacco comments:     Quit 9 days ago   Vaping

## 2025-03-18 LAB
ALBUMIN SERPL-MCNC: 3.8 G/DL (ref 3.9–4.9)
ALP SERPL-CCNC: 115 IU/L (ref 44–121)
ALT SERPL-CCNC: 12 IU/L (ref 0–32)
AST SERPL-CCNC: 13 IU/L (ref 0–40)
BILIRUB SERPL-MCNC: 0.2 MG/DL (ref 0–1.2)
BUN SERPL-MCNC: 12 MG/DL (ref 8–27)
BUN/CREAT SERPL: 10 (ref 12–28)
CALCIUM SERPL-MCNC: 8.9 MG/DL (ref 8.7–10.3)
CHLORIDE SERPL-SCNC: 110 MMOL/L (ref 96–106)
CO2 SERPL-SCNC: 19 MMOL/L (ref 20–29)
CREAT SERPL-MCNC: 1.18 MG/DL (ref 0.57–1)
EGFRCR SERPLBLD CKD-EPI 2021: 52 ML/MIN/1.73
GLOBULIN SER CALC-MCNC: 2.3 G/DL (ref 1.5–4.5)
GLUCOSE SERPL-MCNC: 104 MG/DL (ref 70–99)
POTASSIUM SERPL-SCNC: 4.1 MMOL/L (ref 3.5–5.2)
PROT SERPL-MCNC: 6.1 G/DL (ref 6–8.5)
SODIUM SERPL-SCNC: 142 MMOL/L (ref 134–144)

## 2025-03-19 ENCOUNTER — HOSPITAL ENCOUNTER (OUTPATIENT)
Facility: HOSPITAL | Age: 65
Setting detail: RECURRING SERIES
End: 2025-03-19
Payer: OTHER GOVERNMENT

## 2025-03-19 ENCOUNTER — OFFICE VISIT (OUTPATIENT)
Age: 65
End: 2025-03-19
Payer: OTHER GOVERNMENT

## 2025-03-19 VITALS
OXYGEN SATURATION: 94 % | DIASTOLIC BLOOD PRESSURE: 84 MMHG | SYSTOLIC BLOOD PRESSURE: 124 MMHG | HEIGHT: 64 IN | BODY MASS INDEX: 34.62 KG/M2 | RESPIRATION RATE: 20 BRPM | TEMPERATURE: 97.6 F | WEIGHT: 202.8 LBS | HEART RATE: 85 BPM

## 2025-03-19 DIAGNOSIS — I50.22 CHRONIC SYSTOLIC HEART FAILURE (HCC): Primary | ICD-10-CM

## 2025-03-19 DIAGNOSIS — R42 DIZZINESS: ICD-10-CM

## 2025-03-19 DIAGNOSIS — I44.7 LBBB (LEFT BUNDLE BRANCH BLOCK): ICD-10-CM

## 2025-03-19 PROCEDURE — 3074F SYST BP LT 130 MM HG: CPT | Performed by: NURSE PRACTITIONER

## 2025-03-19 PROCEDURE — 93000 ELECTROCARDIOGRAM COMPLETE: CPT | Performed by: NURSE PRACTITIONER

## 2025-03-19 PROCEDURE — 99214 OFFICE O/P EST MOD 30 MIN: CPT | Performed by: NURSE PRACTITIONER

## 2025-03-19 PROCEDURE — 3079F DIAST BP 80-89 MM HG: CPT | Performed by: NURSE PRACTITIONER

## 2025-03-19 ASSESSMENT — PATIENT HEALTH QUESTIONNAIRE - PHQ9
2. FEELING DOWN, DEPRESSED OR HOPELESS: NOT AT ALL
SUM OF ALL RESPONSES TO PHQ QUESTIONS 1-9: 0
1. LITTLE INTEREST OR PLEASURE IN DOING THINGS: NOT AT ALL
SUM OF ALL RESPONSES TO PHQ QUESTIONS 1-9: 0

## 2025-03-19 ASSESSMENT — ENCOUNTER SYMPTOMS
NAUSEA: 0
VOMITING: 0
COUGH: 0
ABDOMINAL DISTENTION: 0
ABDOMINAL PAIN: 0
RHINORRHEA: 0
ALLERGIC/IMMUNOLOGIC NEGATIVE: 1
SHORTNESS OF BREATH: 1
CHEST TIGHTNESS: 0

## 2025-03-19 NOTE — PROGRESS NOTES
Housing Stability: Low Risk  (1/8/2025)    Housing Stability Vital Sign     Unable to Pay for Housing in the Last Year: No     Number of Times Moved in the Last Year: 0     Homeless in the Last Year: No       LABORATORY RESULTS:  Lab Results   Component Value Date/Time     03/17/2025 12:08 PM    K 4.1 03/17/2025 12:08 PM     03/17/2025 12:08 PM    CO2 19 03/17/2025 12:08 PM    BUN 12 03/17/2025 12:08 PM    GFRAA 53 09/28/2022 06:25 PM    GLOB 2.9 02/28/2025 12:04 PM    ALT 12 03/17/2025 12:08 PM    AST 13 03/17/2025 12:08 PM       Lab Results   Component Value Date/Time    WBC 3.6 01/08/2025 08:02 AM    HGB 15.3 01/08/2025 08:02 AM    HCT 47.3 01/08/2025 08:02 AM     01/08/2025 08:02 AM    MCV 87 01/08/2025 08:02 AM       ALLERGY:  No Known Allergies    TED Pineda NP     Advanced [facility] Failure Center  [facility]  [address], [address]  Phone: [phone]  Fax: [phone]    PATIENT CARE TEAM:  Patient Care Team:  Margie Angeles APRN - NP as PCP - General  Margie Angeles APRN - NP as PCP - Empaneled Provider    On this date 3/19/25 , I have spent a total time of  35 minutes personally reviewing new vitals, test results, notes from recent visits, face to face encounter/physical exam of patient with counseling, writing orders, performing shared medical decision making, and documenting.

## 2025-03-19 NOTE — PATIENT INSTRUCTIONS
healthcare team! Please do not hesitate to contact our office at [phone] option 2 with any questions or concerns.

## 2025-03-20 ENCOUNTER — APPOINTMENT (OUTPATIENT)
Facility: HOSPITAL | Age: 65
End: 2025-03-20
Payer: OTHER GOVERNMENT

## 2025-03-24 ENCOUNTER — HOSPITAL ENCOUNTER (OUTPATIENT)
Facility: HOSPITAL | Age: 65
Setting detail: RECURRING SERIES
Discharge: HOME | End: 2025-03-27
Payer: OTHER GOVERNMENT

## 2025-03-24 VITALS — WEIGHT: 203.4 LBS | BODY MASS INDEX: 34.9 KG/M2

## 2025-03-24 PROCEDURE — 93798 PHYS/QHP OP CAR RHAB W/ECG: CPT

## 2025-03-24 ASSESSMENT — EXERCISE STRESS TEST
PEAK_RPE: 13
PEAK_HR: 130
PEAK_METS: 3.7
PEAK_BP: 130/61

## 2025-03-26 ENCOUNTER — HOSPITAL ENCOUNTER (OUTPATIENT)
Facility: HOSPITAL | Age: 65
Setting detail: RECURRING SERIES
Discharge: HOME | End: 2025-03-29
Payer: OTHER GOVERNMENT

## 2025-03-26 VITALS — BODY MASS INDEX: 34.9 KG/M2 | WEIGHT: 203.4 LBS

## 2025-03-26 PROCEDURE — 93798 PHYS/QHP OP CAR RHAB W/ECG: CPT

## 2025-03-26 PROCEDURE — 93797 PHYS/QHP OP CAR RHAB WO ECG: CPT

## 2025-03-26 ASSESSMENT — EXERCISE STRESS TEST
PEAK_RPE: 13
PEAK_BP: 150/89
PEAK_HR: 107
PEAK_METS: 3.7

## 2025-03-27 ENCOUNTER — TELEPHONE (OUTPATIENT)
Age: 65
End: 2025-03-27

## 2025-03-27 ENCOUNTER — HOSPITAL ENCOUNTER (OUTPATIENT)
Facility: HOSPITAL | Age: 65
Setting detail: RECURRING SERIES
Discharge: HOME | End: 2025-03-30
Payer: OTHER GOVERNMENT

## 2025-03-27 VITALS — BODY MASS INDEX: 35.24 KG/M2 | WEIGHT: 205.4 LBS

## 2025-03-27 PROBLEM — R73.03 PREDIABETES: Status: ACTIVE | Noted: 2025-03-27

## 2025-03-27 PROCEDURE — 93798 PHYS/QHP OP CAR RHAB W/ECG: CPT

## 2025-03-27 ASSESSMENT — EXERCISE STRESS TEST
PEAK_BP: 156/90
PEAK_HR: 117
PEAK_METS: 3.7
PEAK_RPE: 11

## 2025-03-27 NOTE — TELEPHONE ENCOUNTER
Pt is calling because she has concerns in regards to her being able to return to work. Pt states that she was told she was unable to work but since then has received a letter in the mail stating that she is able to return. Pt would like updates from her provider.

## 2025-03-27 NOTE — TELEPHONE ENCOUNTER
Returned call to patient, alerted her that [facility] had requested patient remain out of work siting letter from 2/28 with lifting restrictions. 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     Patient thinks [facility]ham may have said okay for patient to return to work. RN asked patient to reach out to HR dept and have them fax necessary paperwork to [facility] at [phone] to complete, as patient is not okay to return to work at this time.    Patient will alert [facility] once this is completed

## 2025-03-31 ENCOUNTER — HOSPITAL ENCOUNTER (OUTPATIENT)
Facility: HOSPITAL | Age: 65
Setting detail: RECURRING SERIES
Discharge: HOME | End: 2025-04-03
Payer: OTHER GOVERNMENT

## 2025-03-31 VITALS — WEIGHT: 203.2 LBS | BODY MASS INDEX: 34.86 KG/M2

## 2025-03-31 PROCEDURE — 93798 PHYS/QHP OP CAR RHAB W/ECG: CPT

## 2025-03-31 ASSESSMENT — EXERCISE STRESS TEST
PEAK_METS: 3.7
PEAK_BP: 138/80
PEAK_HR: 114
PEAK_RPE: 12

## 2025-04-02 ENCOUNTER — HOSPITAL ENCOUNTER (OUTPATIENT)
Facility: HOSPITAL | Age: 65
Setting detail: RECURRING SERIES
Discharge: HOME | End: 2025-04-05
Payer: OTHER GOVERNMENT

## 2025-04-02 VITALS — BODY MASS INDEX: 34.83 KG/M2 | WEIGHT: 203 LBS

## 2025-04-02 PROCEDURE — 93797 PHYS/QHP OP CAR RHAB WO ECG: CPT

## 2025-04-02 PROCEDURE — 93798 PHYS/QHP OP CAR RHAB W/ECG: CPT

## 2025-04-02 ASSESSMENT — EXERCISE STRESS TEST
PEAK_METS: 3.7
PEAK_HR: 104
PEAK_BP: 126/81
PEAK_RPE: 11

## 2025-04-03 ENCOUNTER — HOSPITAL ENCOUNTER (OUTPATIENT)
Facility: HOSPITAL | Age: 65
Setting detail: RECURRING SERIES
Discharge: HOME | End: 2025-04-06
Payer: OTHER GOVERNMENT

## 2025-04-03 VITALS — WEIGHT: 203.1 LBS | BODY MASS INDEX: 34.84 KG/M2

## 2025-04-03 PROCEDURE — 93798 PHYS/QHP OP CAR RHAB W/ECG: CPT

## 2025-04-03 ASSESSMENT — EXERCISE STRESS TEST
PEAK_HR: 96
PEAK_METS: 3.7
PEAK_BP: 139/75
PEAK_RPE: 13

## 2025-04-04 ENCOUNTER — NURSE ONLY (OUTPATIENT)
Age: 65
End: 2025-04-04

## 2025-04-04 DIAGNOSIS — I50.22 CHRONIC SYSTOLIC HEART FAILURE (HCC): Primary | ICD-10-CM

## 2025-04-04 NOTE — PROGRESS NOTES
Patient had ectopy with ST depression 3/31 at cardiac rehabilitation, sent to Dr. Tafoya. Patient has lifevest and follow up with EP. Normal [facility] 1/16. No documentation included if patient was symptomatic or not.  Nursing has reached out to patent on 4/1. It is listed as \"read\" but no response from patient.

## 2025-04-07 ENCOUNTER — HOSPITAL ENCOUNTER (OUTPATIENT)
Facility: HOSPITAL | Age: 65
Setting detail: RECURRING SERIES
Discharge: HOME | End: 2025-04-10
Payer: OTHER GOVERNMENT

## 2025-04-07 VITALS — WEIGHT: 201.2 LBS | BODY MASS INDEX: 34.52 KG/M2

## 2025-04-07 PROCEDURE — 93798 PHYS/QHP OP CAR RHAB W/ECG: CPT

## 2025-04-07 ASSESSMENT — EXERCISE STRESS TEST
PEAK_METS: 3.8
PEAK_HR: 102
PEAK_RPE: 11
PEAK_BP: 145/85

## 2025-04-08 ENCOUNTER — TELEPHONE (OUTPATIENT)
Age: 65
End: 2025-04-08

## 2025-04-08 NOTE — TELEPHONE ENCOUNTER
Telephone Call RE:  Appointment reminder     Outcome:     [x] Patient confirmed appointment   [] Patient rescheduled appointment for    [] Unable to reach  [] Left message              [] Other:       Pt's niece confirmed appt and will be bringing pt.  Covered all appt details.

## 2025-04-09 ENCOUNTER — HOSPITAL ENCOUNTER (OUTPATIENT)
Facility: HOSPITAL | Age: 65
Setting detail: RECURRING SERIES
Discharge: HOME | End: 2025-04-12
Payer: OTHER GOVERNMENT

## 2025-04-09 VITALS — WEIGHT: 202.2 LBS | BODY MASS INDEX: 34.69 KG/M2

## 2025-04-09 PROCEDURE — 93798 PHYS/QHP OP CAR RHAB W/ECG: CPT

## 2025-04-09 PROCEDURE — 93797 PHYS/QHP OP CAR RHAB WO ECG: CPT

## 2025-04-09 ASSESSMENT — ENCOUNTER SYMPTOMS
ALLERGIC/IMMUNOLOGIC NEGATIVE: 1
COUGH: 0
VOMITING: 0
RHINORRHEA: 0
SHORTNESS OF BREATH: 1
NAUSEA: 0
ABDOMINAL DISTENTION: 0
ABDOMINAL PAIN: 0
CHEST TIGHTNESS: 0

## 2025-04-09 ASSESSMENT — EXERCISE STRESS TEST
PEAK_BP: 144/82
PEAK_METS: 3.8
PEAK_RPE: 11
PEAK_HR: 101

## 2025-04-09 NOTE — PROGRESS NOTES
pulmonary disease) (HCC)    Prediabetes        LIFE GOALS:  Lifestyle goals reviewed with the patient.  Patient's personal goals include: Discussed    CARDIAC IMAGING and DIAGNOSTICS Reviewed:  01/16/25    CARDIAC PROCEDURE 01/16/2025 12:04 PM (Final)    Conclusion    Ultrasound-guided right radial artery access    Moderate sedation    Angiographically normal coronaries    Nonischemic cardiomyopathy    Low LVEDP at 2 mmHg    Recommend guideline directed therapy for nonischemic cardiomyopathy and continued diagnostic workup    Signed by: Barney Sneed MD on 1/16/2025 12:04 PM    EKG  Normal sinus rhythm with left bundle branch block    ECHO  11/07/24    ECHO (TTE) COMPLETE (PRN CONTRAST/BUBBLE/STRAIN/3D) 11/07/2024  8:00 PM (Final)    Interpretation Summary    Left Ventricle: Severely reduced left ventricular systolic function with a visually estimated EF of 15 - 20%. EF by 2D Simpsons Biplane is 19%. Left ventricle is moderately dilated. Moderately increased wall thickness. Findings consistent with severe eccentric hypertrophy. Severe global hypokinesis present. Grade I diastolic dysfunction with normal LAP.    Mitral Valve: Mild to moderate regurgitation.    Left Atrium: Left atrium is mildly dilated.    Interatrial Septum: Agitated saline study was negative.    Image quality is good. Contrast used: Definity.    Signed by: Barney Sneed MD on 11/7/2024  8:00 PM     REVIEW OF SYSTEMS:  Review of Systems   Constitutional:  Positive for fatigue. Negative for activity change and appetite change.   HENT:  Negative for congestion and rhinorrhea.    Eyes:         Blurry vision when she has dizziness   Respiratory:  Positive for shortness of breath. Negative for cough and chest tightness.         GARDNER   Cardiovascular:  Negative for chest pain, palpitations and leg swelling.   Gastrointestinal:  Negative for abdominal distention, abdominal pain, nausea and vomiting.   Endocrine: Negative for heat intolerance.

## 2025-04-10 ENCOUNTER — HOSPITAL ENCOUNTER (OUTPATIENT)
Facility: HOSPITAL | Age: 65
Setting detail: RECURRING SERIES
Discharge: HOME | End: 2025-04-13
Payer: OTHER GOVERNMENT

## 2025-04-10 ENCOUNTER — OFFICE VISIT (OUTPATIENT)
Age: 65
End: 2025-04-10

## 2025-04-10 VITALS
HEIGHT: 64 IN | DIASTOLIC BLOOD PRESSURE: 94 MMHG | TEMPERATURE: 97.9 F | BODY MASS INDEX: 34.31 KG/M2 | OXYGEN SATURATION: 98 % | WEIGHT: 201 LBS | SYSTOLIC BLOOD PRESSURE: 140 MMHG | RESPIRATION RATE: 8 BRPM | HEART RATE: 65 BPM

## 2025-04-10 VITALS — WEIGHT: 202.3 LBS | BODY MASS INDEX: 34.71 KG/M2

## 2025-04-10 DIAGNOSIS — I50.20 SYSTOLIC HEART FAILURE, UNSPECIFIED HF CHRONICITY (HCC): ICD-10-CM

## 2025-04-10 DIAGNOSIS — Z51.81 ENCOUNTER FOR MONITORING DIURETIC THERAPY: Primary | ICD-10-CM

## 2025-04-10 DIAGNOSIS — Z79.899 ENCOUNTER FOR MONITORING DIURETIC THERAPY: Primary | ICD-10-CM

## 2025-04-10 DIAGNOSIS — R42 DIZZINESS: Primary | ICD-10-CM

## 2025-04-10 DIAGNOSIS — R42 DIZZINESS: ICD-10-CM

## 2025-04-10 LAB
ANION GAP SERPL CALC-SCNC: 4 MMOL/L (ref 2–12)
BUN SERPL-MCNC: 13 MG/DL (ref 6–20)
BUN/CREAT SERPL: 11 (ref 12–20)
CALCIUM SERPL-MCNC: 9.5 MG/DL (ref 8.5–10.1)
CHLORIDE SERPL-SCNC: 109 MMOL/L (ref 97–108)
CO2 SERPL-SCNC: 26 MMOL/L (ref 21–32)
CREAT SERPL-MCNC: 1.18 MG/DL (ref 0.55–1.02)
GLUCOSE SERPL-MCNC: 110 MG/DL (ref 65–100)
MAGNESIUM SERPL-MCNC: 2 MG/DL (ref 1.6–2.4)
NT PRO BNP: 691 PG/ML
POTASSIUM SERPL-SCNC: 4 MMOL/L (ref 3.5–5.1)
SODIUM SERPL-SCNC: 139 MMOL/L (ref 136–145)

## 2025-04-10 PROCEDURE — 93798 PHYS/QHP OP CAR RHAB W/ECG: CPT

## 2025-04-10 RX ORDER — CARVEDILOL 3.12 MG/1
6.25 TABLET ORAL 2 TIMES DAILY
Qty: 120 TABLET | Refills: 0
Start: 2025-04-10 | End: 2025-04-10

## 2025-04-10 RX ORDER — CARVEDILOL 3.12 MG/1
6.25 TABLET ORAL 2 TIMES DAILY
Qty: 120 TABLET | Refills: 0
Start: 2025-04-10

## 2025-04-10 RX ORDER — HYDRALAZINE HYDROCHLORIDE 25 MG/1
12.5 TABLET, FILM COATED ORAL EVERY 6 HOURS PRN
Qty: 90 TABLET | Refills: 0 | Status: SHIPPED | OUTPATIENT
Start: 2025-04-10

## 2025-04-10 ASSESSMENT — EXERCISE STRESS TEST
PEAK_METS: 3.8
PEAK_BP: 156/93
PEAK_RPE: 11
PEAK_HR: 102

## 2025-04-10 ASSESSMENT — PATIENT HEALTH QUESTIONNAIRE - PHQ9
SUM OF ALL RESPONSES TO PHQ QUESTIONS 1-9: 0
1. LITTLE INTEREST OR PLEASURE IN DOING THINGS: NOT AT ALL
SUM OF ALL RESPONSES TO PHQ QUESTIONS 1-9: 0
2. FEELING DOWN, DEPRESSED OR HOPELESS: NOT AT ALL
SUM OF ALL RESPONSES TO PHQ QUESTIONS 1-9: 0
SUM OF ALL RESPONSES TO PHQ QUESTIONS 1-9: 0

## 2025-04-10 NOTE — PATIENT INSTRUCTIONS
Medication changes:    Take hydralazine 12.5mg every 6 hours as needed for SBP >160    Testing Ordered:    Labs today in clinic    Please call care coordination to schedule CPET (cardio pulmonary stress test) and carotid doppler at [phone]    Other Recommendations:      - Record blood pressure and heart rate daily before medication and two hours after medication  - Record weight daily upon waking/after using the bathroom.   - Keep a written records of your weights and blood pressure and bring to your next appointment.   - Call the clinic at if you have a weight gain of 3 or more pounds overnight OR 5 or more pounds in one week, new/worsened shortness of breath or swelling, or if your blood pressure begins to consistently run below 90/60 and/or you begin to experience dizziness or lightheadedness. Our office phone number [phone] option 2.  - Ensure you are drinking an adequate amount of water with a goal of 6-8 eight ounce glasses (1.5-2 liters) of fluid daily. Your urine should be clear and light yellow straw colored.       Follow up 1 month med titration with [facility]    Our monthly Heart Failure Support Group is held on the last Wednesday of every month from 5-6pm at [facility]. If you would like to attend, please RSVP to [email or web address]    Thank you for allowing us the privilege of being a part of your healthcare team! Please do not hesitate to contact our office at [phone] option 2 with any questions or concerns.

## 2025-04-14 ENCOUNTER — RESULTS FOLLOW-UP (OUTPATIENT)
Age: 65
End: 2025-04-14

## 2025-04-14 ENCOUNTER — HOSPITAL ENCOUNTER (OUTPATIENT)
Facility: HOSPITAL | Age: 65
Setting detail: RECURRING SERIES
Discharge: HOME | End: 2025-04-17
Payer: OTHER GOVERNMENT

## 2025-04-14 VITALS — WEIGHT: 201.4 LBS | BODY MASS INDEX: 34.55 KG/M2

## 2025-04-14 PROCEDURE — 93798 PHYS/QHP OP CAR RHAB W/ECG: CPT

## 2025-04-14 ASSESSMENT — PATIENT HEALTH QUESTIONNAIRE - PHQ9
6. FEELING BAD ABOUT YOURSELF - OR THAT YOU ARE A FAILURE OR HAVE LET YOURSELF OR YOUR FAMILY DOWN: SEVERAL DAYS
2. FEELING DOWN, DEPRESSED OR HOPELESS: SEVERAL DAYS
SUM OF ALL RESPONSES TO PHQ QUESTIONS 1-9: 6
5. POOR APPETITE OR OVEREATING: SEVERAL DAYS
1. LITTLE INTEREST OR PLEASURE IN DOING THINGS: NOT AT ALL
4. FEELING TIRED OR HAVING LITTLE ENERGY: SEVERAL DAYS
8. MOVING OR SPEAKING SO SLOWLY THAT OTHER PEOPLE COULD HAVE NOTICED. OR THE OPPOSITE, BEING SO FIGETY OR RESTLESS THAT YOU HAVE BEEN MOVING AROUND A LOT MORE THAN USUAL: NOT AT ALL
3. TROUBLE FALLING OR STAYING ASLEEP: SEVERAL DAYS
SUM OF ALL RESPONSES TO PHQ QUESTIONS 1-9: 6
SUM OF ALL RESPONSES TO PHQ QUESTIONS 1-9: 6
9. THOUGHTS THAT YOU WOULD BE BETTER OFF DEAD, OR OF HURTING YOURSELF: NOT AT ALL
10. IF YOU CHECKED OFF ANY PROBLEMS, HOW DIFFICULT HAVE THESE PROBLEMS MADE IT FOR YOU TO DO YOUR WORK, TAKE CARE OF THINGS AT HOME, OR GET ALONG WITH OTHER PEOPLE: EXTREMELY DIFFICULT
7. TROUBLE CONCENTRATING ON THINGS, SUCH AS READING THE NEWSPAPER OR WATCHING TELEVISION: SEVERAL DAYS
SUM OF ALL RESPONSES TO PHQ QUESTIONS 1-9: 6

## 2025-04-14 ASSESSMENT — EXERCISE STRESS TEST
PEAK_BP: 149/82
PEAK_HR: 102
PEAK_RPE: 11
PEAK_METS: 3.8

## 2025-04-14 NOTE — CARDIO/PULMONARY
DISCHARGE SUMMARY NOTE  2025      NAME: Giovanna Zepeda : 1960 AGE: 64 y.o.  GENDER: female    CARDIAC REHAB ADMITTING DIAGNOSIS: Acute on chronic combined systolic and diastolic congestive heart failure (HCC) [I50.43]    REFERRING PHYSICIAN: Rigoberto Tafoya MD    MEDICAL HX:  Past Medical History:   Diagnosis Date    CHF (congestive heart failure) (HCC)     Hypercholesterolemia     Hypertension 12/10/2020    Hypokalemia 12/10/2020    Menopause     Non-ischemic cardiomyopathy (HCC)     Obesity     Personal history of colonic polyps 2021       LABS:     No results found for: \"HBA1C\", \"IHK3RMOH\"  Lab Results   Component Value Date/Time    CHOL 212 2025 08:02 AM    HDL 51 2025 08:02 AM     2025 08:02 AM    LDL 86 2023 10:36 AM    VLDL 19 2025 08:02 AM    VLDL 15 2023 08:55 AM         ANTHROPOMETRICS:      Ht Readings from Last 1 Encounters:   04/10/25 1.626 m (5' 4.02\")      Wt Readings from Last 1 Encounters:   25 91.4 kg (201 lb 6.4 oz)        WAIST: 45 (lost 1\" since intake)         PROGRAM SUMMARY:    Giovanna Zepeda completed 36/36 sessions in the Cardiac Rehab program. She will continue exercising 3x week by walking at home and utilizing the stair stepper and she will focus on diet and nutrition at home. She attended 10 classes (Program Orientation, Risk Factors, Jeopardy, Emotions, Veg 101, Exercise, Nutrients to Know, Medications, and Mind/Body Connection as well as a one on one appointment with the staff dietician. and is aware of her cardiac risk factors and cardiac medications. Weight loss was 4lbs. MET level increase from 2.6 to 3.8. 6MWT distance increased from 580.8 ft to 1003.2 ft for a 422.4 ft increase. Her Rate Your Plate score improve from 49 to 54 and her PHQ-9 improved from 9 to 6.      Questions addressed. Discharge plans discussed. Giovanna Zepeda verbalized understanding.      Latha Blackburn RN

## 2025-04-16 ENCOUNTER — APPOINTMENT (OUTPATIENT)
Facility: HOSPITAL | Age: 65
End: 2025-04-16
Payer: OTHER GOVERNMENT

## 2025-04-17 ENCOUNTER — APPOINTMENT (OUTPATIENT)
Facility: HOSPITAL | Age: 65
End: 2025-04-17
Payer: OTHER GOVERNMENT

## 2025-04-18 ENCOUNTER — HOSPITAL ENCOUNTER (OUTPATIENT)
Facility: HOSPITAL | Age: 65
Setting detail: SPECIMEN
Discharge: HOME | End: 2025-04-21
Payer: OTHER GOVERNMENT

## 2025-04-18 ENCOUNTER — HOSPITAL ENCOUNTER (OUTPATIENT)
Facility: HOSPITAL | Age: 65
Discharge: HOME | End: 2025-04-21
Payer: OTHER GOVERNMENT

## 2025-04-18 DIAGNOSIS — I50.9 CONGESTIVE HEART FAILURE, UNSPECIFIED HF CHRONICITY, UNSPECIFIED HEART FAILURE TYPE (HCC): ICD-10-CM

## 2025-04-18 LAB
ALBUMIN SERPL-MCNC: 3.3 G/DL (ref 3.5–5)
ALBUMIN/GLOB SERPL: 0.8 (ref 1.1–2.2)
ALP SERPL-CCNC: 132 U/L (ref 45–117)
ALT SERPL-CCNC: 16 U/L (ref 12–78)
ANION GAP SERPL CALC-SCNC: 7 MMOL/L (ref 2–12)
APTT PPP: 30 SEC (ref 21.2–34.1)
AST SERPL W P-5'-P-CCNC: 13 U/L (ref 15–37)
BILIRUB SERPL-MCNC: 0.5 MG/DL (ref 0.2–1)
BUN SERPL-MCNC: 12 MG/DL (ref 6–20)
BUN/CREAT SERPL: 10 (ref 12–20)
CA-I BLD-MCNC: 9 MG/DL (ref 8.5–10.1)
CHLORIDE SERPL-SCNC: 103 MMOL/L (ref 97–108)
CO2 SERPL-SCNC: 26 MMOL/L (ref 21–32)
CREAT SERPL-MCNC: 1.25 MG/DL (ref 0.55–1.02)
EKG ATRIAL RATE: 60 BPM
EKG DIAGNOSIS: NORMAL
EKG P AXIS: 49 DEGREES
EKG P-R INTERVAL: 206 MS
EKG Q-T INTERVAL: 504 MS
EKG QRS DURATION: 173 MS
EKG QTC CALCULATION (BAZETT): 504 MS
EKG R AXIS: -1 DEGREES
EKG T AXIS: 255 DEGREES
EKG VENTRICULAR RATE: 60 BPM
ERYTHROCYTE [DISTWIDTH] IN BLOOD BY AUTOMATED COUNT: 15 % (ref 11.5–14.5)
GLOBULIN SER CALC-MCNC: 4 G/DL (ref 2–4)
GLUCOSE SERPL-MCNC: 103 MG/DL (ref 65–100)
HCT VFR BLD AUTO: 44.9 % (ref 35–47)
HGB BLD-MCNC: 14.3 G/DL (ref 11.5–16)
INR PPP: 1 (ref 0.9–1.1)
MCH RBC QN AUTO: 26.9 PG (ref 26–34)
MCHC RBC AUTO-ENTMCNC: 31.8 G/DL (ref 30–36.5)
MCV RBC AUTO: 84.4 FL (ref 80–99)
NRBC # BLD: 0 K/UL (ref 0–0.01)
NRBC BLD-RTO: 0 PER 100 WBC
PLATELET # BLD AUTO: 226 K/UL (ref 150–400)
PMV BLD AUTO: 9.7 FL (ref 8.9–12.9)
POTASSIUM SERPL-SCNC: 3.5 MMOL/L (ref 3.5–5.1)
PROT SERPL-MCNC: 7.3 G/DL (ref 6.4–8.2)
PROTHROMBIN TIME: 13.8 SEC (ref 11.9–14.6)
RBC # BLD AUTO: 5.32 M/UL (ref 3.8–5.2)
SODIUM SERPL-SCNC: 136 MMOL/L (ref 136–145)
THERAPEUTIC RANGE: NORMAL SEC (ref 82–109)
WBC # BLD AUTO: 3.7 K/UL (ref 3.6–11)

## 2025-04-18 PROCEDURE — 85027 COMPLETE CBC AUTOMATED: CPT

## 2025-04-18 PROCEDURE — 93005 ELECTROCARDIOGRAM TRACING: CPT

## 2025-04-18 PROCEDURE — 80053 COMPREHEN METABOLIC PANEL: CPT

## 2025-04-18 PROCEDURE — 85610 PROTHROMBIN TIME: CPT

## 2025-04-18 PROCEDURE — 85730 THROMBOPLASTIN TIME PARTIAL: CPT

## 2025-04-24 ENCOUNTER — HOSPITAL ENCOUNTER (OUTPATIENT)
Facility: HOSPITAL | Age: 65
Discharge: HOME | End: 2025-04-24
Attending: STUDENT IN AN ORGANIZED HEALTH CARE EDUCATION/TRAINING PROGRAM | Admitting: STUDENT IN AN ORGANIZED HEALTH CARE EDUCATION/TRAINING PROGRAM
Payer: OTHER GOVERNMENT

## 2025-04-24 VITALS
HEART RATE: 64 BPM | RESPIRATION RATE: 20 BRPM | DIASTOLIC BLOOD PRESSURE: 96 MMHG | WEIGHT: 200 LBS | TEMPERATURE: 98.1 F | OXYGEN SATURATION: 97 % | HEIGHT: 64 IN | SYSTOLIC BLOOD PRESSURE: 160 MMHG | BODY MASS INDEX: 34.15 KG/M2

## 2025-04-24 DIAGNOSIS — I50.9 HEART FAILURE (HCC): ICD-10-CM

## 2025-04-24 DIAGNOSIS — I50.22 CHRONIC SYSTOLIC HEART FAILURE (HCC): Primary | ICD-10-CM

## 2025-04-24 PROBLEM — I50.21 CHF (CONGESTIVE HEART FAILURE), NYHA CLASS I, ACUTE, SYSTOLIC (HCC): Status: ACTIVE | Noted: 2025-04-24

## 2025-04-24 LAB
ALBUMIN SERPL-MCNC: 3.4 G/DL (ref 3.5–5)
ALBUMIN/GLOB SERPL: 0.9 (ref 1.1–2.2)
ALP SERPL-CCNC: 142 U/L (ref 45–117)
ALT SERPL-CCNC: 16 U/L (ref 12–78)
ANION GAP SERPL CALC-SCNC: 6 MMOL/L (ref 2–12)
APTT PPP: 29.1 SEC (ref 21.2–34.1)
AST SERPL W P-5'-P-CCNC: 15 U/L (ref 15–37)
BILIRUB SERPL-MCNC: 0.4 MG/DL (ref 0.2–1)
BUN SERPL-MCNC: 12 MG/DL (ref 6–20)
BUN/CREAT SERPL: 12 (ref 12–20)
CA-I BLD-MCNC: 9.7 MG/DL (ref 8.5–10.1)
CHLORIDE SERPL-SCNC: 113 MMOL/L (ref 97–108)
CO2 SERPL-SCNC: 24 MMOL/L (ref 21–32)
CREAT SERPL-MCNC: 1.02 MG/DL (ref 0.55–1.02)
ECHO BSA: 2.02 M2
EKG ATRIAL RATE: 59 BPM
EKG DIAGNOSIS: NORMAL
EKG P AXIS: 28 DEGREES
EKG P-R INTERVAL: 198 MS
EKG Q-T INTERVAL: 494 MS
EKG QRS DURATION: 172 MS
EKG QTC CALCULATION (BAZETT): 489 MS
EKG R AXIS: -40 DEGREES
EKG T AXIS: 138 DEGREES
EKG VENTRICULAR RATE: 59 BPM
ERYTHROCYTE [DISTWIDTH] IN BLOOD BY AUTOMATED COUNT: 14.9 % (ref 11.5–14.5)
GLOBULIN SER CALC-MCNC: 3.6 G/DL (ref 2–4)
GLUCOSE SERPL-MCNC: 95 MG/DL (ref 65–100)
HCT VFR BLD AUTO: 42.3 % (ref 35–47)
HGB BLD-MCNC: 14.3 G/DL (ref 11.5–16)
INR PPP: 1.1 (ref 0.9–1.1)
MCH RBC QN AUTO: 27.7 PG (ref 26–34)
MCHC RBC AUTO-ENTMCNC: 33.8 G/DL (ref 30–36.5)
MCV RBC AUTO: 81.8 FL (ref 80–99)
NRBC # BLD: 0 K/UL (ref 0–0.01)
NRBC BLD-RTO: 0 PER 100 WBC
PLATELET # BLD AUTO: 220 K/UL (ref 150–400)
PMV BLD AUTO: 10.4 FL (ref 8.9–12.9)
POTASSIUM SERPL-SCNC: 3.7 MMOL/L (ref 3.5–5.1)
PROT SERPL-MCNC: 7 G/DL (ref 6.4–8.2)
PROTHROMBIN TIME: 14.4 SEC (ref 11.9–14.6)
RBC # BLD AUTO: 5.17 M/UL (ref 3.8–5.2)
SODIUM SERPL-SCNC: 143 MMOL/L (ref 136–145)
THERAPEUTIC RANGE: NORMAL SEC (ref 82–109)
WBC # BLD AUTO: 4.3 K/UL (ref 3.6–11)

## 2025-04-24 PROCEDURE — 2709999900 HC NON-CHARGEABLE SUPPLY: Performed by: STUDENT IN AN ORGANIZED HEALTH CARE EDUCATION/TRAINING PROGRAM

## 2025-04-24 PROCEDURE — 85730 THROMBOPLASTIN TIME PARTIAL: CPT

## 2025-04-24 PROCEDURE — 36415 COLL VENOUS BLD VENIPUNCTURE: CPT

## 2025-04-24 PROCEDURE — 85027 COMPLETE CBC AUTOMATED: CPT

## 2025-04-24 PROCEDURE — 7100000000 HC PACU RECOVERY - FIRST 15 MIN: Performed by: STUDENT IN AN ORGANIZED HEALTH CARE EDUCATION/TRAINING PROGRAM

## 2025-04-24 PROCEDURE — 80053 COMPREHEN METABOLIC PANEL: CPT

## 2025-04-24 PROCEDURE — C1894 INTRO/SHEATH, NON-LASER: HCPCS | Performed by: STUDENT IN AN ORGANIZED HEALTH CARE EDUCATION/TRAINING PROGRAM

## 2025-04-24 PROCEDURE — 6360000002 HC RX W HCPCS: Performed by: STUDENT IN AN ORGANIZED HEALTH CARE EDUCATION/TRAINING PROGRAM

## 2025-04-24 PROCEDURE — 76937 US GUIDE VASCULAR ACCESS: CPT | Performed by: STUDENT IN AN ORGANIZED HEALTH CARE EDUCATION/TRAINING PROGRAM

## 2025-04-24 PROCEDURE — 93451 RIGHT HEART CATH: CPT | Performed by: STUDENT IN AN ORGANIZED HEALTH CARE EDUCATION/TRAINING PROGRAM

## 2025-04-24 PROCEDURE — 85610 PROTHROMBIN TIME: CPT

## 2025-04-24 PROCEDURE — C1725 CATH, TRANSLUMIN NON-LASER: HCPCS | Performed by: STUDENT IN AN ORGANIZED HEALTH CARE EDUCATION/TRAINING PROGRAM

## 2025-04-24 PROCEDURE — 7100000011 HC PHASE II RECOVERY - ADDTL 15 MIN: Performed by: STUDENT IN AN ORGANIZED HEALTH CARE EDUCATION/TRAINING PROGRAM

## 2025-04-24 PROCEDURE — 7100000010 HC PHASE II RECOVERY - FIRST 15 MIN: Performed by: STUDENT IN AN ORGANIZED HEALTH CARE EDUCATION/TRAINING PROGRAM

## 2025-04-24 PROCEDURE — 93005 ELECTROCARDIOGRAM TRACING: CPT | Performed by: STUDENT IN AN ORGANIZED HEALTH CARE EDUCATION/TRAINING PROGRAM

## 2025-04-24 PROCEDURE — 7100000001 HC PACU RECOVERY - ADDTL 15 MIN: Performed by: STUDENT IN AN ORGANIZED HEALTH CARE EDUCATION/TRAINING PROGRAM

## 2025-04-24 ASSESSMENT — PAIN - FUNCTIONAL ASSESSMENT
PAIN_FUNCTIONAL_ASSESSMENT: 0-10
PAIN_FUNCTIONAL_ASSESSMENT: NONE - DENIES PAIN
PAIN_FUNCTIONAL_ASSESSMENT: 0-10

## 2025-04-24 NOTE — PROGRESS NOTES
Patient transferred to [facility] via stretcher in stable condition.  Bedside report given, SBAR reviewed, sites viewed. Patients  and belongings at the bedside.

## 2025-04-24 NOTE — PROGRESS NOTES
DRESSING RIGHT NECK  DRY AND INTACT , IV DC'D FROM BILATERAL AC , SITES INTACT NO SWELLING NOTED , DISCHARGE INSTRUCTIONS GIVEN TO PT AND PT'S  KEY , BOTH VERBALIZED UNDERSTANDING , NO DISTRESS NOTED

## 2025-04-24 NOTE — DISCHARGE INSTRUCTIONS
You have been given a copy of the American Heart Association's Heart Failure Educational Booklet.  Please read over this booklet and take it with you to your next physician appointment with any questions you may have.     For additional resources and to access the American Heart Association's Interactive Workbook \"Healthier Living with Heart Failure - Managing Symptoms and Reducing Risk,\" please scan the QR code below.               Download the Heart Failure Leola Jonelle: Search in your Google Play Store (Android) or [employer] Jonelle Store (Shopatron): Search for- HF Leola Jonelle.    https://www.heart.org/en/health-topics/heart-failure/heart-failure-tools-resources/pa-qbfoxm-clz    HF Leola is a brand-new phone jonelel that helps you track daily symptoms, vitals, mood, energy level and more. You can even add your heart failure care team members to view your data and monitor your condition at home.    HF Leola Lets You:  Track symptoms, medications and more  Share health information with your health care team  Connect with others living with heart failure            Coronary Angiogram: What to Expect at Home  Your Recovery  A coronary angiogram is a test to examine the large blood vessels of your heart (coronary arteries).  The doctor inserted a thin, flexible tube (catheter into a blood vessel in your groin or wrist.  Your groin or wrist may have a bruise and feel sore for a few days after the procedure.  You can do light activities around the house.  But do not do anything strenuous until your doctor says it is ok.  This may be for several days.  This care sheet gives you a general idea about how long it will take for you to recover.  But each person recovers at a different pace.  Follow the steps below to feel better as quickly as possible.    How can you care for yourself at home?    Activity  If the doctor gave you a sedative:  For 24 hours, don't do anything that requires attention to detail, such as going to work, making  site.  Pus draining from the catheter site.  A fever.  Your leg or hand is painful, looks blue, or feels cold, numb, or tingly.     Watch closely for changes in your health and be sure to contact your doctor if you have any problems.

## 2025-04-25 NOTE — PROGRESS NOTES
Post Cardiac Cath Follow Up Phone Call    Are you having pain today?    No    Have you had any bruising/bleeding/oozing/swelling at the cath site?    No    Reinforced compliance with prescribed medications, discharge instructions, and follow up appointment. Patient verbalized understanding.      Do you have any additional questions or concerns?    None at this time

## 2025-05-04 ENCOUNTER — ANESTHESIA EVENT (OUTPATIENT)
Facility: HOSPITAL | Age: 65
End: 2025-05-04
Payer: OTHER GOVERNMENT

## 2025-05-04 DIAGNOSIS — F17.200 TOBACCO DEPENDENCE: ICD-10-CM

## 2025-05-04 NOTE — ANESTHESIA PRE PROCEDURE
Department of Anesthesiology  Preprocedure Note       Name:  Giovanna Zepeda   Age:  64 y.o.  :  1960                                          MRN:  [id number]         Date:  2025      Surgeon: Surgeon(s):  Dionte Medina MD    Procedure: Procedure(s):  Insert ICD multi    Medications prior to admission:   Prior to Admission medications    Medication Sig Start Date End Date Taking? Authorizing Provider   hydrALAZINE (APRESOLINE) 25 MG tablet Take 0.5 tablets by mouth every 6 hours as needed (SBP >160) 4/10/25  Yes Alice Thomas APRN - CNP   carvedilol (COREG) 3.125 MG tablet Take 2 tablets by mouth 2 times daily 4/10/25  Yes Alice Thomas APRN - CNP   sacubitril-valsartan (ENTRESTO)  MG per tablet Take 1 tablet by mouth 2 times daily 25  Yes Alice Thomas APRN - CNP   esomeprazole (NEXIUM) 40 MG delayed release capsule Take 1 capsule by mouth every morning (before breakfast) 25  Yes Alice Thomas APRN - CNP   atorvastatin (LIPITOR) 80 MG tablet Take 1 tablet by mouth daily 25  Yes Alice Thomas APRN - CNP   spironolactone (ALDACTONE) 25 MG tablet Take 1 tablet by mouth daily 25  Yes Alice Thomas APRN - CNP   dapagliflozin (FARXIGA) 10 MG tablet Take 1 tablet by mouth every morning 25  Yes Alice Thomas APRN - CNP   potassium chloride (KLOR-CON M) 10 MEQ extended release tablet Take 1 tablet by mouth daily 25  Yes Alice Thomas APRN - CNP   buPROPion (WELLBUTRIN SR) 150 MG extended release tablet Take 1 tablet by mouth 2 times daily 25  Yes Margie Angeles APRN - NP   aspirin 81 MG chewable tablet Take 1 tablet by mouth daily   Yes Provider, MD January   albuterol sulfate HFA (PROVENTIL HFA) 108 (90 Base) MCG/ACT inhaler Inhale 2 puffs into the lungs every 4 hours as needed for Wheezing 24  Yes Temple, Margie Vides, APRN - NP   ipratropium 0.5 mg-albuterol 2.5 mg (DUONEB) 0.5-2.5 (3) MG/3ML SOLN nebulizer

## 2025-05-05 ENCOUNTER — HOSPITAL ENCOUNTER (OUTPATIENT)
Facility: HOSPITAL | Age: 65
Setting detail: OUTPATIENT SURGERY
Discharge: HOME | End: 2025-05-05
Attending: INTERNAL MEDICINE | Admitting: INTERNAL MEDICINE
Payer: OTHER GOVERNMENT

## 2025-05-05 ENCOUNTER — APPOINTMENT (OUTPATIENT)
Facility: HOSPITAL | Age: 65
End: 2025-05-05
Attending: INTERNAL MEDICINE
Payer: OTHER GOVERNMENT

## 2025-05-05 ENCOUNTER — ANESTHESIA (OUTPATIENT)
Facility: HOSPITAL | Age: 65
End: 2025-05-05
Payer: OTHER GOVERNMENT

## 2025-05-05 VITALS
BODY MASS INDEX: 33.46 KG/M2 | SYSTOLIC BLOOD PRESSURE: 118 MMHG | WEIGHT: 196 LBS | DIASTOLIC BLOOD PRESSURE: 82 MMHG | HEART RATE: 60 BPM | OXYGEN SATURATION: 94 % | TEMPERATURE: 97.3 F | HEIGHT: 64 IN | RESPIRATION RATE: 16 BRPM

## 2025-05-05 DIAGNOSIS — I42.9 CARDIOMYOPATHY (HCC): ICD-10-CM

## 2025-05-05 LAB
ANION GAP BLD CALC-SCNC: 15
CA-I BLD-MCNC: 1.15 MMOL/L (ref 1.12–1.32)
CHLORIDE BLD-SCNC: 110 MMOL/L (ref 98–107)
CO2 BLD-SCNC: 21 MMOL/L
GLUCOSE BLD STRIP.AUTO-MCNC: 110 MG/DL (ref 65–100)
POTASSIUM BLD-SCNC: 3.8 MMOL/L (ref 3.5–5.5)
SODIUM BLD-SCNC: 145 MMOL/L (ref 136–145)

## 2025-05-05 PROCEDURE — C1894 INTRO/SHEATH, NON-LASER: HCPCS | Performed by: INTERNAL MEDICINE

## 2025-05-05 PROCEDURE — C1882 AICD, OTHER THAN SING/DUAL: HCPCS | Performed by: INTERNAL MEDICINE

## 2025-05-05 PROCEDURE — C1892 INTRO/SHEATH,FIXED,PEEL-AWAY: HCPCS | Performed by: INTERNAL MEDICINE

## 2025-05-05 PROCEDURE — 80047 BASIC METABLC PNL IONIZED CA: CPT

## 2025-05-05 PROCEDURE — 7100000001 HC PACU RECOVERY - ADDTL 15 MIN: Performed by: INTERNAL MEDICINE

## 2025-05-05 PROCEDURE — 7100000011 HC PHASE II RECOVERY - ADDTL 15 MIN: Performed by: INTERNAL MEDICINE

## 2025-05-05 PROCEDURE — 2500000003 HC RX 250 WO HCPCS: Performed by: INTERNAL MEDICINE

## 2025-05-05 PROCEDURE — 33249 INSJ/RPLCMT DEFIB W/LEAD(S): CPT | Performed by: INTERNAL MEDICINE

## 2025-05-05 PROCEDURE — C1777 LEAD, AICD, ENDO SINGLE COIL: HCPCS | Performed by: INTERNAL MEDICINE

## 2025-05-05 PROCEDURE — C1887 CATHETER, GUIDING: HCPCS | Performed by: INTERNAL MEDICINE

## 2025-05-05 PROCEDURE — 36415 COLL VENOUS BLD VENIPUNCTURE: CPT

## 2025-05-05 PROCEDURE — 2500000003 HC RX 250 WO HCPCS: Performed by: NURSE ANESTHETIST, CERTIFIED REGISTERED

## 2025-05-05 PROCEDURE — 7100000000 HC PACU RECOVERY - FIRST 15 MIN: Performed by: INTERNAL MEDICINE

## 2025-05-05 PROCEDURE — 2580000003 HC RX 258: Performed by: NURSE ANESTHETIST, CERTIFIED REGISTERED

## 2025-05-05 PROCEDURE — C1893 INTRO/SHEATH, FIXED,NON-PEEL: HCPCS | Performed by: INTERNAL MEDICINE

## 2025-05-05 PROCEDURE — 71045 X-RAY EXAM CHEST 1 VIEW: CPT

## 2025-05-05 PROCEDURE — 6360000002 HC RX W HCPCS: Performed by: NURSE ANESTHETIST, CERTIFIED REGISTERED

## 2025-05-05 PROCEDURE — 76937 US GUIDE VASCULAR ACCESS: CPT | Performed by: INTERNAL MEDICINE

## 2025-05-05 PROCEDURE — 6360000002 HC RX W HCPCS: Performed by: ANESTHESIOLOGY

## 2025-05-05 PROCEDURE — 33225 L VENTRIC PACING LEAD ADD-ON: CPT | Performed by: INTERNAL MEDICINE

## 2025-05-05 PROCEDURE — 6360000002 HC RX W HCPCS: Performed by: INTERNAL MEDICINE

## 2025-05-05 PROCEDURE — C1769 GUIDE WIRE: HCPCS | Performed by: INTERNAL MEDICINE

## 2025-05-05 PROCEDURE — 2709999900 HC NON-CHARGEABLE SUPPLY: Performed by: INTERNAL MEDICINE

## 2025-05-05 PROCEDURE — 7100000010 HC PHASE II RECOVERY - FIRST 15 MIN: Performed by: INTERNAL MEDICINE

## 2025-05-05 PROCEDURE — C1898 LEAD, PMKR, OTHER THAN TRANS: HCPCS | Performed by: INTERNAL MEDICINE

## 2025-05-05 PROCEDURE — 3700000001 HC ADD 15 MINUTES (ANESTHESIA): Performed by: INTERNAL MEDICINE

## 2025-05-05 PROCEDURE — 3700000000 HC ANESTHESIA ATTENDED CARE: Performed by: INTERNAL MEDICINE

## 2025-05-05 DEVICE — LEAD 6935M62 QUATTRO SECURE S MRI US
Type: IMPLANTABLE DEVICE | Status: FUNCTIONAL
Brand: SPRINT QUATTRO SECURE S MRI™ SURESCAN™

## 2025-05-05 DEVICE — LEAD 3830 US MKT/ 69CM MRI LBBAP
Type: IMPLANTABLE DEVICE | Status: FUNCTIONAL
Brand: SELECTSECURE™ MRI SURESCAN™

## 2025-05-05 DEVICE — CRTD DTPA2D4 COBALT XT HF MRI DF4 USA
Type: IMPLANTABLE DEVICE | Status: FUNCTIONAL
Brand: COBALT™ XT HF CRT-D MRI SURESCAN™

## 2025-05-05 DEVICE — LEAD 5076-52 MRI US RCMCRD
Type: IMPLANTABLE DEVICE | Status: FUNCTIONAL
Brand: CAPSUREFIX NOVUS MRI™ SURESCAN®

## 2025-05-05 RX ORDER — SODIUM CHLORIDE 0.9 % (FLUSH) 0.9 %
5-40 SYRINGE (ML) INJECTION PRN
Status: DISCONTINUED | OUTPATIENT
Start: 2025-05-05 | End: 2025-05-05 | Stop reason: HOSPADM

## 2025-05-05 RX ORDER — ACETAMINOPHEN 325 MG/1
650 TABLET ORAL EVERY 4 HOURS PRN
Status: DISCONTINUED | OUTPATIENT
Start: 2025-05-05 | End: 2025-05-05 | Stop reason: HOSPADM

## 2025-05-05 RX ORDER — DEXMEDETOMIDINE HYDROCHLORIDE 100 UG/ML
INJECTION, SOLUTION, CONCENTRATE INTRAVENOUS
Status: DISCONTINUED | OUTPATIENT
Start: 2025-05-05 | End: 2025-05-05 | Stop reason: SDUPTHER

## 2025-05-05 RX ORDER — LIDOCAINE 4 G/G
1 PATCH TOPICAL AS NEEDED
Status: DISCONTINUED | OUTPATIENT
Start: 2025-05-05 | End: 2025-05-05 | Stop reason: HOSPADM

## 2025-05-05 RX ORDER — SODIUM CHLORIDE 9 MG/ML
INJECTION, SOLUTION INTRAVENOUS PRN
Status: DISCONTINUED | OUTPATIENT
Start: 2025-05-05 | End: 2025-05-05 | Stop reason: HOSPADM

## 2025-05-05 RX ORDER — DIPHENHYDRAMINE HYDROCHLORIDE 50 MG/ML
12.5 INJECTION, SOLUTION INTRAMUSCULAR; INTRAVENOUS
Status: DISCONTINUED | OUTPATIENT
Start: 2025-05-05 | End: 2025-05-05 | Stop reason: HOSPADM

## 2025-05-05 RX ORDER — DEXTROSE MONOHYDRATE 100 MG/ML
INJECTION, SOLUTION INTRAVENOUS CONTINUOUS PRN
Status: DISCONTINUED | OUTPATIENT
Start: 2025-05-05 | End: 2025-05-05 | Stop reason: HOSPADM

## 2025-05-05 RX ORDER — SODIUM CHLORIDE, SODIUM LACTATE, POTASSIUM CHLORIDE, CALCIUM CHLORIDE 600; 310; 30; 20 MG/100ML; MG/100ML; MG/100ML; MG/100ML
INJECTION, SOLUTION INTRAVENOUS ONCE
Status: DISCONTINUED | OUTPATIENT
Start: 2025-05-05 | End: 2025-05-05 | Stop reason: HOSPADM

## 2025-05-05 RX ORDER — IPRATROPIUM BROMIDE AND ALBUTEROL SULFATE 2.5; .5 MG/3ML; MG/3ML
1 SOLUTION RESPIRATORY (INHALATION)
Status: DISCONTINUED | OUTPATIENT
Start: 2025-05-05 | End: 2025-05-05 | Stop reason: HOSPADM

## 2025-05-05 RX ORDER — FENTANYL CITRATE 0.05 MG/ML
50 INJECTION, SOLUTION INTRAMUSCULAR; INTRAVENOUS EVERY 5 MIN PRN
Status: DISCONTINUED | OUTPATIENT
Start: 2025-05-05 | End: 2025-05-05 | Stop reason: HOSPADM

## 2025-05-05 RX ORDER — HYDROMORPHONE HYDROCHLORIDE 1 MG/ML
0.5 INJECTION, SOLUTION INTRAMUSCULAR; INTRAVENOUS; SUBCUTANEOUS EVERY 5 MIN PRN
Status: DISCONTINUED | OUTPATIENT
Start: 2025-05-05 | End: 2025-05-05 | Stop reason: HOSPADM

## 2025-05-05 RX ORDER — NOREPINEPHRINE BITARTRATE/D5W 4MG/250ML
PLASTIC BAG, INJECTION (ML) INTRAVENOUS
Status: DISCONTINUED | OUTPATIENT
Start: 2025-05-05 | End: 2025-05-05 | Stop reason: SDUPTHER

## 2025-05-05 RX ORDER — FENTANYL CITRATE 50 UG/ML
INJECTION, SOLUTION INTRAMUSCULAR; INTRAVENOUS
Status: DISCONTINUED | OUTPATIENT
Start: 2025-05-05 | End: 2025-05-05 | Stop reason: SDUPTHER

## 2025-05-05 RX ORDER — LORAZEPAM 2 MG/ML
0.5 INJECTION INTRAMUSCULAR
Status: DISCONTINUED | OUTPATIENT
Start: 2025-05-05 | End: 2025-05-05 | Stop reason: HOSPADM

## 2025-05-05 RX ORDER — OXYCODONE HYDROCHLORIDE 5 MG/1
10 TABLET ORAL PRN
Status: DISCONTINUED | OUTPATIENT
Start: 2025-05-05 | End: 2025-05-05 | Stop reason: HOSPADM

## 2025-05-05 RX ORDER — CEPHALEXIN 500 MG/1
500 CAPSULE ORAL 4 TIMES DAILY
Qty: 20 CAPSULE | Refills: 0 | Status: SHIPPED | OUTPATIENT
Start: 2025-05-05 | End: 2025-05-10

## 2025-05-05 RX ORDER — LIDOCAINE HYDROCHLORIDE AND EPINEPHRINE 10; 10 MG/ML; UG/ML
INJECTION, SOLUTION INFILTRATION; PERINEURAL PRN
Status: DISCONTINUED | OUTPATIENT
Start: 2025-05-05 | End: 2025-05-05 | Stop reason: HOSPADM

## 2025-05-05 RX ORDER — LABETALOL HYDROCHLORIDE 5 MG/ML
10 INJECTION, SOLUTION INTRAVENOUS
Status: DISCONTINUED | OUTPATIENT
Start: 2025-05-05 | End: 2025-05-05 | Stop reason: HOSPADM

## 2025-05-05 RX ORDER — SODIUM CHLORIDE 0.9 % (FLUSH) 0.9 %
5-40 SYRINGE (ML) INJECTION EVERY 12 HOURS SCHEDULED
Status: DISCONTINUED | OUTPATIENT
Start: 2025-05-05 | End: 2025-05-05 | Stop reason: HOSPADM

## 2025-05-05 RX ORDER — GLUCAGON 1 MG/ML
1 KIT INJECTION PRN
Status: DISCONTINUED | OUTPATIENT
Start: 2025-05-05 | End: 2025-05-05 | Stop reason: HOSPADM

## 2025-05-05 RX ORDER — 0.9 % SODIUM CHLORIDE 0.9 %
INTRAVENOUS SOLUTION INTRAVENOUS
Status: DISCONTINUED | OUTPATIENT
Start: 2025-05-05 | End: 2025-05-05 | Stop reason: SDUPTHER

## 2025-05-05 RX ORDER — CEFAZOLIN SODIUM 1 G/3ML
INJECTION, POWDER, FOR SOLUTION INTRAMUSCULAR; INTRAVENOUS
Status: DISCONTINUED | OUTPATIENT
Start: 2025-05-05 | End: 2025-05-05 | Stop reason: SDUPTHER

## 2025-05-05 RX ORDER — OXYCODONE HYDROCHLORIDE 5 MG/1
5 TABLET ORAL PRN
Status: DISCONTINUED | OUTPATIENT
Start: 2025-05-05 | End: 2025-05-05 | Stop reason: HOSPADM

## 2025-05-05 RX ORDER — NALOXONE HYDROCHLORIDE 0.4 MG/ML
INJECTION, SOLUTION INTRAMUSCULAR; INTRAVENOUS; SUBCUTANEOUS PRN
Status: DISCONTINUED | OUTPATIENT
Start: 2025-05-05 | End: 2025-05-05 | Stop reason: HOSPADM

## 2025-05-05 RX ORDER — SODIUM CHLORIDE 9 MG/ML
INJECTION, SOLUTION INTRAVENOUS PRN
Status: DISCONTINUED | OUTPATIENT
Start: 2025-05-05 | End: 2025-05-05 | Stop reason: SDUPTHER

## 2025-05-05 RX ORDER — METOCLOPRAMIDE HYDROCHLORIDE 5 MG/ML
10 INJECTION INTRAMUSCULAR; INTRAVENOUS
Status: DISCONTINUED | OUTPATIENT
Start: 2025-05-05 | End: 2025-05-05 | Stop reason: HOSPADM

## 2025-05-05 RX ORDER — MEPERIDINE HYDROCHLORIDE 25 MG/ML
12.5 INJECTION INTRAMUSCULAR; INTRAVENOUS; SUBCUTANEOUS EVERY 5 MIN PRN
Status: DISCONTINUED | OUTPATIENT
Start: 2025-05-05 | End: 2025-05-05 | Stop reason: HOSPADM

## 2025-05-05 RX ORDER — BUPROPION HYDROCHLORIDE 150 MG/1
150 TABLET, EXTENDED RELEASE ORAL 2 TIMES DAILY
Qty: 180 TABLET | Refills: 1 | Status: SHIPPED | OUTPATIENT
Start: 2025-05-05

## 2025-05-05 RX ORDER — ONDANSETRON 2 MG/ML
4 INJECTION INTRAMUSCULAR; INTRAVENOUS
Status: DISCONTINUED | OUTPATIENT
Start: 2025-05-05 | End: 2025-05-05 | Stop reason: HOSPADM

## 2025-05-05 RX ORDER — GLYCOPYRROLATE 0.2 MG/ML
INJECTION INTRAMUSCULAR; INTRAVENOUS
Status: DISCONTINUED | OUTPATIENT
Start: 2025-05-05 | End: 2025-05-05 | Stop reason: SDUPTHER

## 2025-05-05 RX ORDER — HYDRALAZINE HYDROCHLORIDE 20 MG/ML
10 INJECTION INTRAMUSCULAR; INTRAVENOUS
Status: DISCONTINUED | OUTPATIENT
Start: 2025-05-05 | End: 2025-05-05 | Stop reason: HOSPADM

## 2025-05-05 RX ORDER — MIDAZOLAM HYDROCHLORIDE 5 MG/5ML
INJECTION, SOLUTION INTRAMUSCULAR; INTRAVENOUS
Status: DISCONTINUED | OUTPATIENT
Start: 2025-05-05 | End: 2025-05-05 | Stop reason: SDUPTHER

## 2025-05-05 RX ADMIN — SODIUM CHLORIDE 150 ML: 9 INJECTION, SOLUTION INTRAVENOUS at 13:03

## 2025-05-05 RX ADMIN — FENTANYL CITRATE 25 MCG: 50 INJECTION INTRAMUSCULAR; INTRAVENOUS at 10:32

## 2025-05-05 RX ADMIN — DEXMEDETOMIDINE 1 MCG/KG/HR: 100 INJECTION, SOLUTION INTRAVENOUS at 10:09

## 2025-05-05 RX ADMIN — FENTANYL CITRATE 50 MCG: 0.05 INJECTION, SOLUTION INTRAMUSCULAR; INTRAVENOUS at 13:34

## 2025-05-05 RX ADMIN — MIDAZOLAM HYDROCHLORIDE 1 MG: 1 INJECTION, SOLUTION INTRAMUSCULAR; INTRAVENOUS at 10:40

## 2025-05-05 RX ADMIN — MIDAZOLAM HYDROCHLORIDE 1 MG: 1 INJECTION, SOLUTION INTRAMUSCULAR; INTRAVENOUS at 10:31

## 2025-05-05 RX ADMIN — MIDAZOLAM HYDROCHLORIDE 1 MG: 1 INJECTION, SOLUTION INTRAMUSCULAR; INTRAVENOUS at 12:42

## 2025-05-05 RX ADMIN — FENTANYL CITRATE 50 MCG: 50 INJECTION INTRAMUSCULAR; INTRAVENOUS at 11:07

## 2025-05-05 RX ADMIN — SODIUM CHLORIDE 300 ML: 9 INJECTION, SOLUTION INTRAVENOUS at 10:48

## 2025-05-05 RX ADMIN — MIDAZOLAM HYDROCHLORIDE 2 MG: 1 INJECTION, SOLUTION INTRAMUSCULAR; INTRAVENOUS at 10:07

## 2025-05-05 RX ADMIN — Medication 4 MCG/MIN: at 10:20

## 2025-05-05 RX ADMIN — DEXMEDETOMIDINE HYDROCHLORIDE 6 MCG: 100 INJECTION, SOLUTION, CONCENTRATE INTRAVENOUS at 11:30

## 2025-05-05 RX ADMIN — CEFAZOLIN 2 G: 1 INJECTION, POWDER, FOR SOLUTION INTRAMUSCULAR; INTRAVENOUS at 10:05

## 2025-05-05 RX ADMIN — SODIUM CHLORIDE 200 ML: 9 INJECTION, SOLUTION INTRAVENOUS at 11:17

## 2025-05-05 RX ADMIN — GLYCOPYRROLATE 0.2 MG: 0.2 INJECTION INTRAMUSCULAR; INTRAVENOUS at 10:09

## 2025-05-05 RX ADMIN — FENTANYL CITRATE 25 MCG: 50 INJECTION INTRAMUSCULAR; INTRAVENOUS at 10:07

## 2025-05-05 RX ADMIN — DEXMEDETOMIDINE HYDROCHLORIDE 10 MCG: 100 INJECTION, SOLUTION, CONCENTRATE INTRAVENOUS at 10:36

## 2025-05-05 ASSESSMENT — PAIN SCALES - GENERAL
PAINLEVEL_OUTOF10: 0
PAINLEVEL_OUTOF10: 6

## 2025-05-05 ASSESSMENT — PAIN DESCRIPTION - DESCRIPTORS: DESCRIPTORS: SORE

## 2025-05-05 ASSESSMENT — PAIN DESCRIPTION - FREQUENCY: FREQUENCY: CONTINUOUS

## 2025-05-05 ASSESSMENT — PAIN DESCRIPTION - LOCATION: LOCATION: ARM

## 2025-05-05 ASSESSMENT — PAIN - FUNCTIONAL ASSESSMENT
PAIN_FUNCTIONAL_ASSESSMENT: 0-10
PAIN_FUNCTIONAL_ASSESSMENT: 0-10
PAIN_FUNCTIONAL_ASSESSMENT: ACTIVITIES ARE NOT PREVENTED

## 2025-05-05 ASSESSMENT — PAIN DESCRIPTION - ORIENTATION: ORIENTATION: RIGHT

## 2025-05-05 ASSESSMENT — PAIN DESCRIPTION - PAIN TYPE: TYPE: ACUTE PAIN

## 2025-05-05 ASSESSMENT — PAIN DESCRIPTION - ONSET: ONSET: GRADUAL

## 2025-05-05 NOTE — PROGRESS NOTES
JB Busch at bedside. Messaged Dr. Sandy earlier about patients apnea spells, low O2 sats and mouth full of old denture paste, states she is hot and can't catch her breath. JB Busch assessed patient, and talked with patient and family. O2 sats currrently 88-98% on RA depending on sleep. Still has apnea spells. Wakes up on own and takes deep breaths. Cooling measures in place. Monitor on. Call bell in reach. Family at bedside. Patient drowsy, in and out of sleep. Drsg to left upper chest clean dry and intact. No complaints of surgical pain. Chest x-ray shows no pneumothorax.

## 2025-05-05 NOTE — H&P
Assessment & Plan  nonischemic congestive cardiomyopathy  -NYHA class II-III  -EF 15-20% (11/7/2024)   -started on GDMT (12/2024), GDMT is optimized at maximally tolerated doses   -Cath with normal coronaries (1/2025)  -Cardiac MRI (2/27/2025)-LVEF 23%, RVEF 25%  -continue GMDT  Plan for CRT-D implantation  essential hypertension  -BP remains elevated   - changes as noted above  -monitor BP closely at home   left bundle branch block  -known hx   -cath with normal coronaries  hyperlipidemia  -continue the statin therapy  tobacco use cessation education  -recently quit  -congratulated efforts  body mass index 30+ - obesity  -discussed healthy lifestyle and weight reduction with goal of BMI <27.5  -reviewed plant based diet  -healthy exercise with goal >30 min 5 days a week  -stress control, sleep hygeine  -control of traditional CV risk factors- dm, bp, xol  -consider weight loss specialist support  chronic long term disease management required  Patient presents with chronic and complex conditions requiring additional time and resources beyond the standard evaluation and management service. Comprehensive care was provided, where a personalized care plan to manage ongoing needs was created.  History of Present Illness  The patient is a 63 y/o female who presents today CRTD implantation. ?  Overall doing well with no major issues. No exertional symptoms. No recent chest pain or shortness of breath. Denies orthopnea or PND. No presyncope or syncope. No dizziness or palpitations. No nausea or vomiting. No LE edema. No claudication. External records reviewed, no recent hospitalizations. No recent medication changes. Right cardiac cath on 04/24/25 and labs performed on 04/18/25 and Echo on 04/28/25.  ?Echo 04/28/25:  1. LV Ejection Fraction is 15-20 %. Moderate left ventricular hypertrophy. There is severe global hypokinesis.   2. Right ventricular function is mildly decreased.   3. There is trace mitral

## 2025-05-05 NOTE — ANESTHESIA POSTPROCEDURE EVALUATION
Department of Anesthesiology  Postprocedure Note    Patient: Giovanna Zepeda  MRN: [id number]  YOB: 1960  Date of evaluation: 5/5/2025    Procedure Summary       Date: 05/05/25 Room / Location: [facility] EP LAB / [facility] ELECTROPHYSIOLOGY    Anesthesia Start: 1001 Anesthesia Stop: 1315    Procedures:       Insert ICD multi      Ultrasound guided vascular access Diagnosis:       Cardiomyopathy, unspecified type (HCC)      (Cardiomyopathy (HCC) [I42.9])    Providers: Dionte Medina MD Responsible Provider: Jaime Sandy MD    Anesthesia Type: MAC ASA Status: 4            Anesthesia Type: MAC    Carmine Phase I: Carmine Score: 8    Carmine Phase II:      Anesthesia Post Evaluation    Patient location during evaluation: PACU  Patient participation: complete - patient participated  Level of consciousness: sleepy but conscious  Pain score: 0  Airway patency: patent  Nausea & Vomiting: no nausea and no vomiting  Cardiovascular status: hemodynamically stable  Respiratory status: acceptable  Hydration status: stable  Multimodal analgesia pain management approach    There were no known notable events for this encounter.

## 2025-05-05 NOTE — PROGRESS NOTES
Family at bedside. ICD represenative called [phone], no one answered, message left for them to call SDS back.

## 2025-05-05 NOTE — PROGRESS NOTES
Discharge instructions printed and provided to patient and family with all questions answered in full. Left chest site clean, dry and intact. Ice pack and sling applied to left arm. PIV x1 removed with cath tip intact. Pt VSS and no signs of distress noted. Pt tolerating liquids and solids with no issues. Pt ambulating within room with no issues. Pt wheeled down to main entrance accompanied by staff. Pt condition stable at time of discharge.

## 2025-05-05 NOTE — DISCHARGE INSTRUCTIONS
Activity restrictions for the first 6 weeks:   - On the side your device was put in, do not raise your elbow above your shoulder (above your head or out to the side) or any other movements that cause you to stretch.  - Do not lift over 5 -10 pounds of weight on your surgical side.  - No swimming, over-head motions, or golfing.    - Wear a sling on the arm of device placement at night, or when you nap, for the first 1-2 weeks.    Please refer to \"Home Care After Device Placement\" given to you by the device clinic nurse for more detailed instructions.     Symptom management - What to expect:  - Soreness or tenderness at the site that may last for several days, up to typically 1-2 weeks.  - If you have pain at the site, you may take a mild pain reliever, such as acetaminophen (Tylenol). You may place an ice pack or warm pack over the site for 20 minutes every 2 hours.  - Bruising at the site that may take several days or weeks to completely go away.   - Please let us know if you have new or increasing pain at the device implant site.     Driving restrictions:   - No driving for 1-2 week(s) after your procedure.     Wound care:   - If a dressing was applied over your incision, leave the current bandage on for 3 days. Please keep the surgical area/site absolutely clean and dry.   - If DermaBond \"Clear Glue\" was applied to your incision, you may shower the day after your procedure.   - You may shower after: 3 days. When you shower, let the soap and water run down the incision. Do not scrub or rub the site.   - If you have steri strips (thin pieces of tape over the incision), these should be left in place until they fall off on their own or until they are removed by a nurse at your post-implant follow up visit.   - Do not use any lotions or ointments over the incision. As the site heals, you may feel itching (this is normal). Do not scratch or rub the site.   - Inspect the site daily for redness, swelling, drainage,

## 2025-05-06 LAB — ECHO BSA: 2 M2

## 2025-05-08 ENCOUNTER — OFFICE VISIT (OUTPATIENT)
Facility: CLINIC | Age: 65
End: 2025-05-08
Payer: OTHER GOVERNMENT

## 2025-05-08 ENCOUNTER — HOSPITAL ENCOUNTER (EMERGENCY)
Facility: HOSPITAL | Age: 65
Discharge: HOME | End: 2025-05-08
Attending: EMERGENCY MEDICINE
Payer: OTHER GOVERNMENT

## 2025-05-08 VITALS
OXYGEN SATURATION: 95 % | DIASTOLIC BLOOD PRESSURE: 89 MMHG | HEART RATE: 83 BPM | BODY MASS INDEX: 33.46 KG/M2 | RESPIRATION RATE: 18 BRPM | TEMPERATURE: 97.2 F | HEIGHT: 64 IN | WEIGHT: 196 LBS | SYSTOLIC BLOOD PRESSURE: 125 MMHG

## 2025-05-08 VITALS
WEIGHT: 199 LBS | DIASTOLIC BLOOD PRESSURE: 89 MMHG | RESPIRATION RATE: 20 BRPM | HEIGHT: 64 IN | HEART RATE: 84 BPM | TEMPERATURE: 97.7 F | SYSTOLIC BLOOD PRESSURE: 139 MMHG | BODY MASS INDEX: 33.97 KG/M2 | OXYGEN SATURATION: 97 %

## 2025-05-08 DIAGNOSIS — R73.03 PREDIABETES: ICD-10-CM

## 2025-05-08 DIAGNOSIS — M1A.9XX0 CHRONIC GOUT WITHOUT TOPHUS, UNSPECIFIED CAUSE, UNSPECIFIED SITE: ICD-10-CM

## 2025-05-08 DIAGNOSIS — E87.6 HYPOKALEMIA: ICD-10-CM

## 2025-05-08 DIAGNOSIS — Z87.891 FORMER SMOKER: ICD-10-CM

## 2025-05-08 DIAGNOSIS — I50.22 CHRONIC HEART FAILURE WITH REDUCED EJECTION FRACTION (HFREF, <= 40%) (HCC): ICD-10-CM

## 2025-05-08 DIAGNOSIS — E78.2 MIXED HYPERLIPIDEMIA: ICD-10-CM

## 2025-05-08 DIAGNOSIS — Z87.891 PERSONAL HISTORY OF TOBACCO USE: ICD-10-CM

## 2025-05-08 DIAGNOSIS — L25.9 CONTACT DERMATITIS, UNSPECIFIED CONTACT DERMATITIS TYPE, UNSPECIFIED TRIGGER: ICD-10-CM

## 2025-05-08 DIAGNOSIS — I10 PRIMARY HYPERTENSION: Primary | ICD-10-CM

## 2025-05-08 DIAGNOSIS — L23.1 ALLERGIC CONTACT DERMATITIS DUE TO ADHESIVES: Primary | ICD-10-CM

## 2025-05-08 DIAGNOSIS — Z12.31 SCREENING MAMMOGRAM, ENCOUNTER FOR: ICD-10-CM

## 2025-05-08 PROBLEM — I42.9 CARDIOMYOPATHY (HCC): Status: ACTIVE | Noted: 2025-04-09

## 2025-05-08 PROBLEM — I49.9 CARDIAC ARRHYTHMIA: Status: ACTIVE | Noted: 2025-04-09

## 2025-05-08 PROBLEM — I42.0 NONISCHEMIC CONGESTIVE CARDIOMYOPATHY (HCC): Status: ACTIVE | Noted: 2025-02-18

## 2025-05-08 LAB — HBA1C MFR BLD: 5.9 %

## 2025-05-08 PROCEDURE — 99283 EMERGENCY DEPT VISIT LOW MDM: CPT

## 2025-05-08 PROCEDURE — 99214 OFFICE O/P EST MOD 30 MIN: CPT | Performed by: NURSE PRACTITIONER

## 2025-05-08 PROCEDURE — 83036 HEMOGLOBIN GLYCOSYLATED A1C: CPT | Performed by: NURSE PRACTITIONER

## 2025-05-08 PROCEDURE — 6370000000 HC RX 637 (ALT 250 FOR IP): Performed by: EMERGENCY MEDICINE

## 2025-05-08 PROCEDURE — 3079F DIAST BP 80-89 MM HG: CPT | Performed by: NURSE PRACTITIONER

## 2025-05-08 PROCEDURE — G0296 VISIT TO DETERM LDCT ELIG: HCPCS | Performed by: NURSE PRACTITIONER

## 2025-05-08 PROCEDURE — 3075F SYST BP GE 130 - 139MM HG: CPT | Performed by: NURSE PRACTITIONER

## 2025-05-08 RX ORDER — OXYCODONE AND ACETAMINOPHEN 5; 325 MG/1; MG/1
TABLET ORAL
COMMUNITY
Start: 2025-05-05

## 2025-05-08 RX ORDER — BACITRACIN ZINC 500 [USP'U]/G
OINTMENT TOPICAL 2 TIMES DAILY
Status: DISCONTINUED | OUTPATIENT
Start: 2025-05-08 | End: 2025-05-08 | Stop reason: HOSPADM

## 2025-05-08 RX ORDER — BACITRACIN ZINC AND POLYMYXIN B SULFATE 500; 1000 [USP'U]/G; [USP'U]/G
OINTMENT TOPICAL
Qty: 15 G | Refills: 0 | Status: SHIPPED | OUTPATIENT
Start: 2025-05-08 | End: 2025-05-15

## 2025-05-08 RX ADMIN — BACITRACIN ZINC: 500 OINTMENT TOPICAL at 11:22

## 2025-05-08 ASSESSMENT — ENCOUNTER SYMPTOMS
APNEA: 0
TROUBLE SWALLOWING: 0
NAUSEA: 0
ABDOMINAL PAIN: 0
BACK PAIN: 0
EYE ITCHING: 0
SHORTNESS OF BREATH: 1
FACIAL SWELLING: 0
EYE PAIN: 0
WHEEZING: 1
DIARRHEA: 0
EYE DISCHARGE: 0
EYE REDNESS: 0
COLOR CHANGE: 0
VOMITING: 0
CHOKING: 0
ABDOMINAL DISTENTION: 0
BLOOD IN STOOL: 0
PHOTOPHOBIA: 0
CONSTIPATION: 0
SINUS PAIN: 0
STRIDOR: 0
SORE THROAT: 0

## 2025-05-08 ASSESSMENT — PAIN SCALES - GENERAL
PAINLEVEL_OUTOF10: 5
PAINLEVEL_OUTOF10: 6

## 2025-05-08 ASSESSMENT — PAIN - FUNCTIONAL ASSESSMENT: PAIN_FUNCTIONAL_ASSESSMENT: 0-10

## 2025-05-08 ASSESSMENT — PAIN DESCRIPTION - LOCATION: LOCATION: CHEST

## 2025-05-08 NOTE — PROGRESS NOTES
Chief Complaint   Patient presents with    Hypertension     Follow up     Have you been to the ER, urgent care clinic since your last visit?  Hospitalized since your last visit?   NO    Have you seen or consulted any other health care providers outside our system since your last visit?   NO

## 2025-05-08 NOTE — PROGRESS NOTES
Giovanna Zepeda is a 61 y.o. female who presents to the office today for the following:    Chief Complaint   Patient presents with    Hypertension        Past Medical History:   Diagnosis Date    Arthritis     CAD (coronary artery disease)     CHF (congestive heart failure) (HCC)     COPD (chronic obstructive pulmonary disease) (HCC)     Hypercholesterolemia     Hypertension 12/10/2020    Hypokalemia 12/10/2020    Menopause     Non-ischemic cardiomyopathy (HCC)     Obesity     Personal history of colonic polyps 11/17/2021    Uses LifeVest defibrillator 04/29/2025          Past Surgical History:   Procedure Laterality Date    CARDIAC CATHETERIZATION      CARDIAC PROCEDURE N/A 01/16/2025    Left heart cath / coronary angiography performed by Barney Sneed MD at [facility] CARDIAC CATH LAB    CARDIAC PROCEDURE N/A 4/24/2025    Right heart cath performed by Barney Sneed MD at [facility] CARDIAC CATH LAB    COLONOSCOPY N/A 12/3/2021    COLONOSCOPY (TIVA) performed by Betty Avalos MD at [facility] ENDOSCOPY    COLONOSCOPY N/A 03/08/2024    COLONOSCOPY BIOPSY performed by Betty Avalos Jr., MD at [facility] ENDOSCOPY    EP DEVICE PROCEDURE N/A 5/5/2025    Insert ICD multi performed by Dionte Medina MD at [facility] ELECTROPHYSIOLOGY    EP DEVICE PROCEDURE N/A 5/5/2025    Lv lead placement performed by Dionte Medina MD at [facility] ELECTROPHYSIOLOGY    HYSTERECTOMY (CERVIX STATUS UNKNOWN)      partial    INVASIVE VASCULAR N/A 01/16/2025    Ultrasound guided vascular access performed by Barney Sneed MD at [facility] CARDIAC CATH LAB    INVASIVE VASCULAR N/A 4/24/2025    Ultrasound guided vascular access performed by Barney Sneed MD at [facility] CARDIAC CATH LAB    INVASIVE VASCULAR N/A 5/5/2025    Ultrasound guided vascular access performed by Dionte Medina MD at [facility] ELECTROPHYSIOLOGY    OTHER SURGICAL HISTORY  01/2025    NON SURICAL LIFE VIST        Family History   Problem Relation Age of Onset    Hypertension Mother

## 2025-05-08 NOTE — ED TRIAGE NOTES
PT reports she had a defib placed to left chest Monday. States she went to [facility] due to concerns of site infections due to noticing a blister this am. Per pt [facility] informed her to go to ED due to not having a pressure dressing, pt reports no increase in site pain

## 2025-05-09 ENCOUNTER — RESULTS FOLLOW-UP (OUTPATIENT)
Facility: CLINIC | Age: 65
End: 2025-05-09

## 2025-05-09 ENCOUNTER — TELEPHONE (OUTPATIENT)
Age: 65
End: 2025-05-09

## 2025-05-13 ENCOUNTER — OFFICE VISIT (OUTPATIENT)
Age: 65
End: 2025-05-13
Payer: OTHER GOVERNMENT

## 2025-05-13 VITALS
WEIGHT: 194.6 LBS | HEIGHT: 64 IN | TEMPERATURE: 97.5 F | BODY MASS INDEX: 33.22 KG/M2 | DIASTOLIC BLOOD PRESSURE: 90 MMHG | OXYGEN SATURATION: 98 % | RESPIRATION RATE: 18 BRPM | HEART RATE: 83 BPM | SYSTOLIC BLOOD PRESSURE: 118 MMHG

## 2025-05-13 DIAGNOSIS — N89.8 VAGINAL DISCHARGE: ICD-10-CM

## 2025-05-13 DIAGNOSIS — I50.22 CHRONIC SYSTOLIC HEART FAILURE (HCC): Primary | ICD-10-CM

## 2025-05-13 PROCEDURE — 3080F DIAST BP >= 90 MM HG: CPT | Performed by: NURSE PRACTITIONER

## 2025-05-13 PROCEDURE — 99214 OFFICE O/P EST MOD 30 MIN: CPT | Performed by: NURSE PRACTITIONER

## 2025-05-13 PROCEDURE — 3074F SYST BP LT 130 MM HG: CPT | Performed by: NURSE PRACTITIONER

## 2025-05-13 RX ORDER — FUROSEMIDE 20 MG/1
10 TABLET ORAL DAILY
Qty: 60 TABLET | Refills: 1 | Status: SHIPPED | OUTPATIENT
Start: 2025-05-13

## 2025-05-13 ASSESSMENT — ENCOUNTER SYMPTOMS
SORE THROAT: 0
COUGH: 0
SHORTNESS OF BREATH: 1
BLOOD IN STOOL: 0
ABDOMINAL PAIN: 0
ABDOMINAL DISTENTION: 0
EYE PAIN: 0
CHEST TIGHTNESS: 0

## 2025-05-13 ASSESSMENT — PATIENT HEALTH QUESTIONNAIRE - PHQ9
SUM OF ALL RESPONSES TO PHQ QUESTIONS 1-9: 0
SUM OF ALL RESPONSES TO PHQ QUESTIONS 1-9: 0
2. FEELING DOWN, DEPRESSED OR HOPELESS: NOT AT ALL
SUM OF ALL RESPONSES TO PHQ QUESTIONS 1-9: 0
SUM OF ALL RESPONSES TO PHQ QUESTIONS 1-9: 0
1. LITTLE INTEREST OR PLEASURE IN DOING THINGS: NOT AT ALL

## 2025-05-13 NOTE — PATIENT INSTRUCTIONS
Medication changes:    -try butoconazole for your yeast infections symptoms.  If this is expensive when you try to pick it up, please instead choose an over the counter medication such as Monistat.    -DECREASE your furosemide to 10mg daily, which is a 1/2 tablet.  If this is too difficult to cut, you can take 1 full tablet every other day.          Please take this to your pharmacy to notify them of the change in medications.         Testing Ordered:    -no new testing today       Referrals:  -I will reach out to sleep medicine    Other Recommendations:      - Record blood pressure and heart rate daily before medication and two hours after medication  - Record weight daily upon waking/after using the bathroom.   - Keep a written records of your weights and blood pressure and bring to your next appointment.   - Call the clinic at if you have a weight gain of 3 or more pounds overnight OR 5 or more pounds in one week, new/worsened shortness of breath or swelling, or if your blood pressure begins to consistently run below 90/60 and/or you begin to experience dizziness or lightheadedness. Our office phone number [phone] option 2.  - Ensure you are drinking an adequate amount of water with a goal of 6-8 eight ounce glasses (1.5-2 liters) of fluid daily. Your urine should be clear and light yellow straw colored.       Follow up in 2 months with [facility]    Thank you for allowing us the privilege of being a part of your healthcare team! Please do not hesitate to contact our office at [phone] option 2 with any questions or concerns.     We are restarting a monthly heart failure support group, this will be the last Wednesday of every month from 5-6pm at [facility]. If you would like to attend you will need to RSVP to [email or web address]

## 2025-05-13 NOTE — PROGRESS NOTES
AD[facility]ANCED [facility] FAILURE CENTER  [facility] in [place], [place]  Heart Failure Outpatient Clinic Note    Patient name: Giovanna Zepeda  Patient : 1960  Patient MRN: [id number]  Date of service: 25    Primary care physician: Margie Angeles APRN - NP  Primary cardiologist:  Israel Land   Primary F cardiologist: Rigoberto Tafoya MD    CHIEF COMPLAINT:  Chief Complaint   Patient presents with    Congestive Heart Failure    Follow-up      ASSESSMENT:  Giovanna Zepeda is a 64 y.o. female with a history of  Chronic systolic heart failure/ NICM  Newly discovered dilated cardiomyopathy with EF of 15 to 20%  Morbid obesity  Chronic left bundle branch block  Hypertension well-controlled    INTERVAL HISTORY:  -diastolic mildly elevated  -labs :  K 3.7; creat 1.02;   -weight down 6.6lbs since last month  -s/p ICD 5/5   -RHC 25:  RA 1; PCW 5; PAP 26/16 (15); PA sat 62%; Lo CI 1.93   -Giovanna Zepeda is here for heart failure titration follow up.    History of Present Illness  Ms. Zepeda is feeling pretty good.      She underwent an ICD placement on 2025. She experienced a minor skin reaction to the dressing but was informed that the surgical outcome was satisfactory. She reports soreness at the surgical site.    She is not experiencing shortness of breath and was able to ascend several flights of stairs without difficulty. However, she reports dyspnea during household activities such as vacuuming, necessitating rest periods. She also reports difficulty lying flat due to breathing issues, a symptom that has persisted since the Fall. Occasionally, she experiences coughing at night but does not report any leg or abdominal swelling, chest pain, palpitations, or fluttering. She sleeps on 5-6 pillows due to orthopnea.      She reports occasional dizziness and lightheadedness, which she attributes to rapid changes in position. These symptoms occur approximately

## 2025-05-13 NOTE — ED PROVIDER NOTES
Transportation (Medical): No     Lack of Transportation (Non-Medical): No   Physical Activity: Not on file   Stress: Not on file   Social Connections: Not on file   Intimate Partner Violence: Not on file   Depression: Not at risk (5/13/2025)    PHQ-2     PHQ-2 Score: 0   Recent Concern: Depression - At risk (4/14/2025)    PHQ-2     PHQ-2 Score: 6   Housing Stability: Low Risk  (1/8/2025)    Housing Stability Vital Sign     Unable to Pay for Housing in the Last Year: No     Number of Times Moved in the Last Year: 0     Homeless in the Last Year: No   Interpersonal Safety: Not At Risk (5/5/2025)    Interpersonal Safety Domain Source: IP Abuse Screening     Physical abuse: Denies     Verbal abuse: Denies     Emotional abuse: Denies     Financial abuse: Denies     Sexual abuse: Denies   Utilities: Not At Risk (1/8/2025)    [facility] Utilities     Threatened with loss of utilities: No       PHYSICAL EXAM   Physical Exam  Vitals and nursing note reviewed.   Constitutional:       General: She is not in acute distress.     Appearance: Normal appearance. She is normal weight. She is not ill-appearing, toxic-appearing or diaphoretic.   HENT:      Head: Normocephalic and atraumatic.      Nose: Nose normal.      Mouth/Throat:      Mouth: Mucous membranes are moist.      Pharynx: No oropharyngeal exudate.   Eyes:      Extraocular Movements: Extraocular movements intact.      Conjunctiva/sclera: Conjunctivae normal.      Pupils: Pupils are equal, round, and reactive to light.   Cardiovascular:      Rate and Rhythm: Normal rate and regular rhythm.      Pulses: Normal pulses.      Heart sounds: Normal heart sounds.   Pulmonary:      Effort: Pulmonary effort is normal.      Breath sounds: Normal breath sounds.   Chest:       Abdominal:      General: Bowel sounds are normal.      Palpations: Abdomen is soft.      Tenderness: There is no abdominal tenderness.   Musculoskeletal:         General: Normal range of motion.      Cervical back:

## 2025-06-09 ENCOUNTER — HOSPITAL ENCOUNTER (OUTPATIENT)
Facility: HOSPITAL | Age: 65
Discharge: HOME | End: 2025-06-12
Payer: OTHER GOVERNMENT

## 2025-06-09 VITALS
BODY MASS INDEX: 33.12 KG/M2 | WEIGHT: 194 LBS | RESPIRATION RATE: 24 BRPM | TEMPERATURE: 97.3 F | HEIGHT: 64 IN | OXYGEN SATURATION: 99 % | SYSTOLIC BLOOD PRESSURE: 144 MMHG | HEART RATE: 64 BPM | DIASTOLIC BLOOD PRESSURE: 76 MMHG

## 2025-06-09 DIAGNOSIS — G47.33 OSA (OBSTRUCTIVE SLEEP APNEA): ICD-10-CM

## 2025-06-09 PROCEDURE — 95811 POLYSOM 6/>YRS CPAP 4/> PARM: CPT | Performed by: INTERNAL MEDICINE

## 2025-06-09 PROCEDURE — 95810 POLYSOM 6/> YRS 4/> PARAM: CPT | Performed by: INTERNAL MEDICINE

## 2025-06-12 ENCOUNTER — CLINICAL DOCUMENTATION (OUTPATIENT)
Age: 65
End: 2025-06-12

## 2025-06-12 DIAGNOSIS — G47.33 OSA (OBSTRUCTIVE SLEEP APNEA): Primary | ICD-10-CM

## 2025-06-12 NOTE — PROGRESS NOTES
Giovanna Zepeda is to be contacted by lead sleep technologist regarding results of Sleep Testing which was indicative of an average AHI of 55.4 per hour with an SpO2 melani of 86% and SpO2 of < 88% being 0.10 minutes.      Patient met criteria for a Split Night Test and a PAP Titration was preformed. Patient's sleep related respiratory disturbance was not controlled on CPAP therapy but was controlled on bilevel positive airway pressure therapy at a pressure of 16/10 cmH2O.    A Bi-level PAP prescription has been written and patient will be contacted by office staff regarding follow-up  in 2-3 months after initiation of therapy.    Encounter Diagnosis   Name Primary?    ANA ROSA (obstructive sleep apnea) Yes       Orders Placed This Encounter   Procedures    DME Order for (Specify) as OP     - DME device ordered - ResMed Device with Heated Humidifer  / .   - Diagnosis: Obstructive Sleep Apnea (G47.33)  - Length of Need: Lifetime      ResMed VPAP Auto (VAuto Mode): Maximum IPAP: 16 cmH2O; Minimum EPAP: 10 cmH2O;   Ramp Time: 30 Minutes.     CPAP mask -  As fitted during titration OR patient preference, headgear, tubing, and filter;  heated humidifier; wireless modem. Remote monitoring enrollment.    Gladis Mendez MD, FAA; NPI: [id number]  6/12/2025

## 2025-06-12 NOTE — PROGRESS NOTES
STAT PAP order faxed to [employer] company and patient notified via [employer]. Patient also scheduled for first adherence appointment.

## 2025-07-07 ENCOUNTER — TELEPHONE (OUTPATIENT)
Age: 65
End: 2025-07-07

## 2025-07-08 NOTE — PROGRESS NOTES
(Medical): No     Lack of Transportation (Non-Medical): No   Physical Activity: Not on file   Stress: Not on file   Social Connections: Not on file   Intimate Partner Violence: Not on file   Housing Stability: Low Risk  (1/8/2025)    Housing Stability Vital Sign     Unable to Pay for Housing in the Last Year: No     Number of Times Moved in the Last Year: 0     Homeless in the Last Year: No       LABORATORY RESULTS:  Lab Results   Component Value Date/Time     04/24/2025 11:20 AM    K 3.7 04/24/2025 11:20 AM     04/24/2025 11:20 AM    CO2 24 04/24/2025 11:20 AM    BUN 12 04/24/2025 11:20 AM    GFRAA 53 09/28/2022 06:25 PM    GLOB 3.6 04/24/2025 11:20 AM    ALT 16 04/24/2025 11:20 AM    AST 15 04/24/2025 11:20 AM       Lab Results   Component Value Date/Time    WBC 4.3 04/24/2025 11:20 AM    HGB 14.3 04/24/2025 11:20 AM    HCT 42.3 04/24/2025 11:20 AM     04/24/2025 11:20 AM    MCV 81.8 04/24/2025 11:20 AM       ALLERGY:  No Known Allergies    CURRENT MEDICATIONS:    Current Outpatient Medications:     carvedilol (COREG) 12.5 MG tablet, Take 1 tablet by mouth 2 times daily, Disp: 180 tablet, Rfl: 1    dapagliflozin (FARXIGA) 10 MG tablet, Take 1 tablet by mouth every morning, Disp: 90 tablet, Rfl: 1    sacubitril-valsartan (ENTRESTO)  MG per tablet, Take 1 tablet by mouth 2 times daily, Disp: 180 tablet, Rfl: 1    spironolactone (ALDACTONE) 25 MG tablet, Take 1 tablet by mouth daily, Disp: 90 tablet, Rfl: 1    furosemide (LASIX) 20 MG tablet, Take 0.5 tablets by mouth daily, Disp: 60 tablet, Rfl: 1    oxyCODONE-acetaminophen (PERCOCET) 5-325 MG per tablet, Take 1 tablet every 8 hours by oral route for 7 days., Disp: , Rfl:     buPROPion (WELLBUTRIN SR) 150 MG extended release tablet, Take 1 tablet by mouth twice daily, Disp: 180 tablet, Rfl: 1    hydrALAZINE (APRESOLINE) 25 MG tablet, Take 0.5 tablets by mouth every 6 hours as needed (SBP >160), Disp: 90 tablet, Rfl: 0    esomeprazole

## 2025-07-09 ENCOUNTER — OFFICE VISIT (OUTPATIENT)
Age: 65
End: 2025-07-09
Payer: OTHER GOVERNMENT

## 2025-07-09 VITALS
BODY MASS INDEX: 34.42 KG/M2 | WEIGHT: 201.6 LBS | RESPIRATION RATE: 18 BRPM | DIASTOLIC BLOOD PRESSURE: 82 MMHG | SYSTOLIC BLOOD PRESSURE: 116 MMHG | OXYGEN SATURATION: 94 % | TEMPERATURE: 98.3 F | HEART RATE: 83 BPM | HEIGHT: 64 IN

## 2025-07-09 DIAGNOSIS — I10 PRIMARY HYPERTENSION: ICD-10-CM

## 2025-07-09 DIAGNOSIS — I50.22 CHRONIC SYSTOLIC CONGESTIVE HEART FAILURE (HCC): Primary | ICD-10-CM

## 2025-07-09 DIAGNOSIS — E78.00 HYPERCHOLESTEROLEMIA: ICD-10-CM

## 2025-07-09 DIAGNOSIS — I42.8 NONISCHEMIC CARDIOMYOPATHY (HCC): ICD-10-CM

## 2025-07-09 DIAGNOSIS — Z95.810 BIVENTRICULAR ICD (IMPLANTABLE CARDIOVERTER-DEFIBRILLATOR) IN PLACE: ICD-10-CM

## 2025-07-09 DIAGNOSIS — G47.33 OSA (OBSTRUCTIVE SLEEP APNEA): ICD-10-CM

## 2025-07-09 PROCEDURE — 3079F DIAST BP 80-89 MM HG: CPT | Performed by: INTERNAL MEDICINE

## 2025-07-09 PROCEDURE — 3074F SYST BP LT 130 MM HG: CPT | Performed by: INTERNAL MEDICINE

## 2025-07-09 PROCEDURE — 99214 OFFICE O/P EST MOD 30 MIN: CPT | Performed by: INTERNAL MEDICINE

## 2025-07-09 RX ORDER — DAPAGLIFLOZIN 10 MG/1
10 TABLET, FILM COATED ORAL EVERY MORNING
Qty: 90 TABLET | Refills: 1 | Status: SHIPPED | OUTPATIENT
Start: 2025-07-09

## 2025-07-09 RX ORDER — CARVEDILOL 12.5 MG/1
12.5 TABLET ORAL 2 TIMES DAILY
Qty: 180 TABLET | Refills: 1 | Status: SHIPPED | OUTPATIENT
Start: 2025-07-09

## 2025-07-09 RX ORDER — SPIRONOLACTONE 25 MG/1
25 TABLET ORAL DAILY
Qty: 90 TABLET | Refills: 1 | Status: SHIPPED | OUTPATIENT
Start: 2025-07-09

## 2025-07-09 ASSESSMENT — PATIENT HEALTH QUESTIONNAIRE - PHQ9
2. FEELING DOWN, DEPRESSED OR HOPELESS: SEVERAL DAYS
1. LITTLE INTEREST OR PLEASURE IN DOING THINGS: NEARLY EVERY DAY
5. POOR APPETITE OR OVEREATING: NEARLY EVERY DAY
SUM OF ALL RESPONSES TO PHQ QUESTIONS 1-9: 12
9. THOUGHTS THAT YOU WOULD BE BETTER OFF DEAD, OR OF HURTING YOURSELF: NOT AT ALL
SUM OF ALL RESPONSES TO PHQ QUESTIONS 1-9: 12
6. FEELING BAD ABOUT YOURSELF - OR THAT YOU ARE A FAILURE OR HAVE LET YOURSELF OR YOUR FAMILY DOWN: SEVERAL DAYS
3. TROUBLE FALLING OR STAYING ASLEEP: MORE THAN HALF THE DAYS
4. FEELING TIRED OR HAVING LITTLE ENERGY: MORE THAN HALF THE DAYS
SUM OF ALL RESPONSES TO PHQ QUESTIONS 1-9: 12
SUM OF ALL RESPONSES TO PHQ QUESTIONS 1-9: 12
10. IF YOU CHECKED OFF ANY PROBLEMS, HOW DIFFICULT HAVE THESE PROBLEMS MADE IT FOR YOU TO DO YOUR WORK, TAKE CARE OF THINGS AT HOME, OR GET ALONG WITH OTHER PEOPLE: VERY DIFFICULT
7. TROUBLE CONCENTRATING ON THINGS, SUCH AS READING THE NEWSPAPER OR WATCHING TELEVISION: NOT AT ALL
8. MOVING OR SPEAKING SO SLOWLY THAT OTHER PEOPLE COULD HAVE NOTICED. OR THE OPPOSITE, BEING SO FIGETY OR RESTLESS THAT YOU HAVE BEEN MOVING AROUND A LOT MORE THAN USUAL: NOT AT ALL

## 2025-07-09 NOTE — PATIENT INSTRUCTIONS
Medication changes:    Increase coreg 12.5mg twice a day    Testing Ordered:    Labs today    Echo in 1-2 months to be scheduled with [facility] in [place], we will fax the order, then please call to schedule  Please call Dr Medina (device doctor) to make appt regarding diaphragmatic stimulation    Other Recommendations:      - Record blood pressure and heart rate daily before medication and two hours after medication  - Record weight daily upon waking/after using the bathroom.   - Keep a written records of your weights and blood pressure and bring to your next appointment.   - Call the clinic at if you have a weight gain of 3 or more pounds overnight OR 5 or more pounds in one week, new/worsened shortness of breath or swelling, or if your blood pressure begins to consistently run below 90/60 and/or you begin to experience dizziness or lightheadedness. Our office phone number [phone] option 2.  - Ensure you are drinking an adequate amount of water with a goal of 6-8 eight ounce glasses (1.5-2 liters) of fluid daily. Your urine should be clear and light yellow straw colored.       Follow up in 2 months with [facility]    Our monthly Heart Failure Support Group is held on the last Wednesday of every month from 5-6pm at [facility]. If you would like to attend, please RSVP to [email or web address]    Thank you for allowing us the privilege of being a part of your healthcare team! Please do not hesitate to contact our office at [phone] option 2 with any questions or concerns.

## 2025-07-10 ENCOUNTER — RESULTS FOLLOW-UP (OUTPATIENT)
Age: 65
End: 2025-07-10

## 2025-07-10 LAB
ANION GAP SERPL CALC-SCNC: 5 MMOL/L (ref 2–12)
BUN SERPL-MCNC: 14 MG/DL (ref 6–20)
BUN/CREAT SERPL: 11 (ref 12–20)
CALCIUM SERPL-MCNC: 9.6 MG/DL (ref 8.5–10.1)
CHLORIDE SERPL-SCNC: 112 MMOL/L (ref 97–108)
CHOLEST SERPL-MCNC: 138 MG/DL
CO2 SERPL-SCNC: 24 MMOL/L (ref 21–32)
CREAT SERPL-MCNC: 1.32 MG/DL (ref 0.55–1.02)
GLUCOSE SERPL-MCNC: 128 MG/DL (ref 65–100)
HDLC SERPL-MCNC: 50 MG/DL
HDLC SERPL: 2.8 (ref 0–5)
LDLC SERPL CALC-MCNC: 67.8 MG/DL (ref 0–100)
NT PRO BNP: 246 PG/ML
POTASSIUM SERPL-SCNC: 4 MMOL/L (ref 3.5–5.1)
SODIUM SERPL-SCNC: 141 MMOL/L (ref 136–145)
TRIGL SERPL-MCNC: 101 MG/DL
VLDLC SERPL CALC-MCNC: 20.2 MG/DL

## 2025-07-10 NOTE — TELEPHONE ENCOUNTER
----- Message from Dr. Charlie Davis MD sent at 7/10/2025  8:10 AM EDT -----  Please let patient know labs suggest maybe some dehydration, proBNP down to 246 from 691 and creatinine has increased. She should reduce Lasix to just PRN for weight gain>3lbs/day or 5lbs/week      Called patient with no answer. Left  with [facility] call back number. Will await return call.

## 2025-07-11 RX ORDER — FUROSEMIDE 20 MG/1
10 TABLET ORAL DAILY PRN
Qty: 30 TABLET | Refills: 1 | Status: SHIPPED | OUTPATIENT
Start: 2025-07-11

## 2025-07-11 NOTE — TELEPHONE ENCOUNTER
7/11- 913am-Called patient regarding below results, Patient verbalized understanding. Patient has no further questions.     Please let patient know labs suggest maybe some dehydration, proBNP down to 246 from 691 and creatinine has increased. She should reduce Lasix to just PRN for weight gain>3lbs/day or 5lbs/week

## 2025-07-12 LAB — 25(OH)D3 SERPL-MCNC: <9 NG/ML (ref 30–100)

## 2025-09-05 ENCOUNTER — TELEPHONE (OUTPATIENT)
Age: 65
End: 2025-09-05

## (undated) DEVICE — COVER PRB INTOP 96X6 IN LF

## (undated) DEVICE — LIQUIBAND RAPID ADHESIVE 36/CS 0.8ML: Brand: MEDLINE

## (undated) DEVICE — INTRODUCER SPLIT SHEATH PRELUDE SNAP 9FR X 13CM

## (undated) DEVICE — DRAPE EQUIP C ARM 74X42 IN MOB XR W/ TIE RUBBER BND LF

## (undated) DEVICE — MICROPUNCTURE INTRODUCER SET SILHOUETTE TRANSITIONLESS PUSH-PLUS DESIGN - STIFFENED CANNULA WITH STAINLESS STEEL WIRE GUIDE: Brand: MICROPUNCTURE

## (undated) DEVICE — GUIDEWIRE VASC L260CM DIA0.035IN RAD 3MM J TIP L7CM PTFE

## (undated) DEVICE — SYRINGE MED 10ML RED POLYCARB BRL FIX M LUER CONN FLAT GRP

## (undated) DEVICE — 1200CC GUARDIAN II: Brand: GUARDIAN

## (undated) DEVICE — 3M™ TEGADERM™ I.V. ADVANCED SECUREMENT DRESSING, 1685, 3-1/2 IN X 4-1/2 IN (8.5 CM X 11.5 CM), 50/CT 4CT/CASE: Brand: 3M™ TEGADERM™

## (undated) DEVICE — TOWEL,OR,DSP,ST,BLUE,DLX,6/PK,12PK/CS: Brand: MEDLINE

## (undated) DEVICE — SPONGE GZ W4XL4IN COT 12 PLY TYP VII WVN C FLD DSGN STERILE

## (undated) DEVICE — CATHETER DIAG 6FR L100CM COR NYL JUDKINS R 5 RADPQ W/O SIDE

## (undated) DEVICE — CATHETER GUID 7FR L43CM ID1.8MM RIGHTSITE

## (undated) DEVICE — CARDINAL HEALTH LAP SPONGE  4 X 18 IN. UNWASHED X-RAY DETECTABLE SOFTPACK: Brand: CARDINAL HEALTH

## (undated) DEVICE — CATHETER COR DIAG JUDKINS L 3.5 CRV 6FR 100CM 0 SIDE H

## (undated) DEVICE — GLOVE ORANGE PI 7 1/2   MSG9075

## (undated) DEVICE — BOWL MED M 16OZ PLAS CAP GRAD

## (undated) DEVICE — CABLE PACE LNG L12IN CHN A OR V SM CLP EPG TEMP DISP

## (undated) DEVICE — WASTEBAG DRIP/ADAPTER: Brand: MEDLINE INDUSTRIES, INC.

## (undated) DEVICE — CATHETER COR DIAG AMPLATZ L 1.0 CRV 6FR 100CM 0 SIDE H

## (undated) DEVICE — FCPS RAD JAW 4LC 240CM W/NDL -- BX/20 RADIAL JAW 4

## (undated) DEVICE — TRAY SURG CUST CRD CATH 3 PRT SSR

## (undated) DEVICE — CATHETER PRESSURE WEDGE BLLN 6FRX110CM

## (undated) DEVICE — LINE SAMPLING ADVANCE ORAL NASAL MICROSTREAM O2 TUBING 6.5'

## (undated) DEVICE — TUBING, SUCTION, 9/32" X 10', STRAIGHT: Brand: MEDLINE

## (undated) DEVICE — CONTAINER,SPECIMEN,PNEU TUBE,4OZ,OR STRL: Brand: MEDLINE

## (undated) DEVICE — CATHETER GUID OD7FR ID5.4FR L40CM C315

## (undated) DEVICE — MEDI-TRACE CADENCE ADULT, DEFIBRILLATION ELECTRODE -RTS  (10 PR/PK) - PHYSIO-CONTROL: Brand: MEDI-TRACE CADENCE

## (undated) DEVICE — BAG ICE W6.5XL14IN REFILLABLE RECTANG 4 TIE ATTCH W/ CLMP

## (undated) DEVICE — PINNACLE INTRODUCER SHEATH: Brand: PINNACLE

## (undated) DEVICE — SUTURE PERMA-HAND SZ 0 L30IN NONABSORBABLE BLK L36MM CT-1 424H

## (undated) DEVICE — DRAPE,APERTURE,MINOR PROCEDURE: Brand: MEDLINE

## (undated) DEVICE — GUIDEWIRE VASC L80CM OD0.018IN TIP L2CM NIT COR TUNGSTEN

## (undated) DEVICE — KIT MINI ACCS STIFF DIL W 5FR 10CM COAX INTRO SHTH 40CM

## (undated) DEVICE — SC 3W MP RA OFF PB - PG: Brand: NAMIC

## (undated) DEVICE — 3M™ TEGADERM™ TRANSPARENT FILM DRESSING FRAME STYLE, 1626W, 4 IN X 4-3/4 IN (10 CM X 12 CM), 50/CT 4CT/CASE: Brand: 3M™ TEGADERM™

## (undated) DEVICE — JELLY,LUBE,STERILE,FLIP TOP,TUBE,4-OZ: Brand: MEDLINE

## (undated) DEVICE — FORCEPS BX L240CM JAW DIA2.4MM ORNG L CAP W/ NDL DISP RAD

## (undated) DEVICE — PAD,PREPPING,CUFFED,24X48,7",NONSTERILE: Brand: MEDLINE

## (undated) DEVICE — SOLUTION IRRIG 1000ML STRL H2O USP PLAS POUR BTL

## (undated) DEVICE — 48" PROBE COVER W/GEL, ULTRASOUND, STERILE: Brand: SITE-RITE

## (undated) DEVICE — SUTURE VICRYL + SZ 3-0 L27IN ABSRB UD FS2 3/8 CIR REV CUT

## (undated) DEVICE — 3M™ STERI-DRAPE™ SMALL DRAPE WITH ADHESIVE APERTURE 1092 25/BX,4/CS&#X20;: Brand: STERI-DRAPE™

## (undated) DEVICE — SYRINGE ANGIO 10 CC POLYCARB DK GRN MEDALLION DISP

## (undated) DEVICE — DEVICE COMPR LNG 27 CM VASC BND

## (undated) DEVICE — SUTURE VICRYL + SZ 2-0 L36IN ABSRB UD L36MM CT-1 1/2 CIR VCP945H

## (undated) DEVICE — GLIDESHEATH SLENDER STAINLESS STEEL KIT: Brand: GLIDESHEATH SLENDER

## (undated) DEVICE — PAD, DEFIB, ADULT, RADIOLUCENT, PHYSIO: Brand: MEDLINE

## (undated) DEVICE — SUTURE ABSORBABLE ANTIBACT 4-0 SH-1 9 IN VIO PDS + SXPP1B454

## (undated) DEVICE — INTRODUCER SHTH L13CM OD7FR SH ORNG HUB SEAMLESS SAFSHTH

## (undated) DEVICE — SUTURE PERMAHAND SZ 0 L30IN NONABSORBABLE BLK L26MM SH 1/2 K834H

## (undated) DEVICE — SYRINGE MED 10ML PUR GAM COMPATIBLE POLYCARB FIX M LUER CONN

## (undated) DEVICE — SPONGE GZ W4XL4IN COT 12 PLY TYP VII WVN C FLD DSGN

## (undated) DEVICE — WASH CLOTH INCONT LO LINT PREM 12X13 IN LF DISP

## (undated) DEVICE — COVER LT HNDL UNIV FOR LNG HNDL KOVER 1 PER PK

## (undated) DEVICE — 3M™ IOBAN™ 2 ANTIMICROBIAL INCISE DRAPE 6650EZ: Brand: IOBAN™ 2

## (undated) DEVICE — SLING ARM AD UNIV